# Patient Record
Sex: FEMALE | Race: BLACK OR AFRICAN AMERICAN | NOT HISPANIC OR LATINO | URBAN - METROPOLITAN AREA
[De-identification: names, ages, dates, MRNs, and addresses within clinical notes are randomized per-mention and may not be internally consistent; named-entity substitution may affect disease eponyms.]

---

## 2017-01-01 ENCOUNTER — EMERGENCY (EMERGENCY)
Facility: HOSPITAL | Age: 57
LOS: 1 days | Discharge: HOME | End: 2017-01-01
Attending: EMERGENCY MEDICINE | Admitting: EMERGENCY MEDICINE
Payer: SELF-PAY

## 2017-01-01 VITALS
HEART RATE: 92 BPM | TEMPERATURE: 98 F | DIASTOLIC BLOOD PRESSURE: 82 MMHG | OXYGEN SATURATION: 100 % | SYSTOLIC BLOOD PRESSURE: 144 MMHG | RESPIRATION RATE: 18 BRPM

## 2017-01-01 DIAGNOSIS — M25.561 PAIN IN RIGHT KNEE: ICD-10-CM

## 2017-01-01 PROCEDURE — 99283 EMERGENCY DEPT VISIT LOW MDM: CPT | Mod: 25

## 2017-01-01 PROCEDURE — 99283 EMERGENCY DEPT VISIT LOW MDM: CPT

## 2017-01-01 PROCEDURE — 99053 MED SERV 10PM-8AM 24 HR FAC: CPT

## 2017-01-01 RX ORDER — IBUPROFEN 200 MG
600 TABLET ORAL ONCE
Qty: 0 | Refills: 0 | Status: COMPLETED | OUTPATIENT
Start: 2017-01-01 | End: 2017-01-01

## 2017-01-01 RX ADMIN — Medication 600 MILLIGRAM(S): at 08:04

## 2017-01-01 NOTE — ED PROVIDER NOTE - PHYSICAL EXAMINATION
Gen: Well appearing, NAD, AOx3  Head: NCAT  HEENT: MMM, normal conjunctiva  Abd: soft, NTND, no rebound or guarding  MSK: No CVA tenderness. No edema, no visible deformities. Right knee: minimal swelling. Normal ROM. Nontender. Neg ant/post draw. Unable to reproduce symptoms with exam. No calf tenderness.   Neuro: No focal neurologic deficits. C  Skin: Warm and dry, no evidence of rash  Psych: normal mood and affect

## 2017-01-01 NOTE — ED PROCEDURE NOTE - CPROC ED POST PROC CARE GUIDE1
Verbal/written post procedure instructions were given to patient/caregiver./Keep the cast/splint/dressing clean and dry./Instructed patient/caregiver to follow-up with primary care physician.

## 2017-01-01 NOTE — ED PROVIDER NOTE - ATTENDING CONTRIBUTION TO CARE
Attending MD Cook: I personally have seen and examined this patient.  Resident note reviewed and agree on plan of care and except where noted.  See below for details.     56F with no reported PMH presents to the ED with >1 year of R knee pain.  Reports no marked change today but since over a year decided to come in.  Reports that has "clicking" and pain with walking up stairs.  Denies trauma, fall, sudden change in appearance of knee, swelling.  Reports no OTC for pain.  Denies chest pain, shortness of breath, calf pain, abdominal pain, n/v/d, recent travel, rash, urinary complaints.  On exam, NCAT, well appearing, no acute distress, AAOx3, no tenderness to palpation to knee at any aspect, FROM at bilateral knees, ambulating with steady gait, able to go from standing to sitting easily with no assistance needed to stand, no warmth, erythema, swelling to R knee, +2 DPs bilaterally, 5/5 strength bilateral lower extremities; Plan: Motrin, discharge with instructions to follow up with Orthopedist and to set up primary care as reports no PMD. Follow up appointment instructions given, importance of follow up emphasized, return to ED parameters reviewed and patient verbalized understanding.  All questions answered, all concerns addressed.

## 2017-01-01 NOTE — ED PROVIDER NOTE - MEDICAL DECISION MAKING DETAILS
56 y.o. female pw right knee discomfort since 2015. Well appearing. Normal exam. Will wrap knee, analgesia, ortho follow up.

## 2017-01-01 NOTE — ED ADULT NURSE NOTE - CHPI ED SYMPTOMS NEG
no abrasion/no numbness/no bruising/no weakness/no tingling/no tenderness/no deformity/no difficulty bearing weight/no back pain

## 2017-01-01 NOTE — ED PROVIDER NOTE - OBJECTIVE STATEMENT
56 y.o. female previously healthy pw right knee "clicking" and pain when walking up stairs since 2015. Came to ED today to get it checked out. Has not seen an orthopedist for this. Did not take anything for pain today. Denies trauma or swelling.     ROS: denies HA, weakness, dizziness, fevers/chills, nausea/vomiting, chest pain, SOB, diaphoresis, abdominal pain, back/neck pain, dysuria/hematuria, or rash

## 2017-01-01 NOTE — ED PROVIDER NOTE - MUSCULOSKELETAL MINIMAL EXAM
normal range of motion/no tenderness to palpation to knee at any aspect, FROM at R knee, ambulating with steady gait, no warmth, erythema, swelling to R knee, +2 DPs bilaterally, 5/5 strength bilateral lower extremities

## 2017-01-01 NOTE — ED ADULT NURSE NOTE - OBJECTIVE STATEMENT
57 yo female presents to ED c/o right knee pain when walking up and down stairs, pain has been consistent since 2015. 55 yo female presents to ED c/o right knee pain when walking up and down stairs, pain has been consistent since 2015, no acute change in pain. Patient denies trauma/injury to knee. Mild edema noted to right knee. No difficulty bearing weight, weakness, tenderness, decreased ROM, or paresthesias. Patient was concerned because she works at [employer] and often uses step stools.

## 2017-09-05 ENCOUNTER — EMERGENCY (EMERGENCY)
Facility: HOSPITAL | Age: 57
LOS: 1 days | Discharge: HOME | End: 2017-09-05
Attending: EMERGENCY MEDICINE | Admitting: EMERGENCY MEDICINE
Payer: SELF-PAY

## 2017-09-05 VITALS
HEART RATE: 78 BPM | OXYGEN SATURATION: 98 % | DIASTOLIC BLOOD PRESSURE: 83 MMHG | RESPIRATION RATE: 18 BRPM | SYSTOLIC BLOOD PRESSURE: 143 MMHG | TEMPERATURE: 98 F

## 2017-09-05 PROCEDURE — 99283 EMERGENCY DEPT VISIT LOW MDM: CPT

## 2017-09-05 RX ORDER — DIPHENHYDRAMINE HCL 50 MG
25 CAPSULE ORAL EVERY 4 HOURS
Qty: 0 | Refills: 0 | Status: DISCONTINUED | OUTPATIENT
Start: 2017-09-05 | End: 2017-09-09

## 2017-09-05 RX ORDER — FAMOTIDINE 10 MG/ML
20 INJECTION INTRAVENOUS ONCE
Qty: 0 | Refills: 0 | Status: COMPLETED | OUTPATIENT
Start: 2017-09-05 | End: 2017-09-05

## 2017-09-05 RX ADMIN — Medication 25 MILLIGRAM(S): at 16:54

## 2017-09-05 RX ADMIN — Medication 50 MILLIGRAM(S): at 16:54

## 2017-09-05 RX ADMIN — FAMOTIDINE 20 MILLIGRAM(S): 10 INJECTION INTRAVENOUS at 16:54

## 2017-09-05 NOTE — ED PROVIDER NOTE - PROGRESS NOTE DETAILS
patient feeling improved - Alaina Randle PA-C pt signed out to me for observation due to allergic reaction with improvement of sts and dc home.   shayan rodgers

## 2017-09-05 NOTE — ED PROVIDER NOTE - EYES, MLM
Clear bilaterally, pupils equal, round and reactive to light. mild bilateral conjunctival injection without tearing. + bilateral eyelid and infraorbital swelling

## 2017-09-05 NOTE — ED PROVIDER NOTE - ATTENDING CONTRIBUTION TO CARE
Attending MD Cook:   I personally have seen and examined this patient.  Physician assistant note reviewed and agree on plan of care and except where noted.  See below for details.     57F with no reported PMH/PSH/Meds/Allergies presents to the ED with bilateral swelling periorbitally for two days.  Reports something similar happened in 1993 and was told it was allergies.  Reports was told to follow up with allergist at that time.  Denies new environmental exposure including soap, makeup, detergents, food, new places, meds. Denies chest pain, shortness of breath, palpitations. Denies fevers, chills, dizziness, changes in vision.  Denies any other physical complaints including chest pain, shortness of breath, palpitations, abdominal pain, nausea, vomiting, diarrhea, urinary complaints.  On exam, NAD, head NCAT, PERRL, +mild periorbital edema, no erythema, EOMI, no reported pain with EOM, +mild (1+) conjunctival injection, FROM at neck, no tenderness to palpation or stepoffs along length of spine, lungs CTAB with good inspiratory effort, no stridor, no tongue swelling, no oropharyngeal erythema, no tonsillar edema,+S1S2, no m/r/g, abdomen soft with +BS, NT, ND, no CVAT, moving all extremities with 5/5 strength bilateral upper and lower extremities, good and equal  strength bilaterally, sensory grossly intact; A/P: 57F with periorbital swelling, suspect allergic reaction, will give Benadryl, Prednisone and Famotidine, reassess

## 2017-09-05 NOTE — ED PROVIDER NOTE - OBJECTIVE STATEMENT
patient with eyelid swelling bilaterally x 2 days with conjunctival injection. no new meds, foods, soaps detergents. no chest pain or sob. no rash. no fever/chills. wanted to "make sure it wasn't an infection"

## 2017-09-05 NOTE — ED PROVIDER NOTE - PLAN OF CARE
1. Stay hydrated.  Avoid allergen/potential triggers.  2. Take Benadryl 25mg every 6hrs for allergic reaction as needed- can make drowsy, don't drive after. Take Pepcid 20mg 2x/day for allergic reaction as needed. Take Prednisone 40mg daily for 4 days.  3. Follow up with your PCP or Medicine clinic [phone] in 1-2 days.  4. Return if symptoms worsen, fever, weakness, dizziness, difficulty breathing, tongue or throat swelling/discomfort and all other concerns.

## 2017-09-05 NOTE — ED PROVIDER NOTE - CARE PLAN
Principal Discharge DX:	Allergic reaction  Instructions for follow-up, activity and diet:	1. Stay hydrated.  Avoid allergen/potential triggers.  2. Take Benadryl 25mg every 6hrs for allergic reaction as needed- can make drowsy, don't drive after. Take Pepcid 20mg 2x/day for allergic reaction as needed. Take Prednisone 40mg daily for 4 days.  3. Follow up with your PCP or Medicine clinic [phone] in 1-2 days.  4. Return if symptoms worsen, fever, weakness, dizziness, difficulty breathing, tongue or throat swelling/discomfort and all other concerns. Principal Discharge DX:	Allergic reaction  Instructions for follow-up, activity and diet:	1. Stay hydrated.  Avoid allergen/potential triggers.  2. Take Benadryl 25mg every 6hrs for allergic reaction as needed- can make drowsy, don't drive after. Take Pepcid 20mg 2x/day for allergic reaction as needed. Take Prednisone 40mg daily for 4 days.  3. Follow up with your PCP or Medicine clinic [phone] in 1-2 days.  4. Return if symptoms worsen, fever, weakness, dizziness, difficulty breathing, tongue or throat swelling/discomfort and all other concerns. Principal Discharge DX:	Allergic reaction  Instructions for follow-up, activity and diet:	1. Stay hydrated.  Avoid allergen/potential triggers.  2. Take Benadryl 25mg every 6hrs for allergic reaction as needed- can make drowsy, don't drive after. Take Pepcid 20mg 2x/day for allergic reaction as needed. Take Prednisone 40mg daily for 4 days.  3. Follow up with your PCP or Medicine clinic [phone] in 1-2 days.  4. Return if symptoms worsen, fever, weakness, dizziness, difficulty breathing, tongue or throat swelling/discomfort and all other concerns.

## 2018-01-22 ENCOUNTER — EMERGENCY (EMERGENCY)
Facility: HOSPITAL | Age: 58
LOS: 1 days | End: 2018-01-22
Attending: EMERGENCY MEDICINE | Admitting: EMERGENCY MEDICINE
Payer: SELF-PAY

## 2018-01-22 VITALS
RESPIRATION RATE: 16 BRPM | WEIGHT: 199.96 LBS | HEIGHT: 66 IN | OXYGEN SATURATION: 99 % | DIASTOLIC BLOOD PRESSURE: 88 MMHG | SYSTOLIC BLOOD PRESSURE: 144 MMHG | HEART RATE: 72 BPM

## 2018-01-22 LAB
ALBUMIN SERPL ELPH-MCNC: 4 G/DL — SIGNIFICANT CHANGE UP (ref 3.3–5)
ALP SERPL-CCNC: 89 U/L — SIGNIFICANT CHANGE UP (ref 40–120)
ALT FLD-CCNC: 11 U/L RC — SIGNIFICANT CHANGE UP (ref 10–45)
ANION GAP SERPL CALC-SCNC: 9 MMOL/L — SIGNIFICANT CHANGE UP (ref 5–17)
AST SERPL-CCNC: 16 U/L — SIGNIFICANT CHANGE UP (ref 10–40)
BILIRUB SERPL-MCNC: 0.2 MG/DL — SIGNIFICANT CHANGE UP (ref 0.2–1.2)
BUN SERPL-MCNC: 13 MG/DL — SIGNIFICANT CHANGE UP (ref 7–23)
CALCIUM SERPL-MCNC: 9.5 MG/DL — SIGNIFICANT CHANGE UP (ref 8.4–10.5)
CHLORIDE SERPL-SCNC: 104 MMOL/L — SIGNIFICANT CHANGE UP (ref 96–108)
CO2 SERPL-SCNC: 30 MMOL/L — SIGNIFICANT CHANGE UP (ref 22–31)
CREAT SERPL-MCNC: 0.69 MG/DL — SIGNIFICANT CHANGE UP (ref 0.5–1.3)
GLUCOSE SERPL-MCNC: 93 MG/DL — SIGNIFICANT CHANGE UP (ref 70–99)
HCT VFR BLD CALC: 35.2 % — SIGNIFICANT CHANGE UP (ref 34.5–45)
HGB BLD-MCNC: 12 G/DL — SIGNIFICANT CHANGE UP (ref 11.5–15.5)
MCHC RBC-ENTMCNC: 28.8 PG — SIGNIFICANT CHANGE UP (ref 27–34)
MCHC RBC-ENTMCNC: 34.2 GM/DL — SIGNIFICANT CHANGE UP (ref 32–36)
MCV RBC AUTO: 84.1 FL — SIGNIFICANT CHANGE UP (ref 80–100)
PLATELET # BLD AUTO: 229 K/UL — SIGNIFICANT CHANGE UP (ref 150–400)
POTASSIUM SERPL-MCNC: 4.6 MMOL/L — SIGNIFICANT CHANGE UP (ref 3.5–5.3)
POTASSIUM SERPL-SCNC: 4.6 MMOL/L — SIGNIFICANT CHANGE UP (ref 3.5–5.3)
PROT SERPL-MCNC: 7.9 G/DL — SIGNIFICANT CHANGE UP (ref 6–8.3)
RBC # BLD: 4.18 M/UL — SIGNIFICANT CHANGE UP (ref 3.8–5.2)
RBC # FLD: 12.2 % — SIGNIFICANT CHANGE UP (ref 10.3–14.5)
SODIUM SERPL-SCNC: 143 MMOL/L — SIGNIFICANT CHANGE UP (ref 135–145)
WBC # BLD: 5.4 K/UL — SIGNIFICANT CHANGE UP (ref 3.8–10.5)
WBC # FLD AUTO: 5.4 K/UL — SIGNIFICANT CHANGE UP (ref 3.8–10.5)

## 2018-01-22 PROCEDURE — 85027 COMPLETE CBC AUTOMATED: CPT

## 2018-01-22 PROCEDURE — 93971 EXTREMITY STUDY: CPT

## 2018-01-22 PROCEDURE — 73562 X-RAY EXAM OF KNEE 3: CPT | Mod: 26,LT

## 2018-01-22 PROCEDURE — 93971 EXTREMITY STUDY: CPT | Mod: 26

## 2018-01-22 PROCEDURE — 96374 THER/PROPH/DIAG INJ IV PUSH: CPT

## 2018-01-22 PROCEDURE — 73562 X-RAY EXAM OF KNEE 3: CPT

## 2018-01-22 PROCEDURE — 99284 EMERGENCY DEPT VISIT MOD MDM: CPT

## 2018-01-22 PROCEDURE — 80053 COMPREHEN METABOLIC PANEL: CPT

## 2018-01-22 PROCEDURE — 99284 EMERGENCY DEPT VISIT MOD MDM: CPT | Mod: 25

## 2018-01-22 RX ORDER — ACETAMINOPHEN 500 MG
1000 TABLET ORAL ONCE
Qty: 0 | Refills: 0 | Status: COMPLETED | OUTPATIENT
Start: 2018-01-22 | End: 2018-01-22

## 2018-01-22 RX ORDER — SODIUM CHLORIDE 9 MG/ML
1000 INJECTION INTRAMUSCULAR; INTRAVENOUS; SUBCUTANEOUS
Qty: 0 | Refills: 0 | Status: DISCONTINUED | OUTPATIENT
Start: 2018-01-22 | End: 2018-01-26

## 2018-01-22 RX ADMIN — Medication 1000 MILLIGRAM(S): at 10:13

## 2018-01-22 RX ADMIN — Medication 400 MILLIGRAM(S): at 09:43

## 2018-01-22 RX ADMIN — SODIUM CHLORIDE 100 MILLILITER(S): 9 INJECTION INTRAMUSCULAR; INTRAVENOUS; SUBCUTANEOUS at 09:44

## 2018-01-22 NOTE — ED PROCEDURE NOTE - PROCEDURE ADDITIONAL DETAILS
LLE duplex performed to evaluate for DVT. This was a technically limited study. Patient will be sent to radiology for duplex. All anatomic landmarks were visualized but compression was limited by body habitus and musculature

## 2018-01-22 NOTE — ED ADULT NURSE NOTE - OBJECTIVE STATEMENT
Came in with c/o left leg and left leg pain. No distress. Breathing easy and non labored. ambulatory. Skin warm and dry to touch. No chills. No diaphoresis. Calm at this time but emotional support offered.

## 2018-01-22 NOTE — ED PROVIDER NOTE - SKIN, MLM
- Ondansetron and Hydroxyzine PRN Skin normal color for race, warm, dry and intact. No evidence of rash.

## 2018-01-22 NOTE — ED PROVIDER NOTE - PROGRESS NOTE DETAILS
Bellis - reassessed, ambulatory, pain improved. no emergent findings on today's workup and I reviewed all of her labs and imaging and advised on return precautions for new or worsening symptoms, outpt f/u with PMD and ortho and she is agreeable. jenni san.

## 2018-01-22 NOTE — ED PROVIDER NOTE - OBJECTIVE STATEMENT
Private Physician [facility]  57Female [facility] neg, No dm, Hypertension,hld,habits, travel,cancer, meds, allergy. Pt comes to ed complains of left leg and knee pain since 4 days. No trauma, no fever chills, Pain similar to rt knee pain of approx 1y ago. Self limited. No hx of gout, urethral dc, rash, tick bite, Treated with otc cream, and heating pad, mild improvement. Pain mild worse with climbing stairs. Pt employed Sales at [employer],

## 2018-02-05 ENCOUNTER — LABORATORY RESULT (OUTPATIENT)
Age: 58
End: 2018-02-05

## 2018-02-05 ENCOUNTER — APPOINTMENT (OUTPATIENT)
Dept: INTERNAL MEDICINE | Facility: CLINIC | Age: 58
End: 2018-02-05

## 2018-02-05 ENCOUNTER — OUTPATIENT (OUTPATIENT)
Dept: OUTPATIENT SERVICES | Facility: HOSPITAL | Age: 58
LOS: 1 days | End: 2018-02-05
Payer: SELF-PAY

## 2018-02-05 VITALS
DIASTOLIC BLOOD PRESSURE: 81 MMHG | BODY MASS INDEX: 33.49 KG/M2 | WEIGHT: 201 LBS | HEIGHT: 65 IN | SYSTOLIC BLOOD PRESSURE: 150 MMHG

## 2018-02-05 DIAGNOSIS — I10 ESSENTIAL (PRIMARY) HYPERTENSION: ICD-10-CM

## 2018-02-05 LAB
ANION GAP SERPL CALC-SCNC: 12 MMOL/L — SIGNIFICANT CHANGE UP (ref 5–17)
BUN SERPL-MCNC: 10 MG/DL — SIGNIFICANT CHANGE UP (ref 7–23)
CALCIUM SERPL-MCNC: 10 MG/DL — SIGNIFICANT CHANGE UP (ref 8.4–10.5)
CHLORIDE SERPL-SCNC: 105 MMOL/L — SIGNIFICANT CHANGE UP (ref 96–108)
CK SERPL-CCNC: 145 U/L — SIGNIFICANT CHANGE UP (ref 25–170)
CO2 SERPL-SCNC: 28 MMOL/L — SIGNIFICANT CHANGE UP (ref 22–31)
CREAT SERPL-MCNC: 0.66 MG/DL — SIGNIFICANT CHANGE UP (ref 0.5–1.3)
GLUCOSE SERPL-MCNC: 88 MG/DL — SIGNIFICANT CHANGE UP (ref 70–99)
POTASSIUM SERPL-MCNC: 4.4 MMOL/L — SIGNIFICANT CHANGE UP (ref 3.5–5.3)
POTASSIUM SERPL-SCNC: 4.4 MMOL/L — SIGNIFICANT CHANGE UP (ref 3.5–5.3)
RHEUMATOID FACT SERPL-ACNC: 9 IU/ML — SIGNIFICANT CHANGE UP (ref 0–13.9)
SODIUM SERPL-SCNC: 145 MMOL/L — SIGNIFICANT CHANGE UP (ref 135–145)

## 2018-02-05 PROCEDURE — 86200 CCP ANTIBODY: CPT

## 2018-02-05 PROCEDURE — 86225 DNA ANTIBODY NATIVE: CPT

## 2018-02-05 PROCEDURE — 86431 RHEUMATOID FACTOR QUANT: CPT

## 2018-02-05 PROCEDURE — G0463: CPT

## 2018-02-05 PROCEDURE — 80048 BASIC METABOLIC PNL TOTAL CA: CPT

## 2018-02-05 PROCEDURE — 82550 ASSAY OF CK (CPK): CPT

## 2018-02-05 PROCEDURE — 86038 ANTINUCLEAR ANTIBODIES: CPT

## 2018-02-06 LAB
ANA PAT FLD IF-IMP: ABNORMAL
ANA TITR SER: ABNORMAL
CCP IGG SERPL-ACNC: <8 UNITS — SIGNIFICANT CHANGE UP (ref 0–19)
DSDNA AB SER-ACNC: <12 IU/ML — SIGNIFICANT CHANGE UP
RF+CCP IGG SER-IMP: NEGATIVE — SIGNIFICANT CHANGE UP

## 2018-02-13 ENCOUNTER — FORM ENCOUNTER (OUTPATIENT)
Age: 58
End: 2018-02-13

## 2018-02-14 ENCOUNTER — APPOINTMENT (OUTPATIENT)
Dept: MRI IMAGING | Facility: CLINIC | Age: 58
End: 2018-02-14
Payer: SELF-PAY

## 2018-02-14 ENCOUNTER — OUTPATIENT (OUTPATIENT)
Dept: OUTPATIENT SERVICES | Facility: HOSPITAL | Age: 58
LOS: 1 days | End: 2018-02-14
Payer: SELF-PAY

## 2018-02-14 DIAGNOSIS — Z00.8 ENCOUNTER FOR OTHER GENERAL EXAMINATION: ICD-10-CM

## 2018-02-14 PROCEDURE — 73721 MRI JNT OF LWR EXTRE W/O DYE: CPT | Mod: 26,LT

## 2018-02-14 PROCEDURE — 73721 MRI JNT OF LWR EXTRE W/O DYE: CPT

## 2018-02-16 DIAGNOSIS — M25.562 PAIN IN LEFT KNEE: ICD-10-CM

## 2018-02-16 DIAGNOSIS — R03.0 ELEVATED BLOOD-PRESSURE READING, WITHOUT DIAGNOSIS OF HYPERTENSION: ICD-10-CM

## 2018-02-23 ENCOUNTER — APPOINTMENT (OUTPATIENT)
Dept: ORTHOPEDIC SURGERY | Facility: CLINIC | Age: 58
End: 2018-02-23
Payer: SELF-PAY

## 2018-02-23 VITALS — WEIGHT: 240 LBS | BODY MASS INDEX: 39.99 KG/M2 | HEIGHT: 65 IN | RESPIRATION RATE: 14 BRPM

## 2018-02-23 PROCEDURE — 73552 X-RAY EXAM OF FEMUR 2/>: CPT | Mod: LT

## 2018-02-23 PROCEDURE — 99203 OFFICE O/P NEW LOW 30 MIN: CPT

## 2018-02-23 PROCEDURE — 73564 X-RAY EXAM KNEE 4 OR MORE: CPT | Mod: LT

## 2018-02-27 ENCOUNTER — RX RENEWAL (OUTPATIENT)
Age: 58
End: 2018-02-27

## 2018-03-02 ENCOUNTER — OUTPATIENT (OUTPATIENT)
Dept: OUTPATIENT SERVICES | Facility: HOSPITAL | Age: 58
LOS: 1 days | End: 2018-03-02
Payer: SELF-PAY

## 2018-03-02 ENCOUNTER — APPOINTMENT (OUTPATIENT)
Dept: RHEUMATOLOGY | Facility: HOSPITAL | Age: 58
End: 2018-03-02

## 2018-03-02 VITALS
WEIGHT: 207 LBS | SYSTOLIC BLOOD PRESSURE: 149 MMHG | DIASTOLIC BLOOD PRESSURE: 90 MMHG | BODY MASS INDEX: 34.49 KG/M2 | HEART RATE: 70 BPM | HEIGHT: 65 IN

## 2018-03-02 DIAGNOSIS — M84.452A PATHOLOGICAL FRACTURE, LEFT FEMUR, INITIAL ENCOUNTER FOR FRACTURE: ICD-10-CM

## 2018-03-02 DIAGNOSIS — M06.9 RHEUMATOID ARTHRITIS, UNSPECIFIED: ICD-10-CM

## 2018-03-02 PROCEDURE — G0463: CPT

## 2018-03-29 ENCOUNTER — APPOINTMENT (OUTPATIENT)
Dept: ORTHOPEDIC SURGERY | Facility: CLINIC | Age: 58
End: 2018-03-29

## 2018-03-30 ENCOUNTER — APPOINTMENT (OUTPATIENT)
Dept: RADIOLOGY | Facility: IMAGING CENTER | Age: 58
End: 2018-03-30

## 2018-04-06 ENCOUNTER — APPOINTMENT (OUTPATIENT)
Dept: ORTHOPEDIC SURGERY | Facility: CLINIC | Age: 58
End: 2018-04-06
Payer: SELF-PAY

## 2018-04-06 PROCEDURE — 99214 OFFICE O/P EST MOD 30 MIN: CPT

## 2018-04-06 PROCEDURE — 73564 X-RAY EXAM KNEE 4 OR MORE: CPT | Mod: LT

## 2018-06-04 NOTE — ED PROVIDER NOTE - NS HIV RISK FACTOR YES
Writer notes response as per Dr. Cantrell. Nothing further needed at this time. Writer will close this encounter at this time.    Cruzito Robbins RN on 6/4/2018 at 8:55 AM   Declined

## 2020-07-25 ENCOUNTER — TRANSCRIPTION ENCOUNTER (OUTPATIENT)
Age: 60
End: 2020-07-25

## 2021-06-14 NOTE — ED PROVIDER NOTE - CALF TENDERNESS
See [facility] notes in chart.   [facility]  [phone]   none

## 2022-04-11 ENCOUNTER — EMERGENCY (EMERGENCY)
Facility: HOSPITAL | Age: 62
LOS: 1 days | Discharge: HOME | End: 2022-04-11
Attending: EMERGENCY MEDICINE
Payer: SELF-PAY

## 2022-04-11 VITALS
RESPIRATION RATE: 18 BRPM | SYSTOLIC BLOOD PRESSURE: 170 MMHG | OXYGEN SATURATION: 99 % | HEIGHT: 65 IN | DIASTOLIC BLOOD PRESSURE: 97 MMHG | TEMPERATURE: 98 F | HEART RATE: 94 BPM | WEIGHT: 199.96 LBS

## 2022-04-11 VITALS
RESPIRATION RATE: 19 BRPM | TEMPERATURE: 98 F | HEART RATE: 92 BPM | DIASTOLIC BLOOD PRESSURE: 97 MMHG | SYSTOLIC BLOOD PRESSURE: 176 MMHG | OXYGEN SATURATION: 100 %

## 2022-04-11 LAB
ALBUMIN SERPL ELPH-MCNC: 4 G/DL — SIGNIFICANT CHANGE UP (ref 3.3–5)
ALP SERPL-CCNC: 78 U/L — SIGNIFICANT CHANGE UP (ref 40–120)
ALT FLD-CCNC: 25 U/L — SIGNIFICANT CHANGE UP (ref 10–45)
ANION GAP SERPL CALC-SCNC: 12 MMOL/L — SIGNIFICANT CHANGE UP (ref 5–17)
AST SERPL-CCNC: 22 U/L — SIGNIFICANT CHANGE UP (ref 10–40)
BASOPHILS # BLD AUTO: 0.02 K/UL — SIGNIFICANT CHANGE UP (ref 0–0.2)
BASOPHILS NFR BLD AUTO: 0.3 % — SIGNIFICANT CHANGE UP (ref 0–2)
BILIRUB SERPL-MCNC: 0.4 MG/DL — SIGNIFICANT CHANGE UP (ref 0.2–1.2)
BUN SERPL-MCNC: 12 MG/DL — SIGNIFICANT CHANGE UP (ref 7–23)
CALCIUM SERPL-MCNC: 10 MG/DL — SIGNIFICANT CHANGE UP (ref 8.4–10.5)
CHLORIDE SERPL-SCNC: 105 MMOL/L — SIGNIFICANT CHANGE UP (ref 96–108)
CO2 SERPL-SCNC: 25 MMOL/L — SIGNIFICANT CHANGE UP (ref 22–31)
CREAT SERPL-MCNC: 0.65 MG/DL — SIGNIFICANT CHANGE UP (ref 0.5–1.3)
EGFR: 100 ML/MIN/1.73M2 — SIGNIFICANT CHANGE UP
EOSINOPHIL # BLD AUTO: 0.1 K/UL — SIGNIFICANT CHANGE UP (ref 0–0.5)
EOSINOPHIL NFR BLD AUTO: 1.7 % — SIGNIFICANT CHANGE UP (ref 0–6)
GLUCOSE SERPL-MCNC: 92 MG/DL — SIGNIFICANT CHANGE UP (ref 70–99)
HCT VFR BLD CALC: 37.4 % — SIGNIFICANT CHANGE UP (ref 34.5–45)
HGB BLD-MCNC: 12.2 G/DL — SIGNIFICANT CHANGE UP (ref 11.5–15.5)
IMM GRANULOCYTES NFR BLD AUTO: 0.3 % — SIGNIFICANT CHANGE UP (ref 0–1.5)
LYMPHOCYTES # BLD AUTO: 1.92 K/UL — SIGNIFICANT CHANGE UP (ref 1–3.3)
LYMPHOCYTES # BLD AUTO: 32.1 % — SIGNIFICANT CHANGE UP (ref 13–44)
MCHC RBC-ENTMCNC: 27.1 PG — SIGNIFICANT CHANGE UP (ref 27–34)
MCHC RBC-ENTMCNC: 32.6 GM/DL — SIGNIFICANT CHANGE UP (ref 32–36)
MCV RBC AUTO: 83.1 FL — SIGNIFICANT CHANGE UP (ref 80–100)
MONOCYTES # BLD AUTO: 0.36 K/UL — SIGNIFICANT CHANGE UP (ref 0–0.9)
MONOCYTES NFR BLD AUTO: 6 % — SIGNIFICANT CHANGE UP (ref 2–14)
NEUTROPHILS # BLD AUTO: 3.56 K/UL — SIGNIFICANT CHANGE UP (ref 1.8–7.4)
NEUTROPHILS NFR BLD AUTO: 59.6 % — SIGNIFICANT CHANGE UP (ref 43–77)
NRBC # BLD: 0 /100 WBCS — SIGNIFICANT CHANGE UP (ref 0–0)
PLATELET # BLD AUTO: 226 K/UL — SIGNIFICANT CHANGE UP (ref 150–400)
POTASSIUM SERPL-MCNC: 4.4 MMOL/L — SIGNIFICANT CHANGE UP (ref 3.5–5.3)
POTASSIUM SERPL-SCNC: 4.4 MMOL/L — SIGNIFICANT CHANGE UP (ref 3.5–5.3)
PROT SERPL-MCNC: 7 G/DL — SIGNIFICANT CHANGE UP (ref 6–8.3)
RAPID RVP RESULT: SIGNIFICANT CHANGE UP
RBC # BLD: 4.5 M/UL — SIGNIFICANT CHANGE UP (ref 3.8–5.2)
RBC # FLD: 14.1 % — SIGNIFICANT CHANGE UP (ref 10.3–14.5)
SARS-COV-2 RNA SPEC QL NAA+PROBE: SIGNIFICANT CHANGE UP
SODIUM SERPL-SCNC: 142 MMOL/L — SIGNIFICANT CHANGE UP (ref 135–145)
TROPONIN T, HIGH SENSITIVITY RESULT: 10 NG/L — SIGNIFICANT CHANGE UP (ref 0–51)
WBC # BLD: 5.98 K/UL — SIGNIFICANT CHANGE UP (ref 3.8–10.5)
WBC # FLD AUTO: 5.98 K/UL — SIGNIFICANT CHANGE UP (ref 3.8–10.5)

## 2022-04-11 PROCEDURE — 93005 ELECTROCARDIOGRAM TRACING: CPT

## 2022-04-11 PROCEDURE — 93010 ELECTROCARDIOGRAM REPORT: CPT

## 2022-04-11 PROCEDURE — 71046 X-RAY EXAM CHEST 2 VIEWS: CPT

## 2022-04-11 PROCEDURE — 0225U NFCT DS DNA&RNA 21 SARSCOV2: CPT

## 2022-04-11 PROCEDURE — 71046 X-RAY EXAM CHEST 2 VIEWS: CPT | Mod: 26

## 2022-04-11 PROCEDURE — 99285 EMERGENCY DEPT VISIT HI MDM: CPT | Mod: 25

## 2022-04-11 PROCEDURE — 80053 COMPREHEN METABOLIC PANEL: CPT

## 2022-04-11 PROCEDURE — 85025 COMPLETE CBC W/AUTO DIFF WBC: CPT

## 2022-04-11 PROCEDURE — 84484 ASSAY OF TROPONIN QUANT: CPT

## 2022-04-11 PROCEDURE — 36415 COLL VENOUS BLD VENIPUNCTURE: CPT

## 2022-04-11 NOTE — ED PROVIDER NOTE - PROGRESS NOTE DETAILS
Catie Rasmussen PGY-2 patient well appearing, stable for discharge, vitals reviewed. given strict return precautions for worsening cough, sob to come back to ed. pt understands plan and will follow up with PMD within 1 week.

## 2022-04-11 NOTE — ED PROVIDER NOTE - RAPID ASSESSMENT
61y F with no reported PMHx presents to the ED with congestion, cough and SOB since yesterday. Patient is breathing comfortably and answering questions appropriately, awake and alert.    Scribe Statement: ISurinder Grace, attest that this documentation has been prepared under the direction and in the presence of Gladis Glass) 61y F with no reported PMHx presents to the ED with congestion, cough and SOB since yesterday. RA sat of 100 percent Patient is breathing comfortably and answering questions appropriately, awake and alert.    Scribe Statement: I, Malika Cadena, attest that this documentation has been prepared under the direction and in the presence of Gladis Glass)    ap- This scribe's documentation has been prepared under my direction and personally reviewed by me. Pt to be sent to main ED for further evaluation - all orders placed to be followed by attending physician in main ED.

## 2022-04-11 NOTE — ED PROVIDER NOTE - CLINICAL SUMMARY MEDICAL DECISION MAKING FREE TEXT BOX
61y F with no reported PMHx presents to the ED with congestion, cough and SOB since yesterday. vss, on exam abd ntnd, lungs cta. likely viral URI, low suspicion for pneumonia, new chf, pleural effusions. pending labs and imaging. likely dispo home

## 2022-04-11 NOTE — ED PROVIDER NOTE - NSFOLLOWUPINSTRUCTIONS_ED_ALL_ED_FT
Upper Respiratory Infection, Adult      An upper respiratory infection (URI) is a common viral infection of the nose, throat, and upper air passages that lead to the lungs. The most common type of URI is the common cold. URIs usually get better on their own, without medical treatment.      What are the causes?    A URI is caused by a virus. You may catch a virus by:  •Breathing in droplets from an infected person's cough or sneeze.      •Touching something that has been exposed to the virus (contaminated) and then touching your mouth, nose, or eyes.        What increases the risk?    You are more likely to get a URI if:  •You are very young or very old.      •It is jeff or winter.      •You have close contact with others, such as at a , school, or health care facility.      •You smoke.      •You have long-term (chronic) heart or lung disease.      •You have a weakened disease-fighting (immune) system.      •You have nasal allergies or asthma.      •You are experiencing a lot of stress.      •You work in an area that has poor air circulation.      •You have poor nutrition.        What are the signs or symptoms?    A URI usually involves some of the following symptoms:  •Runny or stuffy (congested) nose.      •Sneezing.      •Cough.      •Sore throat.      •Headache.      •Fatigue.      •Fever.      •Loss of appetite.      •Pain in your forehead, behind your eyes, and over your cheekbones (sinus pain).      •Muscle aches.      •Redness or irritation of the eyes.      •Pressure in the ears or face.        How is this diagnosed?    This condition may be diagnosed based on your medical history and symptoms, and a physical exam. Your health care provider may use a cotton swab to take a mucus sample from your nose (nasal swab). This sample can be tested to determine what virus is causing the illness.      How is this treated?    URIs usually get better on their own within 7–10 days. You can take steps at home to relieve your symptoms. Medicines cannot cure URIs, but your health care provider may recommend certain medicines to help relieve symptoms, such as:  •Over-the-counter cold medicines.      •Cough suppressants. Coughing is a type of defense against infection that helps to clear the respiratory system, so take these medicines only as recommended by your health care provider.      •Fever-reducing medicines.        Follow these instructions at home:    Activity     •Rest as needed.      •If you have a fever, stay home from work or school until your fever is gone or until your health care provider says you are no longer contagious. Your health care provider may have you wear a face mask to prevent your infection from spreading.      Relieving symptoms     •Gargle with a salt-water mixture 3–4 times a day or as needed. To make a salt-water mixture, completely dissolve ½–1 tsp of salt in 1 cup of warm water.      •Use a cool-mist humidifier to add moisture to the air. This can help you breathe more easily.        Eating and drinking      •Drink enough fluid to keep your urine pale yellow.      •Eat soups and other clear broths.        General instructions      •Take over-the-counter and prescription medicines only as told by your health care provider. These include cold medicines, fever reducers, and cough suppressants.      • Do not use any products that contain nicotine or tobacco, such as cigarettes and e-cigarettes. If you need help quitting, ask your health care provider.       •Stay away from secondhand smoke.      •Stay up to date on all immunizations, including the yearly (annual) flu vaccine.      •Keep all follow-up visits as told by your health care provider. This is important.        How to prevent the spread of infection to others    •URIs can be passed from person to person (are contagious). To prevent the infection from spreading:  •Wash your hands often with soap and water. If soap and water are not available, use hand .      •Avoid touching your mouth, face, eyes, or nose.      •Cough or sneeze into a tissue or your sleeve or elbow instead of into your hand or into the air.          Contact a health care provider if:    •You are getting worse instead of better.      •You have a fever or chills.      •Your mucus is brown or red.      •You have yellow or brown discharge coming from your nose.      •You have pain in your face, especially when you bend forward.      •You have swollen neck glands.      •You have pain while swallowing.      •You have white areas in the back of your throat.        Get help right away if:    •You have shortness of breath that gets worse.    •You have severe or persistent:  •Headache.      •Ear pain.      •Sinus pain.      •Chest pain.      •You have chronic lung disease along with any of the following:  •Wheezing.      •Prolonged cough.      •Coughing up blood.      •A change in your usual mucus.        •You have a stiff neck.    •You have changes in your:  •Vision.      •Hearing.      •Thinking.      •Mood.          Summary    •An upper respiratory infection (URI) is a common infection of the nose, throat, and upper air passages that lead to the lungs.      •A URI is caused by a virus.      •URIs usually get better on their own within 7–10 days.      •Medicines cannot cure URIs, but your health care provider may recommend certain medicines to help relieve symptoms.      This information is not intended to replace advice given to you by your health care provider. Make sure you discuss any questions you have with your health care provider.

## 2022-04-11 NOTE — ED PROVIDER NOTE - OBJECTIVE STATEMENT
61y F with no reported PMHx presents to the ED with congestion, cough and SOB since yesterday. pt states she felt symptoms last night. called her daughter who recommended to go to the ED given congestion. Patient did not take any meds at home for her sxs. no ill contacts  vaccinated x4 for covid  pt denies any fever, chills, cp, palpitations, abdominal pain, v/d, dysuria, numbness

## 2022-04-11 NOTE — ED PROVIDER NOTE - NSFOLLOWUPCLINICS_GEN_ALL_ED_FT
[facility]  Family Medicine  [address] [place] [address]  Phone: [phone]  Fax:     [facility]  Family Medicine  [address] [place] [address]  Phone: [phone]  Fax:

## 2022-04-11 NOTE — ED PROVIDER NOTE - NSICDXPASTMEDICALHX_GEN_ALL_CORE_FT
PAST MEDICAL HISTORY:  COVID-19 vaccine series completed W #2 boosters    No pertinent past medical history

## 2022-04-11 NOTE — ED PROVIDER NOTE - PATIENT PORTAL LINK FT
You can access the FollowMyHealth Patient Portal offered by [facility] by registering at the following website: [email or web address]. By joining [facility]’s FollowMyHealth portal, you will also be able to view your health information using other applications (apps) compatible with our system.

## 2022-04-11 NOTE — ED PROVIDER NOTE - NS ED ROS FT
Constitutional: No fever, chills.  Eyes:  No visual changes  ENMT:  No neck pain  Cardiac:  No chest pain  Respiratory:  +cough, SOB  GI:  No nausea, vomiting, diarrhea, abdominal pain.  :  No dysuria, hematuria  MS:  No back pain.  Neuro:  No headache or lightheadedness  Skin:  No skin rash

## 2022-04-11 NOTE — ED PROVIDER NOTE - ATTENDING CONTRIBUTION TO CARE
Private Physician None  61y female pmh neg. No dm,htn,hld,cancer,cva,cad,habits,travel,covid. Pt c/o feeling shortness of breath congested at home 1pm today now feels fine. NO cp, fever and chills, abd pain nvdc, Pt spoke to family and referred to ed for eval. PE WDWN female awake alert normocephalic atraumatic neck supple chest clear anterior & posterior cv no rubs, gallops or murmurs abd soft +bs neuro no focal defects  msk no pedal edema  Hood Toledo MD, Facep

## 2022-04-12 ENCOUNTER — RESULT CHARGE (OUTPATIENT)
Age: 62
End: 2022-04-12

## 2022-04-13 ENCOUNTER — MED ADMIN CHARGE (OUTPATIENT)
Age: 62
End: 2022-04-13

## 2022-04-13 ENCOUNTER — OUTPATIENT (OUTPATIENT)
Dept: OUTPATIENT SERVICES | Facility: HOSPITAL | Age: 62
LOS: 1 days | End: 2022-04-13
Payer: SELF-PAY

## 2022-04-13 ENCOUNTER — INPATIENT (INPATIENT)
Facility: HOSPITAL | Age: 62
LOS: 2 days | Discharge: HOME | DRG: 309 | End: 2022-04-16
Attending: FAMILY MEDICINE | Admitting: STUDENT IN AN ORGANIZED HEALTH CARE EDUCATION/TRAINING PROGRAM
Payer: SELF-PAY

## 2022-04-13 ENCOUNTER — TRANSCRIPTION ENCOUNTER (OUTPATIENT)
Age: 62
End: 2022-04-13

## 2022-04-13 ENCOUNTER — APPOINTMENT (OUTPATIENT)
Dept: FAMILY MEDICINE | Facility: HOSPITAL | Age: 62
End: 2022-04-13
Payer: COMMERCIAL

## 2022-04-13 VITALS
HEIGHT: 65 IN | DIASTOLIC BLOOD PRESSURE: 113 MMHG | HEART RATE: 83 BPM | WEIGHT: 162.92 LBS | RESPIRATION RATE: 18 BRPM | SYSTOLIC BLOOD PRESSURE: 173 MMHG | TEMPERATURE: 98 F | OXYGEN SATURATION: 97 %

## 2022-04-13 VITALS
WEIGHT: 163 LBS | RESPIRATION RATE: 16 BRPM | HEART RATE: 96 BPM | SYSTOLIC BLOOD PRESSURE: 156 MMHG | OXYGEN SATURATION: 99 % | DIASTOLIC BLOOD PRESSURE: 106 MMHG | BODY MASS INDEX: 27.12 KG/M2 | TEMPERATURE: 96.9 F

## 2022-04-13 DIAGNOSIS — I48.91 UNSPECIFIED ATRIAL FIBRILLATION: ICD-10-CM

## 2022-04-13 DIAGNOSIS — Z00.00 ENCOUNTER FOR GENERAL ADULT MEDICAL EXAMINATION WITHOUT ABNORMAL FINDINGS: ICD-10-CM

## 2022-04-13 DIAGNOSIS — R09.89 OTHER SPECIFIED SYMPTOMS AND SIGNS INVOLVING THE CIRCULATORY AND RESPIRATORY SYSTEMS: ICD-10-CM

## 2022-04-13 PROBLEM — Z92.29 PERSONAL HISTORY OF OTHER DRUG THERAPY: Chronic | Status: ACTIVE | Noted: 2022-04-11

## 2022-04-13 LAB
ALBUMIN SERPL ELPH-MCNC: 3.5 G/DL — SIGNIFICANT CHANGE UP (ref 3.3–5)
ALP SERPL-CCNC: 87 U/L — SIGNIFICANT CHANGE UP (ref 40–120)
ALT FLD-CCNC: 44 U/L — SIGNIFICANT CHANGE UP (ref 10–45)
ANION GAP SERPL CALC-SCNC: 10 MMOL/L — SIGNIFICANT CHANGE UP (ref 5–17)
AST SERPL-CCNC: 60 U/L — HIGH (ref 10–40)
BASOPHILS # BLD AUTO: 0.02 K/UL — SIGNIFICANT CHANGE UP (ref 0–0.2)
BASOPHILS NFR BLD AUTO: 0.3 % — SIGNIFICANT CHANGE UP (ref 0–2)
BILIRUB SERPL-MCNC: 0.6 MG/DL — SIGNIFICANT CHANGE UP (ref 0.2–1.2)
BUN SERPL-MCNC: 14 MG/DL — SIGNIFICANT CHANGE UP (ref 7–23)
CALCIUM SERPL-MCNC: 9 MG/DL — SIGNIFICANT CHANGE UP (ref 8.4–10.5)
CHLORIDE SERPL-SCNC: 108 MMOL/L — SIGNIFICANT CHANGE UP (ref 96–108)
CO2 SERPL-SCNC: 26 MMOL/L — SIGNIFICANT CHANGE UP (ref 22–31)
CREAT SERPL-MCNC: 0.61 MG/DL — SIGNIFICANT CHANGE UP (ref 0.5–1.3)
D DIMER BLD IA.RAPID-MCNC: 422 NG/ML DDU — HIGH
EGFR: 102 ML/MIN/1.73M2 — SIGNIFICANT CHANGE UP
EOSINOPHIL # BLD AUTO: 0.2 K/UL — SIGNIFICANT CHANGE UP (ref 0–0.5)
EOSINOPHIL NFR BLD AUTO: 3.1 % — SIGNIFICANT CHANGE UP (ref 0–6)
GLUCOSE SERPL-MCNC: 86 MG/DL — SIGNIFICANT CHANGE UP (ref 70–99)
HCT VFR BLD CALC: 37 % — SIGNIFICANT CHANGE UP (ref 34.5–45)
HGB BLD-MCNC: 12.3 G/DL — SIGNIFICANT CHANGE UP (ref 11.5–15.5)
IMM GRANULOCYTES NFR BLD AUTO: 0.3 % — SIGNIFICANT CHANGE UP (ref 0–1.5)
LYMPHOCYTES # BLD AUTO: 1.73 K/UL — SIGNIFICANT CHANGE UP (ref 1–3.3)
LYMPHOCYTES # BLD AUTO: 26.7 % — SIGNIFICANT CHANGE UP (ref 13–44)
MAGNESIUM SERPL-MCNC: 1.8 MG/DL — SIGNIFICANT CHANGE UP (ref 1.6–2.6)
MCHC RBC-ENTMCNC: 27.5 PG — SIGNIFICANT CHANGE UP (ref 27–34)
MCHC RBC-ENTMCNC: 33.2 GM/DL — SIGNIFICANT CHANGE UP (ref 32–36)
MCV RBC AUTO: 82.8 FL — SIGNIFICANT CHANGE UP (ref 80–100)
MONOCYTES # BLD AUTO: 0.49 K/UL — SIGNIFICANT CHANGE UP (ref 0–0.9)
MONOCYTES NFR BLD AUTO: 7.6 % — SIGNIFICANT CHANGE UP (ref 2–14)
NEUTROPHILS # BLD AUTO: 4.02 K/UL — SIGNIFICANT CHANGE UP (ref 1.8–7.4)
NEUTROPHILS NFR BLD AUTO: 62 % — SIGNIFICANT CHANGE UP (ref 43–77)
NRBC # BLD: 0 /100 WBCS — SIGNIFICANT CHANGE UP (ref 0–0)
PHOSPHATE SERPL-MCNC: 4.1 MG/DL — SIGNIFICANT CHANGE UP (ref 2.5–4.5)
PLATELET # BLD AUTO: 199 K/UL — SIGNIFICANT CHANGE UP (ref 150–400)
POTASSIUM SERPL-MCNC: 4.2 MMOL/L — SIGNIFICANT CHANGE UP (ref 3.5–5.3)
POTASSIUM SERPL-SCNC: 4.2 MMOL/L — SIGNIFICANT CHANGE UP (ref 3.5–5.3)
PROT SERPL-MCNC: 7.1 G/DL — SIGNIFICANT CHANGE UP (ref 6–8.3)
RBC # BLD: 4.47 M/UL — SIGNIFICANT CHANGE UP (ref 3.8–5.2)
RBC # FLD: 14.2 % — SIGNIFICANT CHANGE UP (ref 10.3–14.5)
SARS-COV-2 RNA SPEC QL NAA+PROBE: SIGNIFICANT CHANGE UP
SODIUM SERPL-SCNC: 144 MMOL/L — SIGNIFICANT CHANGE UP (ref 135–145)
TROPONIN I, HIGH SENSITIVITY RESULT: 77.2 NG/L — HIGH
TSH SERPL-MCNC: 0.48 UIU/ML — SIGNIFICANT CHANGE UP (ref 0.36–3.74)
WBC # BLD: 6.48 K/UL — SIGNIFICANT CHANGE UP (ref 3.8–10.5)
WBC # FLD AUTO: 6.48 K/UL — SIGNIFICANT CHANGE UP (ref 3.8–10.5)

## 2022-04-13 PROCEDURE — 93970 EXTREMITY STUDY: CPT | Mod: 26

## 2022-04-13 PROCEDURE — 93005 ELECTROCARDIOGRAM TRACING: CPT

## 2022-04-13 PROCEDURE — G0463: CPT

## 2022-04-13 PROCEDURE — 93010 ELECTROCARDIOGRAM REPORT: CPT

## 2022-04-13 PROCEDURE — 99223 1ST HOSP IP/OBS HIGH 75: CPT | Mod: GC

## 2022-04-13 PROCEDURE — 90471 IMMUNIZATION ADMIN: CPT

## 2022-04-13 PROCEDURE — 99285 EMERGENCY DEPT VISIT HI MDM: CPT

## 2022-04-13 PROCEDURE — 93010 ELECTROCARDIOGRAM REPORT: CPT | Mod: 76

## 2022-04-13 RX ORDER — IBUPROFEN 600 MG/1
600 TABLET, FILM COATED ORAL 3 TIMES DAILY
Qty: 90 | Refills: 1 | Status: DISCONTINUED | COMMUNITY
Start: 2018-02-27 | End: 2022-04-13

## 2022-04-13 RX ORDER — METOPROLOL TARTRATE 50 MG
25 TABLET ORAL
Refills: 0 | Status: DISCONTINUED | OUTPATIENT
Start: 2022-04-13 | End: 2022-04-14

## 2022-04-13 RX ORDER — ENOXAPARIN SODIUM 100 MG/ML
75 INJECTION SUBCUTANEOUS EVERY 12 HOURS
Refills: 0 | Status: DISCONTINUED | OUTPATIENT
Start: 2022-04-13 | End: 2022-04-14

## 2022-04-13 RX ORDER — ASPIRIN/CALCIUM CARB/MAGNESIUM 324 MG
324 TABLET ORAL ONCE
Refills: 0 | Status: COMPLETED | OUTPATIENT
Start: 2022-04-13 | End: 2022-04-13

## 2022-04-13 RX ORDER — MELOXICAM 7.5 MG/1
7.5 TABLET ORAL TWICE DAILY
Qty: 60 | Refills: 0 | Status: DISCONTINUED | COMMUNITY
Start: 2018-03-02 | End: 2022-04-13

## 2022-04-13 RX ORDER — LANOLIN ALCOHOL/MO/W.PET/CERES
3 CREAM (GRAM) TOPICAL AT BEDTIME
Refills: 0 | Status: DISCONTINUED | OUTPATIENT
Start: 2022-04-13 | End: 2022-04-16

## 2022-04-13 RX ORDER — ACETAMINOPHEN 500 MG
650 TABLET ORAL EVERY 6 HOURS
Refills: 0 | Status: DISCONTINUED | OUTPATIENT
Start: 2022-04-13 | End: 2022-04-16

## 2022-04-13 RX ORDER — ONDANSETRON 8 MG/1
4 TABLET, FILM COATED ORAL EVERY 8 HOURS
Refills: 0 | Status: DISCONTINUED | OUTPATIENT
Start: 2022-04-13 | End: 2022-04-16

## 2022-04-13 RX ORDER — ACETAMINOPHEN 325 MG/1
325 TABLET, FILM COATED ORAL
Qty: 30 | Refills: 3 | Status: DISCONTINUED | COMMUNITY
Start: 2018-03-02 | End: 2022-04-13

## 2022-04-13 RX ADMIN — Medication 324 MILLIGRAM(S): at 23:39

## 2022-04-13 RX ADMIN — Medication 25 MILLIGRAM(S): at 23:28

## 2022-04-13 RX ADMIN — ENOXAPARIN SODIUM 75 MILLIGRAM(S): 100 INJECTION SUBCUTANEOUS at 23:27

## 2022-04-13 NOTE — PHYSICAL EXAM
[Tachycardia] : tachycardic [Irregularly Irregular] : irregularly irregular [Normal] : soft, non-tender, non-distended, no masses palpated, no HSM and normal bowel sounds [Normal Affect] : the affect was normal [Normal Insight/Judgement] : insight and judgment were intact

## 2022-04-13 NOTE — ED ADULT NURSE NOTE - OBJECTIVE STATEMENT
61 yr old female for c/o hypertension. Pt A&Ox4 from home states "I went to [facility] today because my blood pressure was high". Patient has recently been complaining of rhinorrhea, was seen at [place] ED yesterday for same. Pt denies shortness of breath, chest pain, nausea, vomiting, tingling, dizziness, numbness.

## 2022-04-13 NOTE — ED ADULT TRIAGE NOTE - CHIEF COMPLAINT QUOTE
Patient brought to ED by Family Practice RN for hypertension. Patient has recently been complaining of rhinorrhea, was seen at [place] ED yesterday when they noted the high BP.

## 2022-04-13 NOTE — H&P ADULT - NSICDXPASTMEDICALHX_GEN_ALL_CORE_FT
PAST MEDICAL HISTORY:  Asthma     COVID-19 vaccine series completed W #2 boosters    HTN (hypertension)

## 2022-04-13 NOTE — H&P ADULT - ATTENDING COMMENTS
CISNEROS   New onset A fib  Unilateral right LE edema  - start therapeutic lovenox for AC. CHADSvacs 2  - cardiology consult  - check TSH and echo  - metoprolol 25mg BID  - check D-dimer. if venous doppler negative, could obtain CTA chest to r/o PE      HTN  - metoprolol   - titrate as needed

## 2022-04-13 NOTE — H&P ADULT - NSHPREVIEWOFSYSTEMS_GEN_ALL_CORE
REVIEW OF SYSTEMS:  CONSTITUTIONAL: (-) weakness, (-) fevers, (-) chills  EYES/ENT: (-) visual changes,  (-) vertigo,  (-) throat pain   NECK:  (-) pain, (-) stiffness  RESPIRATORY:  (+) shortness of breath, (-) cough,  (-) wheezing,  (-) hemoptysis   CARDIOVASCULAR:  (-) chest pain, (-) palpitations  GASTROINTESTINAL:  (-) abdominal or epigastric pain, (-) nausea, (-) vomiting, (-) diarrhea, (-) constipation, (-) melena,  (-) hematemesis,  (-) hematochezia  GENITOURINARY: (-) dysuria, (-) frequency, (-) hematuria  NEUROLOGICAL: (-) numbness, (-) weakness  SKIN: (-) itching, (-) rashes, (-) lesions

## 2022-04-13 NOTE — ED ADULT NURSE NOTE - CHIEF COMPLAINT QUOTE
Patient brought to ED by Family Practice RN for hypertension. Patient has recently been complaining of rhinorrhea, was seen at [place] ED yesterday when they noted the high BP.

## 2022-04-13 NOTE — H&P ADULT - NSICDXFAMILYHX_GEN_ALL_CORE_FT
FAMILY HISTORY:  Father  Still living? Unknown  FH: gastric cancer, Age at diagnosis: Age Unknown    Mother  Still living? Unknown  FH: aneurysm, Age at diagnosis: Age Unknown  FH: HTN (hypertension), Age at diagnosis: Age Unknown    Sibling  Still living? Unknown  FH: aneurysm, Age at diagnosis: Age Unknown

## 2022-04-13 NOTE — ED ADULT NURSE NOTE - NS ED NURSE DISCH DISPOSITION
11/5 Transition of Care: Patient currently sleeping. He is a post prostatectomy of 10/26/20. His family continued his Eliquis post procedure and patient with increased bleeding and clots. Champion three way placed and patient being irrigated. Eliquis on hold. Plan is to monitor patient and irrigate till clear. /hr. Iv rocephin qd. Will follow patient for further plan of care when he wakes up.  Bibi Betts RN    [phone] Admitted

## 2022-04-13 NOTE — HISTORY OF PRESENT ILLNESS
[FreeTextEntry1] : ED discharge [de-identified] : 62 y/o female w/ PMH of asthma, medial femoral condyle fracture (now healed) presents to clinic after being discharged from ED yesterday due to congestion, SOB. Diagnosed with URI. Patient found to be in AFIB at that time but no intervention was done. Today patient states that she feels okay, denies any issues. On PE patient noted to have an irregular rate and tachycardia. EKG was done which showed afib/ rate controlled. Patient denies current CP, palpitations, headache, CISNEROS, visual changes.

## 2022-04-13 NOTE — ASSESSMENT
[FreeTextEntry1] : # New onset Afib\par - pt not on any anticoagulant\par - picked up on EKG two times in the past 48 hours\par - Will sent to ED for possible admission for further work up/anticoagulation\par \par #HTN\par - initial / >100, repeat 156/106\par - Will start HCTZ 12.5mg \par - can be adjusted accordingly in ED/Admission\par \par #Asthma\par - seems to be stable\par - will prescribe albuterol as needed\par \par #Encounter for Immunization\par - flu shot given\par \par #preventive measure\par - lipid profile, A1C and TSh ordered\par - Weight: goal BMI <25. \par - Physical Activity: Advised pt 150 min/wk aerobic activity recommended\par - Medical Nutritional Therapy: Mediterranean diet recommended. \par \par D/w Dr. Michael\par

## 2022-04-13 NOTE — H&P ADULT - HISTORY OF PRESENT ILLNESS
62 y/o F with PMHx of HTN, Asthma presents to ED from Family Practice for abnormal EKG findings. Patient was evaluated in ED 2 days ago for sob. Patient was diagnosed with Viral URI and was sent home and advised  60 y/o F with PMHx of HTN, Asthma presents to ED from [facility] for abnormal EKG findings. Patient was evaluated in ED 2 days ago for sob. In ED, EKG showed atrial fibrillation, patient was diagnosed with Viral URI and was sent home and advised to f/u at  today. Today pt was evaluated at  and repeat EKG showed atrial fibrillation. Patient was sent to ED for further management. Patient currently reports of Aguilar and some "mucus", states started few days ago. Patient denies fever, chills, headache, dizziness, change in vision, blurry vision, chest pain, palpitations, , abdominal pain, nausea, vomiting, diarrhea, constipation, hematochezia, melena, change in bowel movement, weight loss, dysuria, urinary frequency, urgency, hematuria, numbness, weakness or tingling. No recent travel, no sick contacts. No Hx of CAD, FHx of CAD. Denies Hx of DVT/PE.  62 y/o F with PMHx of HTN, Asthma presents to ED from [facility] for abnormal EKG findings. Patient was evaluated in ED 2 days ago for sob. In ED, EKG showed atrial fibrillation, patient was diagnosed with Viral URI and was sent home and advised to f/u at  today. Today pt was evaluated at  and repeat EKG showed atrial fibrillation. Patient was sent to ED for further management. Patient currently reports of Aguilar and some "mucus", states started few days ago. Patient denies fever, chills, headache, dizziness, change in vision, blurry vision, chest pain, palpitations, , abdominal pain, nausea, vomiting, diarrhea, constipation, hematochezia, melena, change in bowel movement, weight loss, dysuria, urinary frequency, urgency, hematuria, numbness, weakness or tingling. No recent travel, no sick contacts. No Hx of CAD, FHx of CAD. Denies Hx of DVT/PE.    62 y/o F with PMHx of HTN, Asthma, Hx of positive CAROLE (per chart review) presents to ED from [facility] for abnormal EKG findings. Patient was evaluated in ED 2 days ago for sob. In ED, EKG showed atrial fibrillation, patient was diagnosed with Viral URI and was sent home and advised to f/u at  today. Today pt was evaluated at  and repeat EKG showed atrial fibrillation. Patient was sent to ED for further management. Patient currently reports of Aguilar and some "mucus", states started few days ago. Patient denies fever, chills, headache, dizziness, change in vision, blurry vision, chest pain, palpitations, , abdominal pain, nausea, vomiting, diarrhea, constipation, hematochezia, melena, change in bowel movement, weight loss, dysuria, urinary frequency, urgency, hematuria, numbness, weakness or tingling. No recent travel, no sick contacts. No Hx of CAD, FHx of CAD. Denies Hx of DVT/PE.

## 2022-04-13 NOTE — H&P ADULT - ASSESSMENT
62 y/o F with PMHx of HTN, Asthma presents to ED from Family Practice after found to be in new onset Afib     ADMIT: TELE       #New Onset Afib   -EKG   -Troponin x1: neg  -Dimer   -f/u on TSH   -[id number] Score: 2 (Female, HTN)   -Start Metoprolol Tartrate 25mg PO BID, titrate PRN   -Start Lovenox   -ECHO in AM   -Cardiology consult     #RLE Pedal Edema   -f/u on duplex US     #HTN  -pt was diagnosed with HTN today and was started on HCTZ      #Transaminitis   -f/u on acute hepatitis panel  -if persistently elevated can get US abdomen as outpt     #DVT Prophylaxis   -on Lovenox     #DIET  -DASH/TLC     Case and plan discussed with Dr. Tolu Rouse  60 y/o F with PMHx of HTN, Asthma presents to ED from Family Practice after found to be in new onset Afib     ADMIT: TELE       #New Onset Afib   -EKG: (+)irregularly irregular, no ST segment changes, no TWI   -Troponin x1: mildly elevated, repeat troponins x2 q3  -f/u on D-dimer   -f/u on TSH   -[id number] Score: 2 (Female, HTN)   -Start Metoprolol Tartrate 25mg PO BID, titrate PRN   -Start Lovenox 1mg/kg BID   -ECHO in AM   -Cardiology consult     #RLE Pedal Edema   -f/u on duplex US     #HTN  -pt was diagnosed with HTN today and was started on HCTZ  -HOLD HCTZ  -Start Metoprolol Tartrate 25mg BID, titrate PRN     #Transaminitis   -f/u on acute hepatitis panel  -if persistently elevated can get US abdomen as outpt     #DVT Prophylaxis   -on Lovenox     #DIET  -DASH/TLC     Case and plan discussed with Dr. Tolu Rouse  60 y/o F with PMHx of HTN, Asthma presents to ED from Family Practice after found to be in new onset Afib     ADMIT: TELE       #New Onset Afib   -EKG: (+)irregularly irregular, no ST segment changes, no TWI   -Troponin x1: mildly elevated, repeat troponins x2 q3  -f/u on D-dimer   -f/u on TSH   -[id number] Score: 2 (Female, HTN)   -Start Metoprolol Tartrate 25mg PO BID, titrate PRN   -Start Lovenox 1mg/kg BID   -ECHO in AM   -Cardiology consult     #RLE Pedal Edema   -f/u on duplex US     #HTN  -pt was diagnosed with HTN today and was started on HCTZ  -HOLD HCTZ  -Start Metoprolol Tartrate 25mg BID, titrate PRN     #Transaminitis   -f/u on acute hepatitis panel  -if persistently elevated can get US abdomen as outpt     #Hx of (+)CAROLE  -outpt f/u     #DVT Prophylaxis   -on Lovenox     #DIET  -DASH/TLC     Case and plan discussed with Dr. Tolu Rouse  62 y/o F with PMHx of HTN, Asthma presents to ED from Family Practice after found to be in new onset Afib     ADMIT: TELE       #New Onset Afib   -EKG: (+)irregularly irregular, no ST segment changes, no TWI   -Troponin x1: mildly elevated, repeat troponins x2 q3  -f/u on D-dimer   -f/u on TSH   -[id number] Score: 2 (Female, HTN)   -Start Metoprolol Tartrate 25mg PO BID, titrate PRN   -Start Lovenox 1mg/kg BID   -ECHO in AM   -Cardiology consult     #RLE Pedal Edema   -f/u on duplex US     #HTN  -pt was diagnosed with HTN today and was started on HCTZ  -HOLD HCTZ  -Start Metoprolol Tartrate 25mg BID, titrate PRN     #Transaminitis   -f/u on acute hepatitis panel  -if persistently elevated can get US abdomen as outpt     #Hx of (+)CAROLE  -?SLE  -outpt f/u     #DVT Prophylaxis   -on Lovenox     #DIET  -DASH/TLC     Case and plan discussed with Dr. Janak Rouse      62 y/o F with PMHx of HTN, Asthma presents to ED from Family Practice after found to be in new onset Afib     ADMIT: TELE       #New Onset Afib   -EKG: (+)irregularly irregular, no ST segment changes, no TWI   -Troponin x1: mildly elevated, repeat troponins x2 q3  -f/u on D-dimer   -f/u on TSH, Mg2+ and Phos   -[id number]-DIMv Score: 2 (Female, HTN)   -Start Metoprolol Tartrate 25mg PO BID, titrate PRN   -Start Lovenox 1mg/kg BID   -ECHO in AM   -Cardiology consult     #Elevated Troponin   -likely secondary to demand ischemia in setting of new onset afib   -trend troponin     #RLE Pedal Edema   -f/u on duplex US     #HTN  -pt was diagnosed with HTN today and was started on HCTZ  -HOLD HCTZ  -Start Metoprolol Tartrate 25mg BID, titrate PRN     #Transaminitis   -f/u on acute hepatitis panel  -if persistently elevated can get US abdomen as outpt     #Hx of (+)CAROLE  -?SLE  -outpt f/u     #DVT Prophylaxis   -on Lovenox     #DIET  -DASH/TLC     Case and plan discussed with Dr. Janak Rouse      60 y/o F with PMHx of HTN, Asthma presents to ED from Family Practice after found to be in new onset Afib     ADMIT: TELE       #New Onset Afib   -EKG: (+)irregularly irregular, no ST segment changes, no TWI   -Troponin x1: mildly elevated, repeat troponins x2 q3  -f/u on D-dimer   -f/u on TSH, Mg2+ and Phos   -[id number] Score: 2 (Female, HTN)   -Start Metoprolol Tartrate 25mg PO BID, titrate PRN   -Start Lovenox 1mg/kg BID   -ECHO in AM   -Cardiology consult     #Elevated Troponin   -likely secondary to demand ischemia in setting of new onset afib   -trend troponin     #RLE Pedal Edema   -f/u on duplex US     #HTN  -pt was diagnosed with HTN today and was started on HCTZ  -HOLD HCTZ  -Start Metoprolol Tartrate 25mg BID, titrate PRN     #Asthma  -Albuterol PRN     #Transaminitis   -f/u on acute hepatitis panel  -if persistently elevated can get US abdomen as outpt     #Hx of (+)CAROLE  -?SLE  -outpt f/u     #DVT Prophylaxis   -on Lovenox     #DIET  -DASH/TLC     Case and plan discussed with Dr. Janak Rouse

## 2022-04-13 NOTE — ED PROVIDER NOTE - CLINICAL SUMMARY MEDICAL DECISION MAKING FREE TEXT BOX
61 y f no reported past hx was seen in [facility] referred by resident dr valencia for new onset a fib and new onset uncontrolled htn, pt reported no chest pain, no sob , no headache no nausea vomiting  labs ekg cxr , labetolol for htn  case d/w [facility] resident Elvie will come to admit 61 y f no reported past hx was seen in [facility] referred by resident dr valencia for new onset a fib and new onset uncontrolled htn, pt reported no chest pain, no sob , no headache no nausea vomiting  labs ekg cxr , labetolol for htn  cbc cmp wnl first time troponin high   case d/w [facility] resident Elvie will come to admit

## 2022-04-13 NOTE — ED PROVIDER NOTE - OBJECTIVE STATEMENT
61 y f no reported past hx was seen in [facility] referred by resident dr valencia for new onset a fib and new onset uncontrolled htn, pt reported no chest pain, no sob , no headache no nausea vomiting

## 2022-04-13 NOTE — ED PROVIDER NOTE - CARE PLAN
Principal Discharge DX:	New onset atrial fibrillation  Secondary Diagnosis:	Uncontrolled hypertension   1

## 2022-04-13 NOTE — H&P ADULT - NSHPPHYSICALEXAM_GEN_ALL_CORE
Vitals  T(F): 97.6 (04-13-22 @ 16:18), Max: 97.6 (04-13-22 @ 16:18)  HR: 83 (04-13-22 @ 16:18) (83 - 83)  BP: 173/113 (04-13-22 @ 16:18) (173/113 - 173/113)  RR: 18 (04-13-22 @ 16:18) (18 - 18)  SpO2: 97% (04-13-22 @ 16:18) (97% - 97%)    PHYSICAL EXAM   Gen: NAD, comfortable, AA&Ox4  HEENT: head atraumatic and normocephalic, PEERLA, EOMI,  no gross abnormalities of ears, mucous membranes moist, no oral lesions, neck supple without masses/goiter/lymphadenopathy, no JVD  CVS: +s1, s2, (+)irregularly irregular, no murmurs, rubs or gallops, no thrill, normal PMI  Pulmonary: normal respiratory effort, clear to auscultation b/l, no wheezes/crackles/rhonchi  Abdomen: soft, non-tender, non-distended, +bowel sounds in all 4 quadrants, no masses noted, no guarding or rigidity   Back: no scoliosis, lordosis, or kyphosis, no point tenderness, no CVA tenderness   Extremities: 2+ pitting edema RLE, pedal pulses palpable, <2 sec. cap refill, negative Sydnee's and Garza   Skin: nl warm and dry, no wounds   Neuro: answering questions appropriately, face symmetric, sensation equal bilaterally in face, tongue midline, no dysarthria, 5/5 strength in upper and lower extremities bilaterally, sensation intact in upper and lower extremities bilaterally, nl finger to nose, and nl heel to shin

## 2022-04-14 LAB
A1C WITH ESTIMATED AVERAGE GLUCOSE RESULT: 5.7 % — HIGH (ref 4–5.6)
ALBUMIN SERPL ELPH-MCNC: 3.1 G/DL — LOW (ref 3.3–5)
ALP SERPL-CCNC: 74 U/L — SIGNIFICANT CHANGE UP (ref 40–120)
ALT FLD-CCNC: 34 U/L — SIGNIFICANT CHANGE UP (ref 10–45)
ANION GAP SERPL CALC-SCNC: 11 MMOL/L — SIGNIFICANT CHANGE UP (ref 5–17)
AST SERPL-CCNC: 30 U/L — SIGNIFICANT CHANGE UP (ref 10–40)
BILIRUB SERPL-MCNC: 0.6 MG/DL — SIGNIFICANT CHANGE UP (ref 0.2–1.2)
BUN SERPL-MCNC: 7 MG/DL — SIGNIFICANT CHANGE UP (ref 7–23)
CALCIUM SERPL-MCNC: 8.9 MG/DL — SIGNIFICANT CHANGE UP (ref 8.4–10.5)
CHLORIDE SERPL-SCNC: 109 MMOL/L — HIGH (ref 96–108)
CO2 SERPL-SCNC: 24 MMOL/L — SIGNIFICANT CHANGE UP (ref 22–31)
CREAT SERPL-MCNC: 0.67 MG/DL — SIGNIFICANT CHANGE UP (ref 0.5–1.3)
EGFR: 99 ML/MIN/1.73M2 — SIGNIFICANT CHANGE UP
ESTIMATED AVERAGE GLUCOSE: 117 MG/DL — HIGH (ref 68–114)
GLUCOSE SERPL-MCNC: 79 MG/DL — SIGNIFICANT CHANGE UP (ref 70–99)
HAV IGM SER-ACNC: SIGNIFICANT CHANGE UP
HBV CORE IGM SER-ACNC: SIGNIFICANT CHANGE UP
HBV SURFACE AG SER-ACNC: SIGNIFICANT CHANGE UP
HCV AB S/CO SERPL IA: 0.1 S/CO — SIGNIFICANT CHANGE UP (ref 0–0.99)
HCV AB SERPL-IMP: SIGNIFICANT CHANGE UP
MAGNESIUM SERPL-MCNC: 1.7 MG/DL — SIGNIFICANT CHANGE UP (ref 1.6–2.6)
NT-PROBNP SERPL-SCNC: 576 PG/ML — HIGH (ref 0–300)
PHOSPHATE SERPL-MCNC: 4 MG/DL — SIGNIFICANT CHANGE UP (ref 2.5–4.5)
POTASSIUM SERPL-MCNC: 3.4 MMOL/L — LOW (ref 3.5–5.3)
POTASSIUM SERPL-SCNC: 3.4 MMOL/L — LOW (ref 3.5–5.3)
PROT SERPL-MCNC: 6.4 G/DL — SIGNIFICANT CHANGE UP (ref 6–8.3)
SODIUM SERPL-SCNC: 144 MMOL/L — SIGNIFICANT CHANGE UP (ref 135–145)
TROPONIN I, HIGH SENSITIVITY RESULT: 39.8 NG/L — SIGNIFICANT CHANGE UP

## 2022-04-14 PROCEDURE — 93306 TTE W/DOPPLER COMPLETE: CPT | Mod: 26

## 2022-04-14 PROCEDURE — 99222 1ST HOSP IP/OBS MODERATE 55: CPT

## 2022-04-14 PROCEDURE — 71275 CT ANGIOGRAPHY CHEST: CPT | Mod: 26

## 2022-04-14 PROCEDURE — 99232 SBSQ HOSP IP/OBS MODERATE 35: CPT | Mod: GC

## 2022-04-14 PROCEDURE — 93010 ELECTROCARDIOGRAM REPORT: CPT

## 2022-04-14 RX ORDER — METOPROLOL TARTRATE 50 MG
12.5 TABLET ORAL
Refills: 0 | Status: DISCONTINUED | OUTPATIENT
Start: 2022-04-14 | End: 2022-04-16

## 2022-04-14 RX ORDER — APIXABAN 2.5 MG/1
5 TABLET, FILM COATED ORAL EVERY 12 HOURS
Refills: 0 | Status: DISCONTINUED | OUTPATIENT
Start: 2022-04-14 | End: 2022-04-15

## 2022-04-14 RX ADMIN — APIXABAN 5 MILLIGRAM(S): 2.5 TABLET, FILM COATED ORAL at 19:43

## 2022-04-14 RX ADMIN — Medication 650 MILLIGRAM(S): at 02:59

## 2022-04-14 RX ADMIN — Medication 650 MILLIGRAM(S): at 03:54

## 2022-04-14 RX ADMIN — Medication 12.5 MILLIGRAM(S): at 19:43

## 2022-04-14 NOTE — PATIENT PROFILE ADULT - NSPROEXTENSIONSOFSELF_GEN_A_NUR
HISTORY:  Past Medical History:   Diagnosis Date    Anxiety     Arthritis     CAD (coronary artery disease)     Depression     Hyperlipidemia     Kidney stone     Myocardial infarct ([facility] [address] 2016    Neck pain     Spinal stenosis 2017       Past Surgical History:   Procedure Laterality Date    BACK SURGERY      L5-S1, C5-7    CARDIAC CATHETERIZATION       SECTION      HIP SURGERY Left     LUNG BIOPSY Right     MASTOID SURGERY      TONSILLECTOMY         CURRENT MEDICATIONS:    Current Outpatient Medications:     Probiotic Acidophilus (FLORANEX) TABS, Take 1 tablet by mouth 2 times daily, Disp: 60 tablet, Rfl: 0    Insulin NPH Isophane & Regular (HUMULIN 70/30 KWIKPEN) (70-30) 100 UNIT per ML injection pen, Inject 25 Units into the skin 2 times daily, Disp: 5 pen, Rfl: 3    calcium carbonate (OS-BRANNON) 1250 (500 Ca) MG chewable tablet, Take 500-1,000 mg by mouth 3 times daily, Disp: , Rfl:     blood glucose test strips (FREESTYLE LITE) strip, 1 each by Does not apply route 2 times daily As needed. , Disp: 100 each, Rfl: 3    busPIRone (BUSPAR) 15 MG tablet, Take 15 mg by mouth 2 times daily, Disp: 180 tablet, Rfl: 1    levothyroxine (SYNTHROID) 50 MCG tablet, Take 1 tablet by mouth Daily, Disp: 90 tablet, Rfl: 1    metoprolol succinate (TOPROL XL) 100 MG extended release tablet, take 1 tablet by mouth twice a day, Disp: 180 tablet, Rfl: 1    sertraline (ZOLOFT) 100 MG tablet, Take 1.5 tablets by mouth daily, Disp: 135 tablet, Rfl: 1    tiZANidine (ZANAFLEX) 2 MG tablet, Take 1 tablet by mouth 3 times daily as needed (tension), Disp: 90 tablet, Rfl: 0    ranolazine (RANEXA) 500 MG extended release tablet, Take 1 tablet by mouth 2 times daily, Disp: 180 tablet, Rfl: 1    amLODIPine (NORVASC) 5 MG tablet, Take 1 tablet by mouth daily, Disp: 90 tablet, Rfl: 1    atorvastatin (LIPITOR) 80 MG tablet, Take 1 tablet by mouth daily, Disp: 90 tablet, Rfl: 1    clopidogrel (PLAVIX) 75 MG tablet, Take 1 tablet by mouth daily, Disp: 90 tablet, Rfl: 1    mirabegron (MYRBETRIQ) 50 MG TB24, Take 50 mg by mouth daily, Disp: 90 tablet, Rfl: 1    topiramate (TOPAMAX) 50 MG tablet, Take 1 tablet by mouth 2 times daily, Disp: 180 tablet, Rfl: 1    nitroGLYCERIN (NITROSTAT) 0.4 MG SL tablet, Place 1 tablet under the tongue every 5 minutes as needed for Chest pain, Disp: 25 tablet, Rfl: 0    albuterol sulfate  (90 Base) MCG/ACT inhaler, Inhale 2 puffs into the lungs every 4 hours as needed for Wheezing or Shortness of Breath, Disp: 18 g, Rfl: 1    albuterol (PROVENTIL) (2.5 MG/3ML) 0.083% nebulizer solution, Take 2.5 mg by nebulization every 4 hours as needed for Wheezing or Shortness of Breath, Disp: , Rfl:     fluticasone (FLONASE) 50 MCG/ACT nasal spray, 1 spray by Each Nostril route daily as needed for Rhinitis, Disp: , Rfl:     Multiple Vitamins-Minerals (THERAPEUTIC MULTIVITAMIN-MINERALS) tablet, Take 1 tablet by mouth daily, Disp: , Rfl:     ascorbic acid (VITAMIN C) 500 MG tablet, Take 500 mg by mouth daily, Disp: , Rfl:     polyethylene glycol (GLYCOLAX) 17 g packet, Take 17 g by mouth daily as needed , Disp: , Rfl:     acetaminophen (TYLENOL) 500 MG tablet, Take 2 tablets by mouth every 6 hours as needed for Pain, Disp: 30 tablet, Rfl: 0    Cholecalciferol (VITAMIN D) 50 MCG (2000 UT) TABS tablet, Take 2,000 Units by mouth daily , Disp: , Rfl:     FREESTYLE LANCETS MISC, 1 each by Does not apply route daily, Disp: 100 each, Rfl: 3    Insulin Pen Needle ([employer] PEN NEEDLES 31G) 31G X 8 MM MISC, 1 each by Does not apply route daily, Disp: 100 each, Rfl: 3    aspirin 81 MG chewable tablet, Take 81 mg by mouth daily, Disp: , Rfl:     metoprolol (LOPRESSOR) 100 MG tablet, Take 100 mg by mouth 2 times daily (Patient not taking: Reported on 10/13/2021), Disp: , Rfl:     buPROPion (ZYBAN) 150 MG extended release tablet, Take 1 tablet by mouth 2 times daily (Patient not taking: Reported on 10/13/2021), Disp: 180 tablet, Rfl: 1    ibuprofen (ADVIL;MOTRIN) 800 MG tablet, Take 1 tablet by mouth every 8 hours as needed for Pain (Patient not taking: Reported on 10/13/2021), Disp: 30 tablet, Rfl: 0    ALLERGIES:   Allergies   Allergen Reactions    Other Other (See Comments)     Sneezing, watery eyes, wheezing    Seasonal Other (See Comments)     Sneezing, watery eyes, wheezing       FAMILY HISTORY:       Problem Relation Age of Onset    Dementia Mother     Depression Mother     High Blood Pressure Mother     Coronary Art Dis Mother     COPD Mother     Diabetes Mother     Heart Attack Father     Diabetes Maternal Grandmother     Alzheimer's Disease Paternal Grandfather          SOCIAL HISTORY:   Social History     Socioeconomic History    Marital status:      Spouse name: Not on file    Number of children: Not on file    Years of education: Not on file    Highest education level: Not on file   Occupational History    Not on file   Tobacco Use    Smoking status: Current Some Day Smoker     Packs/day: 0.50     Years: 25.00     Pack years: 12.50     Types: Cigarettes    Smokeless tobacco: Never Used    Tobacco comment: 3 packs per week   Vaping Use    Vaping Use: Former   Substance and Sexual Activity    Alcohol use: Yes     Comment: social    Drug use: Yes     Frequency: 7.0 times per week     Types: Marijuana    Sexual activity: Yes   Other Topics Concern    Not on file   Social History Narrative    Not on file     Social Determinants of Health     Financial Resource Strain: Low Risk     Difficulty of Paying Living Expenses: Not hard at all   Food Insecurity: No Food Insecurity    Worried About Running Out of Food in the Last Year: Never true    Jim of Food in the Last Year: Never true   Transportation Needs: No Transportation Needs    Lack of Transportation (Medical): No    Lack of Transportation (Non-Medical):  No   Physical Activity:     Days of Exercise per Week:  Minutes of Exercise per Session:    Stress:     Feeling of Stress :    Social Connections:     Frequency of Communication with Friends and Family:     Frequency of Social Gatherings with Friends and Family:     Attends [employer] Services:     Active Member of Clubs or Organizations:     Attends Club or Organization Meetings:     Marital Status:    Intimate Partner Violence:     Fear of Current or Ex-Partner:     Emotionally Abused:     Physically Abused:     Sexually Abused:          REVIEW OF SYSTEMS:         Review of Systems   Constitutional: Negative for fever and unexpected weight change. HENT: Negative for sore throat, trouble swallowing and voice change. Eyes: Positive for visual disturbance. Respiratory: Positive for cough and shortness of breath. Negative for choking and wheezing. Cardiovascular: Negative for chest pain and leg swelling. Gastrointestinal: Positive for abdominal pain, constipation and nausea. Negative for abdominal distention, anal bleeding, blood in stool, diarrhea, rectal pain and vomiting. Genitourinary: Negative for difficulty urinating. Musculoskeletal: Negative for joint swelling. Skin: Negative for color change and rash. Neurological: Positive for weakness. Negative for dizziness, light-headedness and headaches. Hematological: Does not bruise/bleed easily. Psychiatric/Behavioral: Positive for sleep disturbance. Negative for confusion and hallucinations. The patient is nervous/anxious. PHYSICAL EXAMINATION: Vital signs reviewed per the nursing documentation. /74 (Site: Left Upper Arm, Position: Sitting, Cuff Size: Large Adult)   Pulse 79   Temp 97.2 °F (36.2 °C) (Temporal)   Ht 5' 3\" (1.6 m)   Wt 210 lb (95.3 kg)   SpO2 99%   BMI 37.20 kg/m²   Body mass index is 37.2 kg/m². Physical Exam  Nursing note reviewed. Constitutional:       Appearance: She is well-developed.       Comments: Anxious    HENT:      Head: Normocephalic and atraumatic. Eyes:      Conjunctiva/sclera: Conjunctivae normal.      Pupils: Pupils are equal, round, and reactive to light. Cardiovascular:      Heart sounds: Normal heart sounds. Pulmonary:      Effort: Pulmonary effort is normal.      Breath sounds: Normal breath sounds. Abdominal:      General: Bowel sounds are normal.      Palpations: Abdomen is soft. Comments: NON TENDER, NON DISTENTED  LIVER SPLEEN AND HERNIAS ARE NOT  PALPABLE  BOWEL SOUNDS ARE POSITIVE      Musculoskeletal:         General: Normal range of motion. Cervical back: Normal range of motion and neck supple. Skin:     General: Skin is warm. Neurological:      Mental Status: She is alert and oriented to person, place, and time.    Psychiatric:         Behavior: Behavior normal.           LABORATORY DATA: Reviewed  Lab Results   Component Value Date    WBC 14.5 (H) 09/19/2021    HGB 14.1 09/19/2021    HCT 43.2 09/19/2021    MCV 87.4 09/19/2021     (H) 09/19/2021     (L) 09/19/2021    K 3.8 09/19/2021     09/19/2021    CO2 20 09/19/2021    BUN 13 09/19/2021    CREATININE 0.83 09/19/2021    LABALBU 4.2 09/19/2021    BILITOT 0.28 (L) 09/19/2021    ALKPHOS 79 09/19/2021    AST 15 09/19/2021    ALT 20 09/19/2021         Lab Results   Component Value Date    RBC 4.94 09/19/2021    HGB 14.1 09/19/2021    MCV 87.4 09/19/2021    MCH 28.5 09/19/2021    MCHC 32.6 09/19/2021    RDW 14.2 09/19/2021    MPV 8.9 09/19/2021    BASOPCT 2 09/19/2021    LYMPHSABS 5.95 (H) 09/19/2021    MONOSABS 0.29 09/19/2021    NEUTROABS 7.53 09/19/2021    EOSABS 0.44 (H) 09/19/2021    BASOSABS 0.29 (H) 09/19/2021         DIAGNOSTIC TESTING:     XR CHEST PORTABLE    Result Date: 9/19/2021  EXAMINATION: ONE XRAY VIEW OF THE CHEST 9/19/2021 5:50 pm COMPARISON: 01/22/2021 HISTORY: ORDERING SYSTEM PROVIDED HISTORY: Chest Pain TECHNOLOGIST PROVIDED HISTORY: Chest Pain Reason for Exam: Chest Pain Acuity: Unknown Type of Exam: Unknown Relevant Medical/Surgical History: Chest Pain FINDINGS: Cardiomediastinal silhouette stable. Unchanged opacity peripherally in the right lung. No pneumothorax. No pleural effusion. No new airspace disease. Persistent opacity peripherally in the right lung, a cavitary lesion was seen in this region on CT. A CT of the chest may be helpful for further evaluation     CT CHEST PULMONARY EMBOLISM W CONTRAST    Result Date: 9/19/2021  EXAMINATION: CTA OF THE CHEST 9/19/2021 1:08 pm TECHNIQUE: CTA of the chest was performed after the administration of intravenous contrast.  Multiplanar reformatted images are provided for review. MIP images are provided for review. Dose modulation, iterative reconstruction, and/or weight based adjustment of the mA/kV was utilized to reduce the radiation dose to as low as reasonably achievable. COMPARISON: CT PA dated January 22, 2021 HISTORY: ORDERING SYSTEM PROVIDED HISTORY: Pain in the right chest, Abnormal xray TECHNOLOGIST PROVIDED HISTORY: Pain in the right chest, Abnormal xray Decision Support Exception - unselect if not a suspected or confirmed emergency medical condition->Emergency Medical Condition (MA) Reason for Exam: Pt c/o lower rib/chest pain since Friday with SOB. States gets frequent pneumonia without having symptoms. H/O pulmonary nodules, rt lung biopsy. Acuity: Unknown Type of Exam: Unknown FINDINGS: Pulmonary Arteries: Pulmonary arteries are adequately opacified for evaluation. No evidence of intraluminal filling defect to suggest pulmonary embolism. Main pulmonary artery is normal in caliber. Mediastinum: No evidence of mediastinal lymphadenopathy. Cardiomegaly is present. Coronary arterial calcifications are noted. The heart and pericardium demonstrate no acute abnormality. There is no acute abnormality of the thoracic aorta. Lungs/pleura: Focal fibrotic changes are present within the periphery of the right upper lobe.   Previously noted cavity is not demonstrated. Branching pattern of bronchiectasis is present within the periphery of the right upper lobe. A fine reticulonodular pattern is present throughout the lungs with an upper lobe predominance, with scattered subcentimeter nodules again noted. Changes are likely due to the patient's known history of Langerhans cell histiocytosis. Surgical staple line is present within the right middle lobe and anterior right lower lobe. No focal area of consolidation or pneumothorax is present. Vearl Pippins Upper Abdomen: Limited images of the upper abdomen are unremarkable. Soft Tissues/Bones: No acute bone or soft tissue abnormality. 1. No evidence of pulmonary embolism 2. Reticulonodular changes again noted throughout the lungs, with an upper lobe predominance, related to the history of Langerhans cell histiocytosis. No focal area of consolidation or pneumothorax is present . 3. Interval resolution of the previously noted cavity, within the right upper lobe. Bronchiectatic changes are now present within this area          Assessment  1. Positive colorectal cancer screening using Cologuard test    2. Anxiety    3. Smoker    4. Irritable bowel syndrome with constipation    5. Irritable bowel syndrome with both constipation and diarrhea        Plan    Plan EGD and Colonoscopy       The Endoscopic procedure was explained to the patient in detail  The prep and NPO were explained  All the Risks, Benefits, and Alternatives were explained  Risk of Bleeding, Perforation and Cardio Respiratory risks were explained  her questions were answered  The procedure has been scheduled with the  in the office  Patient was asked to give us a call for any questions  The patient has verbalized understanding and agreement to this plan. Pt was advised in detail about some life style and dietary modifications. She was advised about avoidance of caffeine, nicotine and chocolate.  Pt was also told to stay away from any kind of fast modifications  Patient's  questions were answered in this regard as well  The patient has verbalized understanding and agreement     Thank you for allowing me to participate in the care of Ms. Hector Xie. For any further questions please do not hesitate to contact me. I have reviewed and agree with the ROS entered by the MA/Nurse.          Marlene Riggs MD, [facility]  Board Certified in Gastroenterology and [address] [place] [address] Gastroenterology  Office #: [phone] Sunglasses Sunglasses/none

## 2022-04-14 NOTE — CONSULT NOTE ADULT - SUBJECTIVE AND OBJECTIVE BOX
CHIEF COMPLAINT:  Patient is a 61y old  Female who presents with a chief complaint of new onset afib (13 Apr 2022 18:00)    HPI:  62 y/o F with PMHx of HTN, Asthma, Hx of positive CAROLE (per chart review) presents to ED from Family Practice for abnormal EKG findings. Patient was evaluated in ED 2 days ago for sob. In ED, EKG showed atrial fibrillation, patient was diagnosed with Viral URI and was sent home and advised to f/u at  today. Today pt was evaluated at  and repeat EKG showed atrial fibrillation. Patient was sent to ED for further management. Patient currently reports of Aguilar and some "mucus", states started few days ago. Patient denies fever, chills, headache, dizziness, change in vision, blurry vision, chest pain, palpitations, , abdominal pain, nausea, vomiting, diarrhea, constipation, hematochezia, melena, change in bowel movement, weight loss, dysuria, urinary frequency, urgency, hematuria, numbness, weakness or tingling. No recent travel, no sick contacts. No Hx of CAD, FHx of CAD. Denies Hx of DVT/PE.    (13 Apr 2022 18:00)    Seen and examined. History above. No current sob, c/p, palpitations. No prior BP medications      PMH:   No pertinent past medical history    COVID-19 vaccine series completed    HTN (hypertension)    Asthma    H/O aneurysm        PSH:   No significant past surgical history      FAMILY HISTORY:  No pertinent family history in first degree relatives    FH: gastric cancer (Father)    FH: HTN (hypertension) (Mother)    FH: aneurysm (Mother, Sibling)        SOCIAL HISTORY:  Smoking:no  Alcohol:  no   Drugs:    ALLERGIES:  No Known Allergies      Home Medications:      MEDICATIONS:  acetaminophen     Tablet .. 650 milliGRAM(s) Oral every 6 hours PRN  enoxaparin Injectable 75 milliGRAM(s) SubCutaneous every 12 hours  melatonin 3 milliGRAM(s) Oral at bedtime PRN  ondansetron Injectable 4 milliGRAM(s) IV Push every 8 hours PRN      REVIEW OF SYSTEMS:  as noted in H and P reviewed    PHYSICAL EXAM:  T(C): 36.2 (04-14-22 @ 05:27), Max: 36.4 (04-13-22 @ 16:18)  HR: 50 (04-14-22 @ 05:27) (50 - 85)  BP: 122/75 (04-14-22 @ 05:27) (122/75 - 173/113)  RR: 16 (04-14-22 @ 05:27) (14 - 19)  SpO2: 100% (04-14-22 @ 05:27) (96% - 100%)  Wt(kg): --    GENERAL: NAD, well-groomed, well-developed  HEAD:  Atraumatic, Normocephalic  EYES: EOMI, conjunctiva and sclera clear  ENT: Moist mucous membranes,  NECK: Supple, No JVD, no bruits  CHEST/LUNG: Clear to ausculation and percussion bilaterally; No rales, rhonchi, wheezing, or rubs  HEART: irregular no significant murmur  ABDOMEN: Soft, Nontender, Nondistended; Bowel sounds present  EXTREMITIES:  , No clubbing, cyanosis, or edema  SKIN: No rashes or lesions  NERVOUS SYSTEM:  Alert , No focal deficits    Cardiovascular Diagnostic Testing:  ECG:  < from: 12 Lead ECG (04.14.22 @ 03:08) >    Ventricular Rate 63 BPM    Atrial Rate 81 BPM    QRS Duration 92 ms    Q-T Interval 470 ms    QTC Calculation(Bazett) 480 ms    R Axis -20 degrees    T Axis 39 degrees    Diagnosis Line Atrial fibrillation  Cannot rule out Anterior infarct , age undetermined  Abnormal ECG  When compared with ECG of 13-APR-2022 16:49,  No significant change was found  Confirmed by ABIDA READ, HUMZA GOMEZ ([id number]) on 4/14/2022 8:38:05 AM    < end of copied text >      LABS:                        12.3   6.48  )-----------( 199      ( 13 Apr 2022 17:10 )             37.0     04-14    144  |  109<H>  |  7   ----------------------------<  79  3.4<L>   |  24  |  0.67    Ca    8.9      14 Apr 2022 07:30  Phos  4.0     04-14  Mg     1.7     04-14    TPro  6.4  /  Alb  3.1<L>  /  TBili  0.6  /  DBili  x   /  AST  30  /  ALT  34  /  AlkPhos  74  04-14      Serum Pro-Brain Natriuretic Peptide: 576 pg/mL (04-14 @ 07:30)      Thyroid Stimulating Hormone, Serum: 0.484 uIU/mL (04-13 @ 17:10)    Troponin I, High Sensitivity Result: 77.2 ng/L (04-13-22 @ 17:10)  Troponin I, High Sensitivity Result: 39.8 ng/L (04-14-22 @ 01:20)      Telemetry:   af controlled vr    IMAGING:  < from: US Duplex Venous Lower Ext Complete, Bilateral (04.13.22 @ 19:41) >    ACC: [id number] EXAM:  US DPLX LWR EXT VEINS COMPL BI                          PROCEDURE DATE:  04/13/2022          INTERPRETATION:  CLINICAL INFORMATION: New onset of atrial fibrillation    COMPARISON: 1/22/2018.    TECHNIQUE: Duplex sonography of the BILATERAL LOWER extremity veins with   color and spectral Doppler, with and without compression.    FINDINGS:    RIGHT:  Normal compressibility of the RIGHT common femoral, femoral and popliteal   veins.  Doppler examination shows normal spontaneous and phasic flow.  No RIGHT calf vein thrombosis is detected.    LEFT:  Normal compressibility of the LEFT common femoral, femoral and popliteal   veins.  Doppler examination shows normal spontaneous and phasic flow.  No LEFT calf vein thrombosis is detected.    IMPRESSION:  No evidence of deep venous thrombosis in either lower extremity.      --- End of Report ---      < end of copied text >    < from: Xray Chest 2 Views PA/Lat (04.11.22 @ 17:15) >    ACC: [id number] EXAM:  XR CHEST PA LAT 2V                          PROCEDURE DATE:  04/11/2022          INTERPRETATION:  EXAMINATION: XR CHEST PA AND LATERAL    CLINICAL INDICATION: Cough. Nasal congestions    TECHNIQUE: 2 views; Frontal and lateral views of the chest were obtained.    COMPARISON: Chest x-ray 4/17/2012.    FINDINGS:  The heart is not accurately assessed in this AP projection. The   mediastinum and nila are unremarkable.  Tortuous thoracic aorta.  The lungs are clear.  There is nopneumothorax or pleural effusion.  No acute bony abnormality.    IMPRESSION:  Clear lungs.    --- End of Report ---           FABIO ULLOA MD; Resident Interventional Radiology  This document has been electronically signed.  BRY BALLARD MD; Attending Radiologist  This document has been electronically signed. Apr 11 2022  5:24PM    < end of copied text >

## 2022-04-14 NOTE — PATIENT PROFILE ADULT - OVER THE PAST TWO WEEKS, HAVE YOU FELT LITTLE INTEREST OR PLEASURE IN DOING THINGS?
"Nephrology  Progress Note    Admit Date: 7/27/2020   LOS: 0 days     SUBJECTIVE:     Follow-up For:  Primary osteoarthritis of left knee    Interval History:     Uneventful night.  Doing well with therapy.  No CP/SOB/Calf pain.  Discussed with ortho team.     Review of Systems:  Constitutional: No fever or chills  Respiratory: No cough or shortness of breath  Cardiovascular: No chest pain or palpitations  Gastrointestinal: No nausea or vomiting  Neurological: No confusion or weakness    OBJECTIVE:     Vital Signs Range (Last 24H):  BP (!) 106/50 (BP Location: Right arm, Patient Position: Sitting)   Pulse (!) 58   Temp 98.3 °F (36.8 °C) (Oral)   Resp 17   Ht 5' 2" (1.575 m)   Wt 76.2 kg (167 lb 15.9 oz)   SpO2 (!) 93%   Breastfeeding No   BMI 30.73 kg/m²     Temp:  [96.1 °F (35.6 °C)-98.3 °F (36.8 °C)]   Pulse:  [56-80]   Resp:  [16-18]   BP: ()/(44-94)   SpO2:  [93 %-100 %]     I & O (Last 24H):    Intake/Output Summary (Last 24 hours) at 7/28/2020 0824  Last data filed at 7/27/2020 1801  Gross per 24 hour   Intake 2580 ml   Output 500 ml   Net 2080 ml       Physical Exam:  General appearance: Well developed, well nourished  Eyes:  Conjunctivae/corneas clear. PERRL.  Lungs: Normal respiratory effort,   clear to auscultation bilaterally   Heart: Regular rate and rhythm, S1, S2 normal, no murmur, rub or pauline.  Abdomen: Soft, non-tender non-distended; bowel sounds normal; no masses,  no organomegaly  Extremities: No cyanosis or clubbing. No edema.    Skin: Skin color, texture, turgor normal. No rashes or lesions  Neurologic: Normal strength and tone. No focal numbness or weakness   Left knee CDI    Laboratory Data:  Recent Labs   Lab 07/28/20  0357   WBC 6.85   RBC 3.11*   HGB 10.5*   HCT 31.9*   PLT 99*   *   MCH 33.8*   MCHC 32.9       BMP:   Recent Labs   Lab 07/28/20  0357   GLU 73      K 4.5      CO2 24   BUN 26*   CREATININE 1.1   CALCIUM 8.1*     Lab Results   Component Value " Date    CALCIUM 8.1 (L) 07/28/2020       Lab Results   Component Value Date    CALCIUM 8.1 (L) 07/28/2020     POCT Glucose   Date Value Ref Range Status   07/27/2020 145 (H) 70 - 110 mg/dL Final   07/27/2020 111 (H) 70 - 110 mg/dL Final     No results found for: URICACID    BNP  No results for input(s): BNP, BNPTRIAGEBLO in the last 168 hours.    Medications:  Medication list was reviewed and changes noted under Assessment/Plan.    Diagnostic Results:        ASSESSMENT/PLAN:     1. S/P Left Knee (M17.12):  Per ortho/therapy teams.  2. CAD/TAVR (I25.10):  Followed by /[facility] cards.  Place on tele and consulted Dr. Olvera to follow along.  Much appreciated.   3. CKD III (N18.3):  Baseline creat 1-1.1.  Cont ACE.  No NSAIDS.  Renally dose meds, avoid nephrotoxins, and monitor I/O's closely.  RFP stable.   4. HTN (I12.9):  meds with hold parameters.  5. Hypothyroid (E03.9):  Synthroid home dose.  6. DM on insulin pump (E10.22):  Follows Dr. Barreto at .  Continue medtronic 670G with current setting.  Change IVF's to NS.  ADA diet.  CBG 4-6/day and allow pt to bolus with meals.  See orders.  CBG stable.   7. Essential Tremor (G25.0):  neurontin as home.  8. DVT prophy:  ASA BID.       Ok to DC from renal.               no

## 2022-04-14 NOTE — PATIENT PROFILE ADULT - TRANSPORTATION - OTHER DETAILS
Patient is able to work with public transportation, is concerned about where doctors would be located

## 2022-04-14 NOTE — CONSULT NOTE ADULT - ASSESSMENT
recent dx of AF and elevated bp      suggest low dose b blocker  oral a/c ( eliquis if possible)  echocardiogram  outpatient follow up

## 2022-04-14 NOTE — PROVIDER CONTACT NOTE (OTHER) - ASSESSMENT
Pt is AOx4, denies chest pain, headache, nvd, sob. RN was called to pt's room d/t cardiac monitor alarms. RN observed HR drop to 49 but not sustain. Pt received bedtime dose Metoprolol 25mg PO at 2330.
Pt is AOx4, denies cp ha nvd sob. No acute s/s of discomfort or distress.
Pt is AOx4, verbalized feeling chest tightness. HR observed dropped to 46. Non-sustaining. 152/72, HR now 55, RR 14, 100% RA.

## 2022-04-14 NOTE — PROGRESS NOTE ADULT - SUBJECTIVE AND OBJECTIVE BOX
S:  60 y/o F with PMH of HTN, asthma, and history of positive CAROLE (per chart review) presented to  ED on 4/13/22 from Family Practice for abnormal EKG findings. Patient was evaluated in [facility] on 4/11/22 for shortness of breath. In [facility] ED, EKG showed atrial fibrillation and patient was diagnosed with viral URI and was sent home and advised to follow-up with family practice on 4/13/22. In the clinic, repeat EKG showed atrial fibrillation, so she was sent to the ED for further management.     4/14: Today is hospital day 1. Overnight patient became bradycardic so her metoprolol was held. This morning patient's HR is well controlled. She is in shock of her new diagnosis.     O:  Vital Signs Last 24 Hrs  T(C): 36.5 (15 Apr 2022 00:00), Max: 36.6 (14 Apr 2022 20:28)  T(F): 97.7 (15 Apr 2022 00:00), Max: 97.8 (14 Apr 2022 20:28)  HR: 81 (15 Apr 2022 00:00) (50 - 81)  BP: 147/96 (15 Apr 2022 00:00) (122/75 - 165/90)  BP(mean): 107 (14 Apr 2022 12:53) (107 - 107)  RR: 16 (15 Apr 2022 00:00) (14 - 17)  SpO2: 96% (15 Apr 2022 00:00) (96% - 100%)    Physical Exam:  Gen: NAD, comfortable, AA&Ox4  CVS: +s1, s2, (+)irregularly irregular, no murmurs, rubs or gallops  Pulmonary: normal respiratory effort, clear to auscultation b/l, no wheezes/crackles/rhonchi  Abdomen: soft, non-tender, non-distended, +bowel sounds in all 4 quadrants, no masses noted, no guarding or rigidity   Skin: nl warm and dry, no wounds   Neuro: answering questions appropriately    LABS:                    12.3   6.48  )-----------( 199      ( 13 Apr 2022 17:10 )             37.0     04-14    144  |  109<H>  |  7   ----------------------------<  79  3.4<L>   |  24  |  0.67    Ca    8.9      14 Apr 2022 07:30  Phos  4.0     04-14  Mg     1.7     04-14    TPro  6.4  /  Alb  3.1<L>  /  TBili  0.6  /  DBili  x   /  AST  30  /  ALT  34  /  AlkPhos  74  04-14    IMAGING:  < from: US Duplex Venous Lower Ext Complete, Bilateral (04.13.22 @ 19:41) >  IMPRESSION:  No evidence of deep venous thrombosis in either lower extremity.    < end of copied text >  < from: CT Angio Chest PE Protocol w/ IV Cont (04.14.22 @ 14:47) >  IMPRESSION: No evidence of pulmonary embolism.    < end of copied text >  < from: TTE Echo Complete w/o Contrast w/ Doppler (04.14.22 @ 08:55) >  Summary:   1. Left ventricular ejection fraction, by visual estimation, is 40 to   45%.   2. Mildly to moderately decreased global left ventricular systolic   function.   3. Severely enlarged left atrium.   4. Spectral Doppler shows restrictive pattern of left ventricular   myocardial filling (Grade III diastolic dysfunction).   5. Right atrial enlargement.   6. Moderate to severe mitral valve regurgitation.   7. Thickening of the anterior and posterior mitral valve leaflets.   8. Moderate tricuspid regurgitation.   9. Sclerotic aortic valve with normal opening.  10. Estimated pulmonary artery systolic pressure is 43.9 mmHg assuming a   right atrial pressure of 15 mmHg, which is consistent withmild pulmonary   hypertension.    [id number] Darnell Dubois MD,[facility] , Electronically signed on   4/14/2022 at 1:50:33 PM    < end of copied text >    MEDICATIONS  (STANDING):  apixaban 5 milliGRAM(s) Oral every 12 hours  metoprolol tartrate 12.5 milliGRAM(s) Oral two times a day    MEDICATIONS  (PRN):  acetaminophen     Tablet .. 650 milliGRAM(s) Oral every 6 hours PRN Temp greater or equal to 38C (100.4F), Mild Pain (1 - 3)  melatonin 3 milliGRAM(s) Oral at bedtime PRN Insomnia  ondansetron Injectable 4 milliGRAM(s) IV Push every 8 hours PRN Nausea and/or Vomiting                 S:  62 y/o F with PMH of HTN, asthma, and history of positive CAROLE (per chart review) presented to  ED on 4/13/22 from Family Practice for abnormal EKG findings. Patient was evaluated in [facility] on 4/11/22 for shortness of breath. In [facility] ED, EKG showed atrial fibrillation and patient was diagnosed with viral URI and was sent home and advised to follow-up with family practice on 4/13/22. In the clinic, repeat EKG showed atrial fibrillation, so she was sent to the ED for further management.     4/14: Today is hospital day 1. Overnight patient became bradycardic so her metoprolol was held. This morning patient's HR is well controlled.   O:  Vital Signs Last 24 Hrs  T(C): 36.5 (15 Apr 2022 00:00), Max: 36.6 (14 Apr 2022 20:28)  T(F): 97.7 (15 Apr 2022 00:00), Max: 97.8 (14 Apr 2022 20:28)  HR: 81 (15 Apr 2022 00:00) (50 - 81)  BP: 147/96 (15 Apr 2022 00:00) (122/75 - 165/90)  BP(mean): 107 (14 Apr 2022 12:53) (107 - 107)  RR: 16 (15 Apr 2022 00:00) (14 - 17)  SpO2: 96% (15 Apr 2022 00:00) (96% - 100%)    Physical Exam:  Gen: NAD, comfortable, AA&Ox4  CVS: +s1, s2, (+)irregularly irregular, no murmurs, rubs or gallops  Pulmonary: normal respiratory effort, clear to auscultation b/l, no wheezes/crackles/rhonchi  Abdomen: soft, non-tender, non-distended, +bowel sounds in all 4 quadrants, no masses noted, no guarding or rigidity   Skin: nl warm and dry, no wounds   Neuro: answering questions appropriately    LABS:                    12.3   6.48  )-----------( 199      ( 13 Apr 2022 17:10 )             37.0     04-14    144  |  109<H>  |  7   ----------------------------<  79  3.4<L>   |  24  |  0.67    Ca    8.9      14 Apr 2022 07:30  Phos  4.0     04-14  Mg     1.7     04-14    TPro  6.4  /  Alb  3.1<L>  /  TBili  0.6  /  DBili  x   /  AST  30  /  ALT  34  /  AlkPhos  74  04-14    IMAGING:  < from: US Duplex Venous Lower Ext Complete, Bilateral (04.13.22 @ 19:41) >  IMPRESSION:  No evidence of deep venous thrombosis in either lower extremity.    < end of copied text >  < from: CT Angio Chest PE Protocol w/ IV Cont (04.14.22 @ 14:47) >  IMPRESSION: No evidence of pulmonary embolism.    < end of copied text >  < from: TTE Echo Complete w/o Contrast w/ Doppler (04.14.22 @ 08:55) >  Summary:   1. Left ventricular ejection fraction, by visual estimation, is 40 to   45%.   2. Mildly to moderately decreased global left ventricular systolic   function.   3. Severely enlarged left atrium.   4. Spectral Doppler shows restrictive pattern of left ventricular   myocardial filling (Grade III diastolic dysfunction).   5. Right atrial enlargement.   6. Moderate to severe mitral valve regurgitation.   7. Thickening of the anterior and posterior mitral valve leaflets.   8. Moderate tricuspid regurgitation.   9. Sclerotic aortic valve with normal opening.  10. Estimated pulmonary artery systolic pressure is 43.9 mmHg assuming a   right atrial pressure of 15 mmHg, which is consistent withmild pulmonary   hypertension.    [id number] Darnell Dubois MD,[facility] , Electronically signed on   4/14/2022 at 1:50:33 PM    < end of copied text >    MEDICATIONS  (STANDING):  apixaban 5 milliGRAM(s) Oral every 12 hours  metoprolol tartrate 12.5 milliGRAM(s) Oral two times a day    MEDICATIONS  (PRN):  acetaminophen     Tablet .. 650 milliGRAM(s) Oral every 6 hours PRN Temp greater or equal to 38C (100.4F), Mild Pain (1 - 3)  melatonin 3 milliGRAM(s) Oral at bedtime PRN Insomnia  ondansetron Injectable 4 milliGRAM(s) IV Push every 8 hours PRN Nausea and/or Vomiting

## 2022-04-14 NOTE — CHART NOTE - NSCHARTNOTEFT_GEN_A_CORE
Overnight, s/p 1 dose of metoprolol. Pt with intermittent jaymie to 40s. Pt seen and examined. Stated she had some mild chest pressure and had just taken tylenol and no sxs. Denied palps, dizziness, diaphoresis. BP mostly hypertensive.   EKG slow afib.   Hold metoprolol. initial trop elevated but repeat downtrended. repeat trop in am. on asa and lovenox ac.

## 2022-04-14 NOTE — PROVIDER CONTACT NOTE (OTHER) - ACTION/TREATMENT ORDERED:
Provider d/c'd orders for Metoprolol 25mg PO. No other orders. Patient safety maintained. RN will continue to monitor pt.
Provider made aware. No interventions at this time. RN will continue to monitor pt.
Provider made aware. Ordered EKG, completed and in chart. Tylenol provided. No further interventions. RN will continue to monitor pt.

## 2022-04-14 NOTE — PATIENT PROFILE ADULT - FALL HARM RISK - UNIVERSAL INTERVENTIONS
Bed in lowest position, wheels locked, appropriate side rails in place/Call bell, personal items and telephone in reach/Instruct patient to call for assistance before getting out of bed or chair/Non-slip footwear when patient is out of bed/[place] to call system/Physically safe environment - no spills, clutter or unnecessary equipment/Purposeful Proactive Rounding/Room/bathroom lighting operational, light cord in reach

## 2022-04-15 ENCOUNTER — APPOINTMENT (OUTPATIENT)
Dept: FAMILY MEDICINE | Facility: HOSPITAL | Age: 62
End: 2022-04-15

## 2022-04-15 ENCOUNTER — TRANSCRIPTION ENCOUNTER (OUTPATIENT)
Age: 62
End: 2022-04-15

## 2022-04-15 DIAGNOSIS — Z23 ENCOUNTER FOR IMMUNIZATION: ICD-10-CM

## 2022-04-15 DIAGNOSIS — J45.909 UNSPECIFIED ASTHMA, UNCOMPLICATED: ICD-10-CM

## 2022-04-15 DIAGNOSIS — Z29.9 ENCOUNTER FOR PROPHYLACTIC MEASURES, UNSPECIFIED: ICD-10-CM

## 2022-04-15 DIAGNOSIS — I49.9 CARDIAC ARRHYTHMIA, UNSPECIFIED: ICD-10-CM

## 2022-04-15 DIAGNOSIS — I10 ESSENTIAL (PRIMARY) HYPERTENSION: ICD-10-CM

## 2022-04-15 LAB
ANION GAP SERPL CALC-SCNC: 9 MMOL/L — SIGNIFICANT CHANGE UP (ref 5–17)
APTT BLD: 34.4 SEC — SIGNIFICANT CHANGE UP (ref 27.5–35.5)
BUN SERPL-MCNC: 8 MG/DL — SIGNIFICANT CHANGE UP (ref 7–23)
CALCIUM SERPL-MCNC: 8.8 MG/DL — SIGNIFICANT CHANGE UP (ref 8.4–10.5)
CHLORIDE SERPL-SCNC: 105 MMOL/L — SIGNIFICANT CHANGE UP (ref 96–108)
CO2 SERPL-SCNC: 28 MMOL/L — SIGNIFICANT CHANGE UP (ref 22–31)
CREAT SERPL-MCNC: 0.72 MG/DL — SIGNIFICANT CHANGE UP (ref 0.5–1.3)
EGFR: 94 ML/MIN/1.73M2 — SIGNIFICANT CHANGE UP
GLUCOSE SERPL-MCNC: 82 MG/DL — SIGNIFICANT CHANGE UP (ref 70–99)
INR BLD: 1.29 RATIO — HIGH (ref 0.88–1.16)
MAGNESIUM SERPL-MCNC: 1.8 MG/DL — SIGNIFICANT CHANGE UP (ref 1.6–2.6)
PHOSPHATE SERPL-MCNC: 4.4 MG/DL — SIGNIFICANT CHANGE UP (ref 2.5–4.5)
POTASSIUM SERPL-MCNC: 3 MMOL/L — LOW (ref 3.5–5.3)
POTASSIUM SERPL-SCNC: 3 MMOL/L — LOW (ref 3.5–5.3)
PROTHROM AB SERPL-ACNC: 15 SEC — HIGH (ref 10.5–13.4)
SODIUM SERPL-SCNC: 142 MMOL/L — SIGNIFICANT CHANGE UP (ref 135–145)

## 2022-04-15 PROCEDURE — 99233 SBSQ HOSP IP/OBS HIGH 50: CPT

## 2022-04-15 PROCEDURE — 99239 HOSP IP/OBS DSCHRG MGMT >30: CPT | Mod: GC

## 2022-04-15 PROCEDURE — 99232 SBSQ HOSP IP/OBS MODERATE 35: CPT | Mod: GC

## 2022-04-15 RX ORDER — WARFARIN SODIUM 2.5 MG/1
1 TABLET ORAL
Qty: 30 | Refills: 0
Start: 2022-04-15 | End: 2022-05-14

## 2022-04-15 RX ORDER — APIXABAN 2.5 MG/1
5 TABLET, FILM COATED ORAL EVERY 12 HOURS
Refills: 0 | Status: DISCONTINUED | OUTPATIENT
Start: 2022-04-15 | End: 2022-04-16

## 2022-04-15 RX ORDER — POTASSIUM CHLORIDE 20 MEQ
20 PACKET (EA) ORAL
Refills: 0 | Status: COMPLETED | OUTPATIENT
Start: 2022-04-15 | End: 2022-04-15

## 2022-04-15 RX ORDER — LISINOPRIL 2.5 MG/1
10 TABLET ORAL DAILY
Refills: 0 | Status: DISCONTINUED | OUTPATIENT
Start: 2022-04-15 | End: 2022-04-15

## 2022-04-15 RX ORDER — POTASSIUM CHLORIDE 20 MEQ
10 PACKET (EA) ORAL
Refills: 0 | Status: DISCONTINUED | OUTPATIENT
Start: 2022-04-15 | End: 2022-04-15

## 2022-04-15 RX ORDER — LOSARTAN POTASSIUM 100 MG/1
25 TABLET, FILM COATED ORAL DAILY
Refills: 0 | Status: DISCONTINUED | OUTPATIENT
Start: 2022-04-15 | End: 2022-04-16

## 2022-04-15 RX ORDER — WARFARIN SODIUM 2.5 MG/1
5 TABLET ORAL ONCE
Refills: 0 | Status: COMPLETED | OUTPATIENT
Start: 2022-04-15 | End: 2022-04-15

## 2022-04-15 RX ORDER — METOPROLOL TARTRATE 50 MG
12.5 TABLET ORAL
Qty: 0 | Refills: 0 | DISCHARGE
Start: 2022-04-15

## 2022-04-15 RX ADMIN — Medication 100 MILLIEQUIVALENT(S): at 13:05

## 2022-04-15 RX ADMIN — Medication 650 MILLIGRAM(S): at 21:45

## 2022-04-15 RX ADMIN — APIXABAN 5 MILLIGRAM(S): 2.5 TABLET, FILM COATED ORAL at 20:06

## 2022-04-15 RX ADMIN — Medication 650 MILLIGRAM(S): at 21:14

## 2022-04-15 RX ADMIN — Medication 3 MILLIGRAM(S): at 21:14

## 2022-04-15 RX ADMIN — LOSARTAN POTASSIUM 25 MILLIGRAM(S): 100 TABLET, FILM COATED ORAL at 18:23

## 2022-04-15 RX ADMIN — APIXABAN 5 MILLIGRAM(S): 2.5 TABLET, FILM COATED ORAL at 05:42

## 2022-04-15 RX ADMIN — WARFARIN SODIUM 5 MILLIGRAM(S): 2.5 TABLET ORAL at 21:14

## 2022-04-15 RX ADMIN — Medication 20 MILLIEQUIVALENT(S): at 16:04

## 2022-04-15 RX ADMIN — Medication 100 MILLIEQUIVALENT(S): at 11:57

## 2022-04-15 RX ADMIN — Medication 12.5 MILLIGRAM(S): at 17:12

## 2022-04-15 RX ADMIN — Medication 20 MILLIEQUIVALENT(S): at 17:11

## 2022-04-15 NOTE — DISCHARGE NOTE NURSING/CASE MANAGEMENT/SOCIAL WORK - PATIENT PORTAL LINK FT
You can access the FollowMyHealth Patient Portal offered by [facility] by registering at the following website: [email or web address]. By joining [facility]’s FollowMyHealth portal, you will also be able to view your health information using other applications (apps) compatible with our system.

## 2022-04-15 NOTE — DISCHARGE NOTE PROVIDER - NSDCFUSCHEDAPPT_GEN_ALL_CORE_FT
ROSANGELA CHAMPION ; 04/18/2022 ; [place]  [facility]  ROSANGELA CHAMPION ; 05/06/2022 ; [place] [address] [address]

## 2022-04-15 NOTE — PHARMACOTHERAPY INTERVENTION NOTE - COMMENTS
Counseled on new medication warfarin. Reviewed reason for use, directions for use and possible side effects. Discussed signs and symptoms of bleeding to watch for and when to seek medical attention. Educated on importance of medical followup and for INR testing. Counseled on importance of keeping vitamin K intake consistent and provided handout on foods with vitamin K content. Pt verbalized understanding, all questions answered.

## 2022-04-15 NOTE — DISCHARGE NOTE PROVIDER - PROVIDER TOKENS
PROVIDER:[TOKEN:[[id number]],SCHEDULEDAPPT:[04/18/2022],SCHEDULEDAPPTTIME:[08:30 AM],ESTABLISHEDPATIENT:[T]]

## 2022-04-15 NOTE — CHART NOTE - NSCHARTNOTEFT_GEN_A_CORE
Cancel discharge   Patient's BP prior to leaving 172/11   - Continue metoprolol 12.5 mg BID   - Start Losartan 25 mg daily   - F/u Lipid panel in AM    Since patient is now staying would give her an additional dose of Eliquis 5 mg now. She last received Eliquis at 5 am and warfarin at 5 pm.

## 2022-04-15 NOTE — PROGRESS NOTE ADULT - SUBJECTIVE AND OBJECTIVE BOX
S:  62 y/o F with PMH of HTN, asthma, and history of positive CAROLE (per chart review) presented to  ED on 4/13/22 from Family Practice for abnormal EKG findings. Patient was evaluated in [facility] on 4/11/22 for shortness of breath. In [facility] ED, EKG showed atrial fibrillation and patient was diagnosed with viral URI and was sent home and advised to follow-up with family practice on 4/13/22. In the clinic, repeat EKG showed atrial fibrillation, so she was sent to the ED for further management.     4/14: Today is hospital day 1. Overnight patient became bradycardic so her metoprolol was held. This morning patient's HR is well controlled.     4/15: Today is hospital day 2. Plan for discharge today cancelled as patient's blood pressure was too high. Will start on an ARB.     O:  Vital Signs Last 24 Hrs  T(C): 36.8 (15 Apr 2022 17:05), Max: 37 (15 Apr 2022 12:17)  T(F): 98.2 (15 Apr 2022 17:05), Max: 98.6 (15 Apr 2022 12:17)  HR: 88 (15 Apr 2022 17:05) (52 - 88)  BP: 176/117 (15 Apr 2022 18:06) (138/94 - 176/117)  RR: 17 (15 Apr 2022 17:05) (14 - 17)  SpO2: 99% (15 Apr 2022 17:05) (96% - 100%)    Physical Exam:  Gen: NAD, comfortable, AA&Ox4  CVS: +s1, s2, (+)irregularly irregular, no murmurs, rubs or gallops  Pulmonary: normal respiratory effort, clear to auscultation b/l, no wheezes/crackles/rhonchi  Abdomen: soft, non-tender, non-distended, +bowel sounds in all 4 quadrants, no masses noted, no guarding or rigidity   Skin: nl warm and dry, no wounds   Neuro: answering questions appropriately      LABS:    04-15    142  |  105  |  8   ----------------------------<  82  3.0<L>   |  28  |  0.72    Ca    8.8      15 Apr 2022 06:02  Phos  4.4     04-15  Mg     1.8     04-15    TPro  6.4  /  Alb  3.1<L>  /  TBili  0.6  /  DBili  x   /  AST  30  /  ALT  34  /  AlkPhos  74  04-14    PT/INR - ( 15 Apr 2022 11:18 )   PT: 15.0 sec;   INR: 1.29 ratio         PTT - ( 15 Apr 2022 11:18 )  PTT:34.4 sec      IMAGING:  < from: US Duplex Venous Lower Ext Complete, Bilateral (04.13.22 @ 19:41) >  IMPRESSION:  No evidence of deep venous thrombosis in either lower extremity.    < end of copied text >  < from: CT Angio Chest PE Protocol w/ IV Cont (04.14.22 @ 14:47) >  IMPRESSION: No evidence of pulmonary embolism.    < end of copied text >  < from: TTE Echo Complete w/o Contrast w/ Doppler (04.14.22 @ 08:55) >  Summary:   1. Left ventricular ejection fraction, by visual estimation, is 40 to   45%.   2. Mildly to moderately decreased global left ventricular systolic   function.   3. Severely enlarged left atrium.   4. Spectral Doppler shows restrictive pattern of left ventricular   myocardial filling (Grade III diastolic dysfunction).   5. Right atrial enlargement.   6. Moderate to severe mitral valve regurgitation.   7. Thickening of the anterior and posterior mitral valve leaflets.   8. Moderate tricuspid regurgitation.   9. Sclerotic aortic valve with normal opening.  10. Estimated pulmonary artery systolic pressure is 43.9 mmHg assuming a   right atrial pressure of 15 mmHg, which is consistent withmild pulmonary   hypertension.    [id number] Darnell Dubois MD,[facility] , Electronically signed on   4/14/2022 at 1:50:33 PM    < end of copied text >    MEDICATIONS  (STANDING):  apixaban 5 milliGRAM(s) Oral every 12 hours  losartan 25 milliGRAM(s) Oral daily  metoprolol tartrate 12.5 milliGRAM(s) Oral two times a day  warfarin 5 milliGRAM(s) Oral once    MEDICATIONS  (PRN):  acetaminophen     Tablet .. 650 milliGRAM(s) Oral every 6 hours PRN Temp greater or equal to 38C (100.4F), Mild Pain (1 - 3)  melatonin 3 milliGRAM(s) Oral at bedtime PRN Insomnia  ondansetron Injectable 4 milliGRAM(s) IV Push every 8 hours PRN Nausea and/or Vomiting

## 2022-04-15 NOTE — CHART NOTE - NSCHARTNOTEFT_GEN_A_CORE
To whom it may concern,     This letter is to certify that patient ROSANGELA CHAMPION.O.B. 60 was admitted to [facility] from 22 to 4/15/22. She may return to work when school is back in session. If you have any questions or concerns please call [phone].     Sincerely,     Bucky Whitten MD

## 2022-04-15 NOTE — PROGRESS NOTE ADULT - SUBJECTIVE AND OBJECTIVE BOX
Follow up for :   Af, lv dysfunction  SUBJ:  feels ok today, no sob, palpitation    PMH  No pertinent past medical history    COVID-19 vaccine series completed    HTN (hypertension)    Asthma    H/O aneurysm        MEDICATIONS  (STANDING):  metoprolol tartrate 12.5 milliGRAM(s) Oral two times a day  potassium chloride    Tablet ER 20 milliEquivalent(s) Oral every 2 hours  warfarin 5 milliGRAM(s) Oral once    MEDICATIONS  (PRN):  acetaminophen     Tablet .. 650 milliGRAM(s) Oral every 6 hours PRN Temp greater or equal to 38C (100.4F), Mild Pain (1 - 3)  melatonin 3 milliGRAM(s) Oral at bedtime PRN Insomnia  ondansetron Injectable 4 milliGRAM(s) IV Push every 8 hours PRN Nausea and/or Vomiting        PHYSICAL EXAM:  Vital Signs Last 24 Hrs  T(C): 37 (15 Apr 2022 12:17), Max: 37 (15 Apr 2022 12:17)  T(F): 98.6 (15 Apr 2022 12:17), Max: 98.6 (15 Apr 2022 12:17)  HR: 67 (15 Apr 2022 12:17) (52 - 81)  BP: 149/99 (15 Apr 2022 12:17) (138/94 - 159/65)  BP(mean): --  RR: 17 (15 Apr 2022 12:17) (14 - 17)  SpO2: 99% (15 Apr 2022 12:17) (96% - 100%)    GENERAL: NAD, well-groomed, well-developed  HEAD:  Atraumatic, Normocephalic  EYES:  conjunctiva and sclera clear  ENT: Moist mucous membranes,  NECK: Supple, No JVD, no bruits  CHEST/LUNG: Clear to auscultation bilaterally; No rales, rhonchi, wheezing, or rubs  HEART: irregular No murmurs, rubs, or gallops PMI non displaced.  ABDOMEN: Soft, Nontender, Nondistended; Bowel sounds present  EXTREMITIES:  2+ Peripheral Pulses, No clubbing, cyanosis, or edema  SKIN: No rashes or lesions  NERVOUS SYSTEM:  Alert       TELEMETRY:  af    ECG:  < from: 12 Lead ECG (22 @ 03:08) >    Ventricular Rate 63 BPM    Atrial Rate 81 BPM    QRS Duration 92 ms    Q-T Interval 470 ms    QTC Calculation(Bazett) 480 ms    R Axis -20 degrees    T Axis 39 degrees    Diagnosis Line Atrial fibrillation  Cannot rule out Anterior infarct , age undetermined  Abnormal ECG  When compared with ECG of 2022 16:49,  No significant change was found  Confirmed by ABIDA READ, DARNELL GOMEZ () on 2022 8:38:05 AM    < end of copied text >      LABS:                        12.3   6.48  )-----------( 199      ( 2022 17:10 )             37.0     04-15    142  |  105  |  8   ----------------------------<  82  3.0<L>   |  28  |  0.72    Ca    8.8      15 Apr 2022 06:02  Phos  4.4     04-15  Mg     1.8     04-15    TPro  6.4  /  Alb  3.1<L>  /  TBili  0.6  /  DBili  x   /  AST  30  /  ALT  34  /  AlkPhos  74      Troponin I, High Sensitivity Result: 77.2 ng/L (22 @ 17:10)  Troponin I, High Sensitivity Result: 39.8 ng/L (22 @ 01:20)    PT/INR - ( 15 Apr 2022 11:18 )   PT: 15.0 sec;   INR: 1.29 ratio         PTT - ( 15 Apr 2022 11:18 )  PTT:34.4 sec  Serum Pro-Brain Natriuretic Peptide: 576 pg/mL (22 @ 07:30)    I&O's Summary        RADIOLOGY & ADDITIONAL STUDIES:    ECHO:  < from: TTE Echo Complete w/o Contrast w/ Doppler (22 @ 08:55) >    ACC: [id number] EXAM:  ECHO TTE WO CON COMP W DOPP                          PROCEDURE DATE:  2022          INTERPRETATION:  TRANSTHORACIC ECHOCARDIOGRAM REPORT        Patient Name:   ROSANGELA CHAMPION Patient Location: [address] Rec #:  [id number]         Accession #:      [id number]  Account #:      [id number]           Height:           65.0 in 165.0 cm  YOB: 1960        Weight:           162.9 lb 73.90 kg  Patient Age:    61 years         BSA:              1.81 m²  Patient Gender: F                BP:               122/75 mmHg      Date of Exam:        2022 8:55:41 AM  Sonographer:         Clovis Hart  Referring Physician: SALLY JACOBS    Procedure:     2D Echo/Doppler/Color Doppler Complete.  Indications:   Palpitations - R00.2  Diagnosis:     Nonrheumatic mitral (valve) insufficiency - I34.0  Study Details: Technically fair study. Study quality was adversely   affected due                 to arrhythmia.        2D AND M-MODE MEASUREMENTS (normal ranges within parentheses):  Left                 Normal   Aorta/Left            Normal  Ventricle:                    Atrium:  IVSd (2D):    0.87  (0.7-1.1) Aortic Root   3.08  (2.4-3.7)                 cm             (2D):          cm  LVPWd (2D):   0.95  (0.7-1.1) Aortic Root   2.81  (2.4-3.7)                 cm             (Mmode):       cm  LVIDd (2D):   5.73  (3.4-5.7) AoV Cusp      1.74  (1.5-2.6)                 cm             Separation:    cm  LVIDs (2D):   4.52            Left Atrium   4.20  (1.9-4.0)                 cm             (2D):          cm  LV FS (2D):   21.0   (>25%)   Left Atrium   4.17  (1.9-4.0)                  %             (Mmode):       cm  LV EF (2D):   42 %   (>55%)   LA Volume     61.5  Relative Wall 0.33   (<0.42)  Index  ml/m²  Thickness                     Right Ventricle:                                TAPSE:           2.37 cm    LV SYSTOLIC FUNCTION BY 2D PLANIMETRY (MOD):  EF-A4C View: 44.6 % EF-A2C View: 41.1 % EF-Biplane: 44 %    SPECTRAL DOPPLER ANALYSIS (where applicable):  Aortic Valve: AoV Max David: 1.29 m/s AoV Peak P.6 mmHg AoV Mean P.4 mmHg    LVOT Vmax: 0.81 m/s LVOT VTI: 0.168 m LVOT Diameter: 2.01 cm    AoV Area, Vmax: 2.01 cm² AoV Area, VTI: 1.88 cm² AoV Area, Vmn: 1.60 cm²  Ao VTI: 0.283  Aortic Insufficiency:  AI Half-time:  738 msec  AI Decel Rate: 1.26 m/s²    Tricuspid Valve and PA/RV Systolic Pressure: TR Max Velocity: 2.69 m/s RA   Pressure: 15 mmHg RVSP/PASP: 43.9 mmHg      PHYSICIAN INTERPRETATION:  Left Ventricle: The left ventricular internal cavity size is normal. Left   ventricular wall thickness is normal.  Global LV systolic function was mildly to moderately decreased. Left   ventricular ejection fraction, by visual estimation, is 40 to 45%.   Spectral Doppler shows restrictive pattern of left ventricular myocardial   filling (Grade III diastolic dysfunction).  Right Ventricle: Normal right ventricular size and function.  Left Atrium: Severely enlarged left atrium.  Right Atrium: Right atrial enlargement.  Pericardium: There is no evidence of pericardial effusion.  Mitral Valve: Thickening of the anterior and posterior mitral valve   leaflets. Mitral leaflet mobility is normal. Moderate to severe mitral   valve regurgitation is seen. The MR jet is eccentric laterally directed.  Tricuspid Valve: Structurally normal tricuspid valve, with normal leaflet   excursion. Moderate tricuspid regurgitation is visualized. Estimated   pulmonary artery systolic pressure is 43.9 mmHg assuming a right atrial   pressure of 15 mmHg, which is consistent with mild pulmonary hypertension.  Aortic Valve: The aortic valve is trileaflet. Sclerotic aortic valve with   normal opening. Trivial aortic valve regurgitation is seen.  Pulmonic Valve: Structurally normal pulmonicvalve, with normal leaflet   excursion. Trace pulmonic valve regurgitation.  Aorta: The aortic root, ascending aorta, aortic arch and descending aorta   are all structurally normal, with no evidence of dilitation or   obstruction.  Pulmonary Artery: The main pulmonary artery is normal in size.      Summary:   1. Left ventricular ejection fraction, by visual estimation, is 40 to   45%.   2. Mildly to moderately decreased global left ventricular systolic   function.   3. Severely enlarged left atrium.   4. Spectral Doppler shows restrictive pattern of left ventricular   myocardial filling (Grade III diastolic dysfunction).   5. Right atrial enlargement.   6. Moderate to severe mitral valve regurgitation.   7. Thickening of the anterior and posterior mitral valve leaflets.   8. Moderate tricuspid regurgitation.   9. Sclerotic aortic valve with normal opening.  10. Estimated pulmonary artery systolic pressure is 43.9 mmHg assuming a   right atrial pressure of 15 mmHg, which is consistent withmild pulmonary   hypertension.    [id number] Darnell Dubois MD,[facility] , Electronically signed on   2022 at 1:50:33 PM          < end of copied text >    < from: CT Angio Chest PE Protocol w/ IV Cont (22 @ 14:47) >    ACC: [id number] EXAM:  CT ANGIO CHEST PULM LIMA SINGH                          PROCEDURE DATE:  2022          INTERPRETATION:  CLINICAL INFORMATION: New onset atrial fibrillation.    COMPARISON: None.    CONTRAST/COMPLICATIONS:  IV Contrast: Omnipaque 350  85 cc administered   15 cc discarded  Oral Contrast: NONE  Complications: None reported at time of study completion    PROCEDURE:  CT Angiography of the Chest.  Sagittal and coronal reformats were performed as well as 3D (MIP)   reconstructions.    FINDINGS:    LUNGS AND AIRWAYS: Patent central airways.  Lungs are clear.  PLEURA: No pleural effusion. No pneumothorax or pneumomediastinum.  MEDIASTINUM AND KAILEE: No lymphadenopathy.  VESSELS: There is no evidence of filling defect in the first, second, or   third order branches of the pulmonary arteries. The pulmonary trunk and   main pulmonary arteries are unremarkable. The thoracic aorta and great   vessels are unremarkable.  HEART: The heart is enlarged. No pericardial effusion.  CHEST WALL AND LOWER NECK: Within normal limits.  VISUALIZED UPPER ABDOMEN: Small cyst in the caudate lobe of the liver.  BONES: Within normal limits.    IMPRESSION: No evidence of pulmonary embolism.    --- End of Report ---            ALE HANNON MD;Attending Radiologist  This document has been electronically signed. 2022  2:57PM    < end of copied text >

## 2022-04-15 NOTE — DISCHARGE NOTE PROVIDER - HOSPITAL COURSE
60 y/o F with PMH of HTN, asthma, and history of positive CAROLE (per chart review) presented to  ED on 4/13/22 from Family Practice for abnormal EKG findings. Patient was evaluated in [facility] on 4/11/22 for shortness of breath. In [facility] ED, EKG showed atrial fibrillation and patient was diagnosed with viral URI and was sent home and advised to follow-up with family practice on 4/13/22. In the clinic, repeat EKG showed atrial fibrillation, so she was sent to the ED for further management. During this admission she was evaluated by cardiology. She had an ECHO done which showed LV dysfunction and an EF of 40 to 45% and mild pulmonary HTN. She was started on metoprolol 12.5 mg BID and Eliquis 5 mg BID. Upon discharge, she was given a dose of warfarin 5 mg. Her INR was 1.29 prior to discharge. She will continue to take 5 mg of warfarin daily beginning on 4/16/22 until her appointment on Monday 4/18/22 at 8:30 AM with [facility]. She will follow-up with cardiology for possible plans of cardioversion on May 6 at 1:30 PM at the cardiology clinic in [facility]. Prior to discharge, the primary team had an at length discussion with the patient and her 2 daughters regarding the close follow-up she will need especially given the frequent INR checks that will be required.     Vital Signs Last 24 Hrs  T(C): 37 (15 Apr 2022 12:17), Max: 37 (15 Apr 2022 12:17)  T(F): 98.6 (15 Apr 2022 12:17), Max: 98.6 (15 Apr 2022 12:17)  HR: 67 (15 Apr 2022 12:17) (52 - 81)  BP: 149/99 (15 Apr 2022 12:17) (138/94 - 159/65)  RR: 17 (15 Apr 2022 12:17) (14 - 17)  SpO2: 99% (15 Apr 2022 12:17) (96% - 100%)         60 y/o F with PMH of HTN, asthma, and history of positive CAROLE (per chart review) presented to  ED on 4/13/22 from Family Practice for abnormal EKG findings. Patient was evaluated in [facility] on 4/11/22 for shortness of breath. In [facility] ED, EKG showed atrial fibrillation and patient was diagnosed with viral URI and was sent home and advised to follow-up with family practice on 4/13/22. In the clinic, repeat EKG showed atrial fibrillation, so she was sent to the ED for further management. During this admission she was evaluated by cardiology. She had an ECHO done which showed LV dysfunction and an EF of 40 to 45% and mild pulmonary HTN. She was started on metoprolol 12.5 mg BID and Eliquis 5 mg BID. She was started on Losartan 25 mg as well for better blood pressure control. Her INR was 1.39 prior to discharge. She will continue to take 5 mg of warfarin daily beginning on 4/16/22 until her appointment on Monday 4/18/22 at 8:45 AM with [facility]. She will follow-up with cardiology regarding possible plans of cardioversion on May 6 at 1:30 PM at the cardiology clinic in [facility]. Prior to discharge, the primary team had an at length discussion with the patient and her 2 daughters regarding the close follow-up she will need especially given the frequent INR checks that will be required.     Vital Signs Last 24 Hrs  T(C): 36.6 (16 Apr 2022 08:43), Max: 36.8 (15 Apr 2022 17:05)  T(F): 97.8 (16 Apr 2022 08:43), Max: 98.2 (15 Apr 2022 17:05)  HR: 71 (16 Apr 2022 08:43) (69 - 88)  BP: 134/81 (16 Apr 2022 08:43) (127/83 - 176/117)  RR: 16 (16 Apr 2022 08:43) (16 - 17)  SpO2: 100% (16 Apr 2022 08:43) (99% - 100%)         60 y/o F with PMH of HTN, asthma, and history of positive CAROLE (per chart review) presented to  ED on 4/13/22 from Family Practice for abnormal EKG findings. Patient was evaluated in [facility] on 4/11/22 for shortness of breath. In [facility] ED, EKG showed atrial fibrillation and patient was diagnosed with viral URI and was sent home and advised to follow-up with family practice on 4/13/22. In the clinic, repeat EKG showed atrial fibrillation, so she was sent to the ED for further management. During this admission she was evaluated by cardiology. She had an ECHO done which showed LV dysfunction and an EF of 40 to 45% and mild pulmonary HTN. She was started on metoprolol 12.5 mg BID and Eliquis 5 mg BID. She was started on Losartan 25 mg as well for better blood pressure control. Her INR was 1.39 prior to discharge. She will continue to take 5 mg of warfarin daily beginning on 4/16/22 until her appointment on Monday 4/18/22 at 8:45 AM with [facility]. She will follow-up with cardiology regarding possible plans of cardioversion on May 6 at 1:30 PM at the cardiology clinic in [facility]. Prior to discharge, the primary team had an at length discussion with the patient and her 2 daughters regarding the close follow-up she will need especially given the frequent INR checks that will be required.     She will go home on the following medications:   metoprolol succinate 25 mg daily   losartan 25 mg daily   warfarin 5 mg daily     Vital Signs Last 24 Hrs  T(C): 36.6 (16 Apr 2022 08:43), Max: 36.8 (15 Apr 2022 17:05)  T(F): 97.8 (16 Apr 2022 08:43), Max: 98.2 (15 Apr 2022 17:05)  HR: 71 (16 Apr 2022 08:43) (69 - 88)  BP: 134/81 (16 Apr 2022 08:43) (127/83 - 176/117)  RR: 16 (16 Apr 2022 08:43) (16 - 17)  SpO2: 100% (16 Apr 2022 08:43) (99% - 100%)

## 2022-04-15 NOTE — DISCHARGE NOTE PROVIDER - NSDCMRMEDTOKEN_GEN_ALL_CORE_FT
metoprolol: 12.5 milligram(s) orally 2 times a day  warfarin 5 mg oral tablet: 1 tab(s) orally once a day (at bedtime)    losartan 25 mg oral tablet: 1 tab(s) orally once a day   metoprolol: 12.5 milligram(s) orally 2 times a day  warfarin 5 mg oral tablet: 1 tab(s) orally once a day (at bedtime)    losartan 25 mg oral tablet: 1 tab(s) orally once a day   metoprolol succinate 25 mg oral capsule, extended release: 1 cap(s) orally once a day   warfarin 5 mg oral tablet: 1 tab(s) orally once a day (at bedtime)

## 2022-04-15 NOTE — DISCHARGE NOTE PROVIDER - CARE PROVIDER_API CALL
MARC RETANA, [facility]  Phone: [phone]  Fax: [phone]  Established Patient  Scheduled Appointment: 04/18/2022 08:30 AM

## 2022-04-15 NOTE — DISCHARGE NOTE PROVIDER - NSDCCPCAREPLAN_GEN_ALL_CORE_FT
PRINCIPAL DISCHARGE DIAGNOSIS  Diagnosis: New onset atrial fibrillation  Assessment and Plan of Treatment: You were found to have an abnormal heart rhythm known as Atrial Fibrillation. This heart rhythm puts you at risk of having a stroke as your blood is more prone to clotting. In order to maintain this condition you will need to be on a medication to control your heart rate known as metoprolol and a blood thinner known as warfarin. The warfarin will require you to have frequent blood work done in the clinic as the dose will need to be adjusted based off a lab value known as the INR. Your target INR level is between 2 and 3.   Please follow-up on 4/18/22 at 8:30 AM to have your INR level checked at [facility]. Your medications were sent to [facility]. Start taking warfarin 5 mg at bedtime daily on 4/16/22 (Saturday).   You have a follow-up appointment with cardiology clinic on May 6, 2022 at 1:30 PM.      SECONDARY DISCHARGE DIAGNOSES  Diagnosis: Uncontrolled hypertension  Assessment and Plan of Treatment: Please continue to take metroprolol 12.5 mg twice daily. Please discuss with your PCP as you might need to start a second blood pressure medication.     PRINCIPAL DISCHARGE DIAGNOSIS  Diagnosis: New onset atrial fibrillation  Assessment and Plan of Treatment: You were found to have an abnormal heart rhythm known as Atrial Fibrillation. This heart rhythm puts you at risk of having a stroke as your blood is more prone to clotting. In order to maintain this condition you will need to be on a medication to control your heart rate known as metoprolol and a blood thinner known as warfarin. The warfarin will require you to have frequent blood work done in the clinic as the dose will need to be adjusted based off a lab value known as the INR. Your target INR level is between 2 and 3.   Please follow-up on 4/18/22 at 8:45 AM to have your INR level checked at [facility]. Your medications were sent to [facility]. Start taking warfarin 5 mg at bedtime daily on 4/16/22 (Saturday).   You have a follow-up appointment with cardiology clinic on May 6, 2022 at 1:30 PM.      SECONDARY DISCHARGE DIAGNOSES  Diagnosis: Uncontrolled hypertension  Assessment and Plan of Treatment: Please continue to take metroprolol 12.5 mg twice daily.   Please continue to take losartan 25 mg daily.

## 2022-04-16 VITALS — SYSTOLIC BLOOD PRESSURE: 150 MMHG | DIASTOLIC BLOOD PRESSURE: 102 MMHG | HEART RATE: 83 BPM

## 2022-04-16 LAB
ANION GAP SERPL CALC-SCNC: 9 MMOL/L — SIGNIFICANT CHANGE UP (ref 5–17)
BUN SERPL-MCNC: 8 MG/DL — SIGNIFICANT CHANGE UP (ref 7–23)
CALCIUM SERPL-MCNC: 8.5 MG/DL — SIGNIFICANT CHANGE UP (ref 8.4–10.5)
CHLORIDE SERPL-SCNC: 105 MMOL/L — SIGNIFICANT CHANGE UP (ref 96–108)
CHOLEST SERPL-MCNC: 176 MG/DL — SIGNIFICANT CHANGE UP
CO2 SERPL-SCNC: 27 MMOL/L — SIGNIFICANT CHANGE UP (ref 22–31)
CREAT SERPL-MCNC: 0.82 MG/DL — SIGNIFICANT CHANGE UP (ref 0.5–1.3)
EGFR: 82 ML/MIN/1.73M2 — SIGNIFICANT CHANGE UP
GLUCOSE SERPL-MCNC: 95 MG/DL — SIGNIFICANT CHANGE UP (ref 70–99)
HDLC SERPL-MCNC: 48 MG/DL — LOW
INR BLD: 1.39 RATIO — HIGH (ref 0.88–1.16)
LIPID PNL WITH DIRECT LDL SERPL: 118 MG/DL — HIGH
NON HDL CHOLESTEROL: 128 MG/DL — SIGNIFICANT CHANGE UP
POTASSIUM SERPL-MCNC: 3.6 MMOL/L — SIGNIFICANT CHANGE UP (ref 3.5–5.3)
POTASSIUM SERPL-SCNC: 3.6 MMOL/L — SIGNIFICANT CHANGE UP (ref 3.5–5.3)
PROTHROM AB SERPL-ACNC: 16.2 SEC — HIGH (ref 10.5–13.4)
SODIUM SERPL-SCNC: 141 MMOL/L — SIGNIFICANT CHANGE UP (ref 135–145)
TRIGL SERPL-MCNC: 48 MG/DL — SIGNIFICANT CHANGE UP

## 2022-04-16 PROCEDURE — 71275 CT ANGIOGRAPHY CHEST: CPT

## 2022-04-16 PROCEDURE — 80061 LIPID PANEL: CPT

## 2022-04-16 PROCEDURE — 85610 PROTHROMBIN TIME: CPT

## 2022-04-16 PROCEDURE — 80048 BASIC METABOLIC PNL TOTAL CA: CPT

## 2022-04-16 PROCEDURE — 87635 SARS-COV-2 COVID-19 AMP PRB: CPT

## 2022-04-16 PROCEDURE — 93005 ELECTROCARDIOGRAM TRACING: CPT

## 2022-04-16 PROCEDURE — 84484 ASSAY OF TROPONIN QUANT: CPT

## 2022-04-16 PROCEDURE — 99232 SBSQ HOSP IP/OBS MODERATE 35: CPT

## 2022-04-16 PROCEDURE — 36415 COLL VENOUS BLD VENIPUNCTURE: CPT

## 2022-04-16 PROCEDURE — 83036 HEMOGLOBIN GLYCOSYLATED A1C: CPT

## 2022-04-16 PROCEDURE — 83880 ASSAY OF NATRIURETIC PEPTIDE: CPT

## 2022-04-16 PROCEDURE — 85025 COMPLETE CBC W/AUTO DIFF WBC: CPT

## 2022-04-16 PROCEDURE — 83735 ASSAY OF MAGNESIUM: CPT

## 2022-04-16 PROCEDURE — 93970 EXTREMITY STUDY: CPT

## 2022-04-16 PROCEDURE — 80053 COMPREHEN METABOLIC PANEL: CPT

## 2022-04-16 PROCEDURE — 80074 ACUTE HEPATITIS PANEL: CPT

## 2022-04-16 PROCEDURE — 84100 ASSAY OF PHOSPHORUS: CPT

## 2022-04-16 PROCEDURE — 85379 FIBRIN DEGRADATION QUANT: CPT

## 2022-04-16 PROCEDURE — 85730 THROMBOPLASTIN TIME PARTIAL: CPT

## 2022-04-16 PROCEDURE — 99285 EMERGENCY DEPT VISIT HI MDM: CPT

## 2022-04-16 PROCEDURE — 93306 TTE W/DOPPLER COMPLETE: CPT

## 2022-04-16 PROCEDURE — 36000 PLACE NEEDLE IN VEIN: CPT

## 2022-04-16 PROCEDURE — 84443 ASSAY THYROID STIM HORMONE: CPT

## 2022-04-16 RX ORDER — LOSARTAN POTASSIUM 100 MG/1
1 TABLET, FILM COATED ORAL
Qty: 30 | Refills: 0
Start: 2022-04-16 | End: 2022-05-15

## 2022-04-16 RX ORDER — METOPROLOL TARTRATE 50 MG
1 TABLET ORAL
Qty: 30 | Refills: 0
Start: 2022-04-16 | End: 2022-05-15

## 2022-04-16 RX ORDER — LOSARTAN POTASSIUM 100 MG/1
1 TABLET, FILM COATED ORAL
Qty: 0 | Refills: 0 | DISCHARGE
Start: 2022-04-16

## 2022-04-16 RX ADMIN — Medication 12.5 MILLIGRAM(S): at 17:48

## 2022-04-16 RX ADMIN — Medication 12.5 MILLIGRAM(S): at 05:39

## 2022-04-16 RX ADMIN — APIXABAN 5 MILLIGRAM(S): 2.5 TABLET, FILM COATED ORAL at 05:39

## 2022-04-16 RX ADMIN — LOSARTAN POTASSIUM 25 MILLIGRAM(S): 100 TABLET, FILM COATED ORAL at 05:39

## 2022-04-16 NOTE — CHART NOTE - NSCHARTNOTEFT_GEN_A_CORE
To whom it may concern,     This letter is to certify that patient ROSANGELA CHAMPION D.O.B. 60 was admitted to [facility] from 22 to 22. Please excuse her for her absence. If you have any questions or concerns please call [phone].     Sincerely,     Bucky Whitten MD

## 2022-04-16 NOTE — PROGRESS NOTE ADULT - ASSESSMENT
61 year old woman admitted for new atrial fibrillation.  +HTN  Echo with mild-moderate LV dysfunction    Plan  - continue losartan  - change metoprolol tartrate to metoprolol succinate 25 mg daily  - oral anticoagulation   - eventual ischemia and EP evaluation     discussed with patient and with FP resident     
62 y/o F with PMH of HTN and asthma with newly diagnosed Afib.     #New Onset Afib   -[id number] Score: 3 (Female, HTN, heart failure)   -Continue Metoprolol Tartrate 12.5 mg PO BID  -Continue Eliquis 5 mg BID  -ECHO with EF of 40 to 45%, grade III diastolic dysfunction     #Elevated Troponin   -likely secondary to demand ischemia in setting of new onset afib   - Now resolved     #RLE Pedal Edema   - now resolved  - duplex US - no evidence of DVT in either lower extremity     #HTN  - Continue Metoprolol Tartrate 12.5 mg BID, titrate PRN   - Continue Losartan 25 mg daily     #Asthma  -Albuterol PRN     #Hx of (+)CAROLE  -?SLE  -outpt f/u     #DVT Prophylaxis   -on Eliquis   - on warfarin    #DIET  -DASH/TLC
62 y/o F with PMH of HTN and asthma with newly diagnosed Afib.     #New Onset Afib   -[id number] Score: 3 (Female, HTN, heart failure)   -Continue Metoprolol Tartrate 12.5 mg PO BID  -Continue Eliquis 5 mg BID  -ECHO with EF of 40 to 45%, grade III diastolic dysfunction     #Elevated Troponin   -likely secondary to demand ischemia in setting of new onset afib   - Now resolved     #RLE Pedal Edema   - now resolved  - duplex US - no evidence of DVT in either lower extremity     #HTN  - Continue Metoprolol Tartrate 12.5 mg BID, titrate PRN   - Continue Losartan 25 mg daily     #Asthma  -Albuterol PRN     #Hx of (+)CAROLE  -?SLE  -outpt f/u     #DVT Prophylaxis   -on Eliquis   - on warfarin    #DIET  -DASH/TLC
60 y/o F with PMH of HTN and asthma with newly diagnosed Afib.     #New Onset Afib   -[id number] Score: 2 (Female, HTN)   -Continue Metoprolol Tartrate 12.5 mg PO BID  -Continue Eliquis 5 mg BID  -ECHO with EF of 40 to 45%    #Elevated Troponin   -likely secondary to demand ischemia in setting of new onset afib   - Now resolved     #RLE Pedal Edema   - duplex US - no evidence of DVT in either lower extremity     #HTN  - Continue Metoprolol Tartrate 12.5 mg BID, titrate PRN     #Asthma  -Albuterol PRN     #Hx of (+)CAROLE  -?SLE  -outpt f/u     #DVT Prophylaxis   -on Eliquis     #DIET  -DASH/TLC
AF  Cardiomyopathy  HBP        Suggest  oral anticoagulation  Long activing b blocker for lv dysfunctin and rate control  add ARB for BP control lv dysfunction    eventual ischemia work up  discussed EP referral for Cardioversion and/or ablation  avoid etoh    d/w patient and daughter by phone, also dr benavides and chhaya

## 2022-04-16 NOTE — PROGRESS NOTE ADULT - SUBJECTIVE AND OBJECTIVE BOX
S:  60 y/o F with PMH of HTN, asthma, and history of positive CAROLE (per chart review) presented to  ED on 4/13/22 from Family Practice for abnormal EKG findings. Patient was evaluated in [facility] on 4/11/22 for shortness of breath. In [facility] ED, EKG showed atrial fibrillation and patient was diagnosed with viral URI and was sent home and advised to follow-up with family practice on 4/13/22. In the clinic, repeat EKG showed atrial fibrillation, so she was sent to the ED for further management.     4/14: Today is hospital day 1. Overnight patient became bradycardic so her metoprolol was held. This morning patient's HR is well controlled.     4/15: Today is hospital day 2. Plan for discharge today cancelled as patient's blood pressure was too high. Will start on an ARB.     4/16: Today is hospital day 3. Patient's blood pressure better controlled. Will discharge today.     O:  Vital Signs Last 24 Hrs  T(C): 36.6 (16 Apr 2022 08:43), Max: 36.8 (15 Apr 2022 17:05)  T(F): 97.8 (16 Apr 2022 08:43), Max: 98.2 (15 Apr 2022 17:05)  HR: 71 (16 Apr 2022 08:43) (69 - 88)  BP: 134/81 (16 Apr 2022 08:43) (127/83 - 176/117)  RR: 16 (16 Apr 2022 08:43) (16 - 17)  SpO2: 100% (16 Apr 2022 08:43) (99% - 100%)    Physical Exam:  Gen: NAD, comfortable, AA&Ox4  CVS: +s1, s2, (+)irregularly irregular, no murmurs, rubs or gallops  Pulmonary: normal respiratory effort, clear to auscultation b/l, no wheezes/crackles/rhonchi  Abdomen: soft, non-tender, non-distended, +bowel sounds in all 4 quadrants, no masses noted, no guarding or rigidity   Skin: nl warm and dry, no wounds   Neuro: answering questions appropriately      LABS:  cret    04-16    141  |  105  |  8   ----------------------------<  95  3.6   |  27  |  0.82    Ca    8.5      16 Apr 2022 06:20  Phos  4.4     04-15  Mg     1.8     04-15      PT/INR - ( 16 Apr 2022 06:20 )   PT: 16.2 sec;   INR: 1.39 ratio         PTT - ( 15 Apr 2022 11:18 )  PTT:34.4 sec      IMAGING:  < from: US Duplex Venous Lower Ext Complete, Bilateral (04.13.22 @ 19:41) >  IMPRESSION:  No evidence of deep venous thrombosis in either lower extremity.    < end of copied text >  < from: CT Angio Chest PE Protocol w/ IV Cont (04.14.22 @ 14:47) >  IMPRESSION: No evidence of pulmonary embolism.    < end of copied text >  < from: TTE Echo Complete w/o Contrast w/ Doppler (04.14.22 @ 08:55) >  Summary:   1. Left ventricular ejection fraction, by visual estimation, is 40 to   45%.   2. Mildly to moderately decreased global left ventricular systolic   function.   3. Severely enlarged left atrium.   4. Spectral Doppler shows restrictive pattern of left ventricular   myocardial filling (Grade III diastolic dysfunction).   5. Right atrial enlargement.   6. Moderate to severe mitral valve regurgitation.   7. Thickening of the anterior and posterior mitral valve leaflets.   8. Moderate tricuspid regurgitation.   9. Sclerotic aortic valve with normal opening.  10. Estimated pulmonary artery systolic pressure is 43.9 mmHg assuming a   right atrial pressure of 15 mmHg, which is consistent withmild pulmonary   hypertension.    [id number] Darnell Dubois MD,[facility] , Electronically signed on   4/14/2022 at 1:50:33 PM    < end of copied text >    MEDICATIONS  (STANDING):  apixaban 5 milliGRAM(s) Oral every 12 hours  losartan 25 milliGRAM(s) Oral daily  metoprolol tartrate 12.5 milliGRAM(s) Oral two times a day    MEDICATIONS  (PRN):  acetaminophen     Tablet .. 650 milliGRAM(s) Oral every 6 hours PRN Temp greater or equal to 38C (100.4F), Mild Pain (1 - 3)  melatonin 3 milliGRAM(s) Oral at bedtime PRN Insomnia  ondansetron Injectable 4 milliGRAM(s) IV Push every 8 hours PRN Nausea and/or Vomiting

## 2022-04-16 NOTE — CHART NOTE - NSCHARTNOTEFT_GEN_A_CORE
Brief nutrition Note: Vitamin K and Warfarin (Coumadin) Nutrition Therapy education and handout was provided to patient. Reviewed dietary modifications including; Identifying Vitamin K content in foods, Consistent daily intake of Vitamin K foods, Avoiding large changes in amounts of Vitamin K consumption from day to day, and awareness Vitamin K supplementation. Education was well received by patient as demonstrated by teach back.     Dietetic Technician  Maria Isabel Devi

## 2022-04-16 NOTE — PROGRESS NOTE ADULT - SUBJECTIVE AND OBJECTIVE BOX
`SUBJ:  Patient is a 61y old  Female who presents with a chief complaint of new onset afib (16 Apr 2022 12:36)  patient seen and examined  chart is reviewed  case discussed with Dr Dubois  patient in bed... no complaints       PAST MEDICAL & SURGICAL HISTORY:  COVID-19 vaccine series completed  W #2 boosters    HTN (hypertension)    Asthma    No significant past surgical history        MEDICATIONS  (STANDING):  apixaban 5 milliGRAM(s) Oral every 12 hours  losartan 25 milliGRAM(s) Oral daily  metoprolol tartrate 12.5 milliGRAM(s) Oral two times a day    MEDICATIONS  (PRN):  acetaminophen     Tablet .. 650 milliGRAM(s) Oral every 6 hours PRN Temp greater or equal to 38C (100.4F), Mild Pain (1 - 3)  melatonin 3 milliGRAM(s) Oral at bedtime PRN Insomnia  ondansetron Injectable 4 milliGRAM(s) IV Push every 8 hours PRN Nausea and/or Vomiting          Vital Signs Last 24 Hrs  T(C): 36.9 (16 Apr 2022 13:37), Max: 36.9 (16 Apr 2022 13:37)  T(F): 98.5 (16 Apr 2022 13:37), Max: 98.5 (16 Apr 2022 13:37)  HR: 79 (16 Apr 2022 13:37) (69 - 88)  BP: 116/81 (16 Apr 2022 13:37) (116/81 - 176/117)  BP(mean): --  RR: 16 (16 Apr 2022 13:37) (16 - 17)  SpO2: 100% (16 Apr 2022 13:37) (99% - 100%)    REVIEW OF SYSTEMS:  CONSTITUTIONAL: No fever, weight loss, or fatigue  RESPIRATORY: No cough, wheezing, chills or hemoptysis; No shortness of breath  CARDIOVASCULAR: No chest pain or chest pressure.  No shortness of breath or dyspnea on exertion.  No palpitations, dizziness, light headedness, syncope or near syncope.  No edema, no orthopnea.   NEUROLOGICAL: No headaches, memory loss, loss of strength, numbness, or tremors      PHYSICAL EXAM  Constitutional:  WDWN. No acute distress  HEENT: normocephalic, atraumatic.  PERRLA. EOMI  Neck : No JVD. no carotid bruits  Lungs:  clear to auscultation bilaterally. no rhonchi. no wheezing  Heart:  irregular S1 and S2. No S3, S4. II/VI systolic murmur.  Abdomen:  soft, non tender.  Extremities: No clubbing, cyanoisis or edema  Nuerologic:  A+O x 3. No focal deficits  Skin:  no rashes        LABS:    04-16    141  |  105  |  8   ----------------------------<  95  3.6   |  27  |  0.82    Ca    8.5      16 Apr 2022 06:20  Phos  4.4     04-15  Mg     1.8     04-15    < from: TTE Echo Complete w/o Contrast w/ Doppler (04.14.22 @ 08:55) >   1. Left ventricular ejection fraction, by visual estimation, is 40 to   45%.   2. Mildly to moderately decreased global left ventricular systolic   function.   3. Severely enlarged left atrium.   4. Spectral Doppler shows restrictive pattern of left ventricular   myocardial filling (Grade III diastolic dysfunction).   5. Right atrial enlargement.   6. Moderate to severe mitral valve regurgitation.   7. Thickening of the anterior and posterior mitral valve leaflets.   8. Moderate tricuspid regurgitation.   9. Sclerotic aortic valve with normal opening.  10. Estimated pulmonary artery systolic pressure is 43.9 mmHg assuming a   right atrial pressure of 15 mmHg, which is consistent withmild pulmonary   hypertension.    < end of copied text >  < from: 12 Lead ECG (04.14.22 @ 03:08) >  Diagnosis Line Atrial fibrillation  Cannot rule out Anterior infarct , age undetermined  Abnormal ECG  When compared with ECG of 13-APR-2022 16:49,  No significant change was found    < end of copied text >

## 2022-04-18 ENCOUNTER — APPOINTMENT (OUTPATIENT)
Dept: FAMILY MEDICINE | Facility: HOSPITAL | Age: 62
End: 2022-04-18

## 2022-04-18 ENCOUNTER — OUTPATIENT (OUTPATIENT)
Dept: OUTPATIENT SERVICES | Facility: HOSPITAL | Age: 62
LOS: 1 days | End: 2022-04-18
Payer: SELF-PAY

## 2022-04-18 ENCOUNTER — RESULT CHARGE (OUTPATIENT)
Age: 62
End: 2022-04-18

## 2022-04-18 VITALS
BODY MASS INDEX: 26.46 KG/M2 | DIASTOLIC BLOOD PRESSURE: 97 MMHG | TEMPERATURE: 97.3 F | WEIGHT: 159 LBS | RESPIRATION RATE: 17 BRPM | OXYGEN SATURATION: 100 % | HEART RATE: 76 BPM | SYSTOLIC BLOOD PRESSURE: 144 MMHG

## 2022-04-18 DIAGNOSIS — Z00.00 ENCOUNTER FOR GENERAL ADULT MEDICAL EXAMINATION WITHOUT ABNORMAL FINDINGS: ICD-10-CM

## 2022-04-18 DIAGNOSIS — Z86.79 PERSONAL HISTORY OF OTHER DISEASES OF THE CIRCULATORY SYSTEM: ICD-10-CM

## 2022-04-18 LAB
INR PPP: 1 RATIO
POCT-PROTHROMBIN TIME: 12.3 SECS
QUALITY CONTROL: YES

## 2022-04-18 PROCEDURE — G0463: CPT

## 2022-04-18 RX ORDER — METOPROLOL TARTRATE 25 MG/1
25 TABLET, FILM COATED ORAL
Qty: 60 | Refills: 1 | Status: DISCONTINUED | COMMUNITY
Start: 2022-04-15 | End: 2022-04-18

## 2022-04-19 NOTE — HISTORY OF PRESENT ILLNESS
[Post-hospitalization from ___ Hospital] : Post-hospitalization from [unfilled] Hospital [Admitted on: ___] : The patient was admitted on [unfilled] [Discharged on ___] : discharged on [unfilled] [Discharge Summary] : discharge summary [Pertinent Labs] : pertinent labs [Radiology Findings] : radiology findings [Discharge Med List] : discharge medication list [Med Reconciliation] : medication reconciliation has been completed [FreeTextEntry2] : Pt is a 60 yo F w PMH of HTN,  Asthma & newly diagnosed Afib presents for Post D/C F/U. Pt was sent from [facility] on 04/13 due to being found with an irregular HR. Upon admission, was found to be in Afib, started in Metoprolol tartrate 12.5 mg BID, which was transitioned to Metoprolol Succinate 25 mg upon D/C. Pt was started on Eliquis for AC (CHADS-VASC score 2). Echo obtained demonstrated LV dysfunction, EF 40-45%, mild pulm HTN. Pt was also startes on Losartan 25 mg for BP control, D/C on this dose. Upon D/C, pt was switched from Eliquis to Warfarin, 5 mg started on 04/16. INR upon D/C was 1.39. Pt was advised to F/U w Cardiology for discussion of possible cardioversion and further ischemic work-up. Has scheduled F/U in Cardiology Clinic on May 06, 2022. \par -Today, pt denies any complaints. Denies chest pain, SOB or LE edema. Pt w 2 family members present, states has been difficult dealing w new diagnosis. POCT INR 1 today in office.

## 2022-04-21 DIAGNOSIS — I48.91 UNSPECIFIED ATRIAL FIBRILLATION: ICD-10-CM

## 2022-04-21 DIAGNOSIS — Z09 ENCOUNTER FOR FOLLOW-UP EXAMINATION AFTER COMPLETED TREATMENT FOR CONDITIONS OTHER THAN MALIGNANT NEOPLASM: ICD-10-CM

## 2022-04-22 ENCOUNTER — RESULT CHARGE (OUTPATIENT)
Age: 62
End: 2022-04-22

## 2022-04-22 ENCOUNTER — APPOINTMENT (OUTPATIENT)
Dept: FAMILY MEDICINE | Facility: HOSPITAL | Age: 62
End: 2022-04-22

## 2022-04-22 ENCOUNTER — OUTPATIENT (OUTPATIENT)
Dept: OUTPATIENT SERVICES | Facility: HOSPITAL | Age: 62
LOS: 1 days | End: 2022-04-22
Payer: SELF-PAY

## 2022-04-22 VITALS
DIASTOLIC BLOOD PRESSURE: 74 MMHG | RESPIRATION RATE: 15 BRPM | TEMPERATURE: 97.6 F | OXYGEN SATURATION: 100 % | BODY MASS INDEX: 26.63 KG/M2 | HEART RATE: 63 BPM | WEIGHT: 160 LBS | SYSTOLIC BLOOD PRESSURE: 121 MMHG

## 2022-04-22 DIAGNOSIS — Z09 ENCOUNTER FOR FOLLOW-UP EXAMINATION AFTER COMPLETED TREATMENT FOR CONDITIONS OTHER THAN MALIGNANT NEOPLASM: ICD-10-CM

## 2022-04-22 DIAGNOSIS — Z00.00 ENCOUNTER FOR GENERAL ADULT MEDICAL EXAMINATION W/OUT ABNORMAL FINDINGS: ICD-10-CM

## 2022-04-22 DIAGNOSIS — R03.0 ELEVATED BLOOD-PRESSURE READING, W/OUT DIAGNOSIS OF HYPERTENSION: ICD-10-CM

## 2022-04-22 DIAGNOSIS — Z20.89 CONTACT WITH AND (SUSPECTED) EXPOSURE TO OTHER COMMUNICABLE DISEASES: ICD-10-CM

## 2022-04-22 DIAGNOSIS — Z00.00 ENCOUNTER FOR GENERAL ADULT MEDICAL EXAMINATION WITHOUT ABNORMAL FINDINGS: ICD-10-CM

## 2022-04-22 PROCEDURE — G0463: CPT

## 2022-04-23 LAB
INR PPP: 1.4 RATIO
POCT-PROTHROMBIN TIME: 16.3 SECS
QUALITY CONTROL: YES

## 2022-04-27 DIAGNOSIS — I48.91 UNSPECIFIED ATRIAL FIBRILLATION: ICD-10-CM

## 2022-04-27 DIAGNOSIS — R79.1 ABNORMAL COAGULATION PROFILE: ICD-10-CM

## 2022-04-27 DIAGNOSIS — J45.902 UNSPECIFIED ASTHMA WITH STATUS ASTHMATICUS: ICD-10-CM

## 2022-04-27 DIAGNOSIS — I10 ESSENTIAL (PRIMARY) HYPERTENSION: ICD-10-CM

## 2022-04-27 NOTE — ED ADULT TRIAGE NOTE - TEMPERATURE IN FAHRENHEIT (DEGREES F)
98.4 Itraconazole Counseling:  I discussed with the patient the risks of itraconazole including but not limited to liver damage, nausea/vomiting, neuropathy, and severe allergy.  The patient understands that this medication is best absorbed when taken with acidic beverages such as non-diet cola or ginger ale.  The patient understands that monitoring is required including baseline LFTs and repeat LFTs at intervals.  The patient understands that they are to contact us or the primary physician if concerning signs are noted.

## 2022-04-27 NOTE — HEALTH RISK ASSESSMENT
[Fair] :  ~his/her~ mood as fair [Former] : Former [No] : In the past 12 months have you used drugs other than those required for medical reasons? No [0] : 2) Feeling down, depressed, or hopeless: Not at all (0) [PHQ-2 Negative - No further assessment needed] : PHQ-2 Negative - No further assessment needed [None] : None [[id number]] : 0 [Change in mental status noted] : No change in mental status noted [FreeTextEntry2] : grocery store

## 2022-04-27 NOTE — PLAN
[FreeTextEntry1] : #HCM\par - CBC, BMP, lipids, A1C reviewed\par - Weight: goal BMI <25. \par - Physical Activity: Advised pt 150 min/wk aerobic activity recommended\par - Medical Nutritional Therapy: Mediterranean diet recommended. \par - UTD on Immunization\par - LAst pap> 10 years ago. will returnk for PAP/CSP\par \par # Newly diagnosed afib\par - today subtherapeutic at  1.4. ( target 2-3)\par - INstructed patient to take 5 mg warfarin along with newly prescribed 1 mg warfarin for a new total of 6 mg daily  - Continue Metoprolol succinate 25 mg qD, Losartan 25 mg qD\par - has F/U w Cardiology on May 06 to discuss possible ischemic work-up and cardioversion\par \par Subtherapeutic international normalized ratio (INR) (790.92) (R79.1)\par  · -INR 1.4  today in office\par    -  INstructed patient to take 5 mg warfarin along with newly prescribed 1 mg warfarin for a new total of 6 mg daily\par     -Advised to F/U next week for INR check\par \par # HTN \par  · -BP WNL in office 121/74\par     - continue losartan 25 mg\par     -denies chest pain, SOB or CISNEROS\par \par #Asthma - stable\par - continue albuterol as needed\par \par rtc in 1 week for INR check/Pap smear\par \par *Above discussed w Dr. Rausch\par \par

## 2022-04-27 NOTE — HISTORY OF PRESENT ILLNESS
[FreeTextEntry1] : CPE/ INR check [de-identified] : Pt is a 60 yo F w PMH of HTN, Asthma & newly diagnosed Afib presents for CPE and INR check. Per previous note, " Pt was sent from [facility] on 04/13 due to being found with an irregular HR. Upon admission, was found to be in Afib, started in Metoprolol tartrate 12.5 mg BID, which was transitioned to Metoprolol Succinate 25 mg upon D/C. Pt was started on Eliquis for AC (CHADS-VASC score 2). Echo obtained demonstrated LV dysfunction, EF 40-45%, mild pulm HTN. Pt was also started on Losartan 25 mg for BP control, D/C on this dose. Upon D/C, pt was switched from Eliquis to Warfarin, 5 mg started on 04/16. INR upon D/C was 1.39. Pt was advised to F/U w Cardiology for discussion of possible cardioversion and further ischemic work-up. Has scheduled F/U in Cardiology Clinic on May 06, 2022" .\par \par Last seen on \par -Today, pt denies any complaints. Denies chest pain, SOB or LE edema. Pt w 2 family members present, states has been difficult dealing w new diagnosis. POCT INR 1 today in office. \par  \par

## 2022-04-29 ENCOUNTER — OUTPATIENT (OUTPATIENT)
Dept: OUTPATIENT SERVICES | Facility: HOSPITAL | Age: 62
LOS: 1 days | End: 2022-04-29
Payer: SELF-PAY

## 2022-04-29 ENCOUNTER — APPOINTMENT (OUTPATIENT)
Dept: FAMILY MEDICINE | Facility: HOSPITAL | Age: 62
End: 2022-04-29

## 2022-04-29 ENCOUNTER — RESULT CHARGE (OUTPATIENT)
Age: 62
End: 2022-04-29

## 2022-04-29 VITALS
HEART RATE: 80 BPM | DIASTOLIC BLOOD PRESSURE: 84 MMHG | HEIGHT: 65 IN | TEMPERATURE: 97.6 F | OXYGEN SATURATION: 99 % | WEIGHT: 160 LBS | BODY MASS INDEX: 26.66 KG/M2 | RESPIRATION RATE: 15 BRPM | SYSTOLIC BLOOD PRESSURE: 153 MMHG

## 2022-04-29 DIAGNOSIS — Z00.00 ENCOUNTER FOR GENERAL ADULT MEDICAL EXAMINATION WITHOUT ABNORMAL FINDINGS: ICD-10-CM

## 2022-04-29 PROCEDURE — 87624 HPV HI-RISK TYP POOLED RSLT: CPT

## 2022-04-29 PROCEDURE — G0463: CPT

## 2022-04-29 NOTE — PLAN
[FreeTextEntry1] : 62 yo F w PMH of HTN, Asthma & newly diagnosed A-fib on Coumadin came to the clinic for an INR check and Pap smear.\par \par #Afib on Coumadin/subtherapeutic INR \par - Pt on warfarin 6 mg PO daily \par - INR 1.5 \par - INR target 2-3 \par - Pt was instructed to increase warfarin to 7 mg PO daily and to repeat INR in one week \par - Continue Metoprolol succinate 25 mg qD\par - Has f/u with Cardiology on May 6, 2022 to discuss possible ischemic work-up and cardioversion\par - Repeat INR in one week \par \par #Cervical cancer screening \par - Pap smear with high risk HPV performed today \par \par #Preventive measures \par - Pt will need a CSP for mammogram and FIT testing \par - Pt also will nee pneumo 23 vaccine, offer in the  next visit \par \par RTC in 1 week for INR check and pap smear \par \par Case discussed with Dr Rausch

## 2022-04-29 NOTE — HISTORY OF PRESENT ILLNESS
[FreeTextEntry1] : INR check and pap smear  [de-identified] : 62 yo F w PMH of HTN, Asthma & newly diagnosed A-fib on Coumadin came to the clinic for an INR check and Pap smear.  Pt last time seen at clinic was on 4/22/22 for an INR check and it was 1.4. Pt was instructed to increase warfarin to 6 mg PO daily. Pt reports have been complaint with medication and denies any side effect. She reports is drinking juice with kale and spinach couple times weekly. She was informed about the importance to follow a warfarin diet with low intake of green vegetables. Pt reports feeling bad about diagnosis  but refused SW evaluation. She is not currently sexual active and last time had a pap smear was many years ago?. She denies SOB,chest pain, vaginal bleeding, nausea, vomits an any other symptoms.

## 2022-05-01 DIAGNOSIS — R79.1 ABNORMAL COAGULATION PROFILE: ICD-10-CM

## 2022-05-01 DIAGNOSIS — Z12.4 ENCOUNTER FOR SCREENING FOR MALIGNANT NEOPLASM OF CERVIX: ICD-10-CM

## 2022-05-02 LAB
HPV HIGH+LOW RISK DNA PNL CVX: NOT DETECTED
INR PPP: 1.5 RATIO
POCT-PROTHROMBIN TIME: 18.1 SECS

## 2022-05-06 ENCOUNTER — APPOINTMENT (OUTPATIENT)
Dept: CARDIOLOGY | Facility: HOSPITAL | Age: 62
End: 2022-05-06
Payer: COMMERCIAL

## 2022-05-06 ENCOUNTER — OUTPATIENT (OUTPATIENT)
Dept: OUTPATIENT SERVICES | Facility: HOSPITAL | Age: 62
LOS: 1 days | End: 2022-05-06
Payer: SELF-PAY

## 2022-05-06 VITALS
TEMPERATURE: 98.1 F | BODY MASS INDEX: 26.79 KG/M2 | OXYGEN SATURATION: 100 % | DIASTOLIC BLOOD PRESSURE: 98 MMHG | WEIGHT: 161 LBS | SYSTOLIC BLOOD PRESSURE: 145 MMHG | HEART RATE: 67 BPM | RESPIRATION RATE: 15 BRPM

## 2022-05-06 DIAGNOSIS — Z00.00 ENCOUNTER FOR GENERAL ADULT MEDICAL EXAMINATION WITHOUT ABNORMAL FINDINGS: ICD-10-CM

## 2022-05-06 PROCEDURE — G0463: CPT

## 2022-05-06 PROCEDURE — 93010 ELECTROCARDIOGRAM REPORT: CPT

## 2022-05-06 PROCEDURE — 93005 ELECTROCARDIOGRAM TRACING: CPT

## 2022-05-06 RX ORDER — LOSARTAN POTASSIUM 25 MG/1
25 TABLET, FILM COATED ORAL
Qty: 90 | Refills: 0 | Status: DISCONTINUED | COMMUNITY
Start: 2022-04-18 | End: 2022-05-06

## 2022-05-09 ENCOUNTER — NON-APPOINTMENT (OUTPATIENT)
Age: 62
End: 2022-05-09

## 2022-05-10 DIAGNOSIS — I10 ESSENTIAL (PRIMARY) HYPERTENSION: ICD-10-CM

## 2022-05-10 DIAGNOSIS — I48.91 UNSPECIFIED ATRIAL FIBRILLATION: ICD-10-CM

## 2022-05-13 ENCOUNTER — RESULT CHARGE (OUTPATIENT)
Age: 62
End: 2022-05-13

## 2022-05-13 ENCOUNTER — OUTPATIENT (OUTPATIENT)
Dept: OUTPATIENT SERVICES | Facility: HOSPITAL | Age: 62
LOS: 1 days | End: 2022-05-13
Payer: SELF-PAY

## 2022-05-13 ENCOUNTER — APPOINTMENT (OUTPATIENT)
Dept: FAMILY MEDICINE | Facility: HOSPITAL | Age: 62
End: 2022-05-13

## 2022-05-13 ENCOUNTER — OUTPATIENT (OUTPATIENT)
Dept: OUTPATIENT SERVICES | Facility: HOSPITAL | Age: 62
LOS: 1 days | End: 2022-05-13
Payer: COMMERCIAL

## 2022-05-13 VITALS
SYSTOLIC BLOOD PRESSURE: 145 MMHG | RESPIRATION RATE: 15 BRPM | HEIGHT: 65 IN | HEART RATE: 79 BPM | TEMPERATURE: 96.9 F | DIASTOLIC BLOOD PRESSURE: 81 MMHG | BODY MASS INDEX: 27.32 KG/M2 | OXYGEN SATURATION: 98 % | WEIGHT: 164 LBS

## 2022-05-13 DIAGNOSIS — Z00.00 ENCOUNTER FOR GENERAL ADULT MEDICAL EXAMINATION WITHOUT ABNORMAL FINDINGS: ICD-10-CM

## 2022-05-13 DIAGNOSIS — Z12.11 ENCOUNTER FOR SCREENING FOR MALIGNANT NEOPLASM OF COLON: ICD-10-CM

## 2022-05-13 PROCEDURE — G0463: CPT

## 2022-05-13 RX ORDER — WARFARIN 1 MG/1
1 TABLET ORAL
Qty: 30 | Refills: 0 | Status: DISCONTINUED | COMMUNITY
Start: 2022-04-22 | End: 2022-05-13

## 2022-05-13 RX ORDER — WARFARIN 5 MG/1
5 TABLET ORAL DAILY
Qty: 30 | Refills: 0 | Status: DISCONTINUED | COMMUNITY
Start: 2022-04-18 | End: 2022-05-13

## 2022-05-16 ENCOUNTER — APPOINTMENT (OUTPATIENT)
Dept: FAMILY MEDICINE | Facility: HOSPITAL | Age: 62
End: 2022-05-16
Payer: COMMERCIAL

## 2022-05-16 ENCOUNTER — NON-APPOINTMENT (OUTPATIENT)
Age: 62
End: 2022-05-16

## 2022-05-16 ENCOUNTER — OUTPATIENT (OUTPATIENT)
Dept: OUTPATIENT SERVICES | Facility: HOSPITAL | Age: 62
LOS: 1 days | End: 2022-05-16
Payer: SELF-PAY

## 2022-05-16 ENCOUNTER — APPOINTMENT (OUTPATIENT)
Dept: FAMILY MEDICINE | Facility: HOSPITAL | Age: 62
End: 2022-05-16

## 2022-05-16 VITALS
HEART RATE: 67 BPM | OXYGEN SATURATION: 98 % | DIASTOLIC BLOOD PRESSURE: 80 MMHG | TEMPERATURE: 97.1 F | RESPIRATION RATE: 16 BRPM | SYSTOLIC BLOOD PRESSURE: 138 MMHG

## 2022-05-16 DIAGNOSIS — Z00.00 ENCOUNTER FOR GENERAL ADULT MEDICAL EXAMINATION WITHOUT ABNORMAL FINDINGS: ICD-10-CM

## 2022-05-16 DIAGNOSIS — Z12.39 ENCOUNTER FOR OTHER SCREENING FOR MALIGNANT NEOPLASM OF BREAST: ICD-10-CM

## 2022-05-16 DIAGNOSIS — I48.91 UNSPECIFIED ATRIAL FIBRILLATION: ICD-10-CM

## 2022-05-16 LAB
CYTOLOGY CVX/VAG DOC THIN PREP: NORMAL
INR BLD: 1.18 RATIO — HIGH (ref 0.88–1.16)
INR PPP: 1.1 RATIO
POCT-PROTHROMBIN TIME: 13.5 SECS
PROTHROM AB SERPL-ACNC: 13.7 SEC — HIGH (ref 10.5–13.4)

## 2022-05-17 ENCOUNTER — APPOINTMENT (OUTPATIENT)
Dept: ULTRASOUND IMAGING | Facility: HOSPITAL | Age: 62
End: 2022-05-17

## 2022-05-17 ENCOUNTER — RESULT REVIEW (OUTPATIENT)
Age: 62
End: 2022-05-17

## 2022-05-17 ENCOUNTER — APPOINTMENT (OUTPATIENT)
Dept: FAMILY MEDICINE | Facility: HOSPITAL | Age: 62
End: 2022-05-17
Payer: COMMERCIAL

## 2022-05-17 ENCOUNTER — RESULT CHARGE (OUTPATIENT)
Age: 62
End: 2022-05-17

## 2022-05-17 ENCOUNTER — OUTPATIENT (OUTPATIENT)
Dept: OUTPATIENT SERVICES | Facility: HOSPITAL | Age: 62
LOS: 1 days | End: 2022-05-17
Payer: SELF-PAY

## 2022-05-17 VITALS
HEIGHT: 65 IN | TEMPERATURE: 96.9 F | SYSTOLIC BLOOD PRESSURE: 126 MMHG | BODY MASS INDEX: 27.32 KG/M2 | HEART RATE: 71 BPM | WEIGHT: 164 LBS | DIASTOLIC BLOOD PRESSURE: 85 MMHG | OXYGEN SATURATION: 98 % | RESPIRATION RATE: 16 BRPM

## 2022-05-17 DIAGNOSIS — Z00.00 ENCOUNTER FOR GENERAL ADULT MEDICAL EXAMINATION WITHOUT ABNORMAL FINDINGS: ICD-10-CM

## 2022-05-17 LAB
INR PPP: 1.1 RATIO
POCT-PROTHROMBIN TIME: 13 SECS

## 2022-05-17 PROCEDURE — 93016 CV STRESS TEST SUPVJ ONLY: CPT

## 2022-05-17 PROCEDURE — 93970 EXTREMITY STUDY: CPT | Mod: 26

## 2022-05-17 PROCEDURE — 78452 HT MUSCLE IMAGE SPECT MULT: CPT | Mod: MH

## 2022-05-17 PROCEDURE — 93018 CV STRESS TEST I&R ONLY: CPT

## 2022-05-17 PROCEDURE — 84443 ASSAY THYROID STIM HORMONE: CPT

## 2022-05-17 PROCEDURE — 78452 HT MUSCLE IMAGE SPECT MULT: CPT | Mod: 26,MH

## 2022-05-17 PROCEDURE — A9500: CPT

## 2022-05-17 PROCEDURE — 93970 EXTREMITY STUDY: CPT

## 2022-05-17 PROCEDURE — 36415 COLL VENOUS BLD VENIPUNCTURE: CPT

## 2022-05-17 PROCEDURE — 80048 BASIC METABOLIC PNL TOTAL CA: CPT

## 2022-05-17 PROCEDURE — G0463: CPT

## 2022-05-17 PROCEDURE — 85610 PROTHROMBIN TIME: CPT

## 2022-05-17 PROCEDURE — 80053 COMPREHEN METABOLIC PANEL: CPT

## 2022-05-17 PROCEDURE — 86038 ANTINUCLEAR ANTIBODIES: CPT

## 2022-05-17 PROCEDURE — 93017 CV STRESS TEST TRACING ONLY: CPT

## 2022-05-17 NOTE — HISTORY OF PRESENT ILLNESS
[FreeTextEntry1] : f/u LE swelling [de-identified] : 61F with PMHx a-fib on coumadin, HTN, chronic LE swelling presents for worsening LE swelling. Pt states swelling was the worse on Saturday but gradually improved with LE elevation and ambulation. Pt also states she had blood clots in her legs before that was diagnosed in the ED. pt states she was recently seen for nuclear stress test referred by Cardiology. Pt had INR checked yesterday which was 1.1. Denies orthopnea, SOB at rest or ambulation, difficulty breathing, redness of LE.

## 2022-05-17 NOTE — ASSESSMENT
[FreeTextEntry1] : 61F with PMHx a-fib on coumadin, HTN, chronic b/l LE edema presents for f/u,\par \par #LE Edema\par -b/l LE U/S unremarkable\par -Educated pt on continuing b/l LE elevation and ambulation\par -Can trial b/l compression stockings\par \par #a-fib\par -Increase Coumadin 7.5mg QHS\par -INR 1.1 today \par \par RTC Saturday for INR check\par \par Case discussed with Dr Johnson

## 2022-05-17 NOTE — PHYSICAL EXAM
[No Acute Distress] : no acute distress [Well Nourished] : well nourished [Well Developed] : well developed [Well-Appearing] : well-appearing [No Respiratory Distress] : no respiratory distress  [No Accessory Muscle Use] : no accessory muscle use [Clear to Auscultation] : lungs were clear to auscultation bilaterally [Normal] : normal rate, regular rhythm, normal S1 and S2 and no murmur heard [Coordination Grossly Intact] : coordination grossly intact [No Focal Deficits] : no focal deficits [Normal Gait] : normal gait [Normal Affect] : the affect was normal [Normal Insight/Judgement] : insight and judgment were intact [Soft] : abdomen soft [Non Tender] : non-tender [Non-distended] : non-distended [de-identified] : 2+ RLE edema; 1+ LLE edema

## 2022-05-17 NOTE — REVIEW OF SYSTEMS
[Fever] : no fever [Chills] : no chills [Chest Pain] : no chest pain [Palpitations] : no palpitations [Orthopnea] : no orthopnea [Shortness Of Breath] : no shortness of breath [Wheezing] : no wheezing [Cough] : no cough [Abdominal Pain] : no abdominal pain [Nausea] : no nausea [Vomiting] : no vomiting [FreeTextEntry9] : b/l LE swelling

## 2022-05-18 LAB
ALBUMIN SERPL ELPH-MCNC: 4.2 G/DL
ALP BLD-CCNC: 76 U/L
ALT SERPL-CCNC: 22 U/L
ANA SER IF-ACNC: NEGATIVE
ANION GAP SERPL CALC-SCNC: 10 MMOL/L
ANION GAP SERPL CALC-SCNC: 11 MMOL/L
AST SERPL-CCNC: 23 U/L
BILIRUB SERPL-MCNC: 0.4 MG/DL
BUN SERPL-MCNC: 13 MG/DL
BUN SERPL-MCNC: 13 MG/DL
CALCIUM SERPL-MCNC: 9.8 MG/DL
CALCIUM SERPL-MCNC: 9.8 MG/DL
CHLORIDE SERPL-SCNC: 104 MMOL/L
CHLORIDE SERPL-SCNC: 105 MMOL/L
CO2 SERPL-SCNC: 27 MMOL/L
CO2 SERPL-SCNC: 28 MMOL/L
CREAT SERPL-MCNC: 0.62 MG/DL
CREAT SERPL-MCNC: 0.63 MG/DL
EGFR: 101 ML/MIN/1.73M2
EGFR: 101 ML/MIN/1.73M2
GLUCOSE SERPL-MCNC: 86 MG/DL
GLUCOSE SERPL-MCNC: 87 MG/DL
POTASSIUM SERPL-SCNC: 3.9 MMOL/L
POTASSIUM SERPL-SCNC: 3.9 MMOL/L
PROT SERPL-MCNC: 6.8 G/DL
SODIUM SERPL-SCNC: 142 MMOL/L
SODIUM SERPL-SCNC: 143 MMOL/L
TSH SERPL-ACNC: 1.29 UIU/ML

## 2022-05-18 NOTE — REVIEW OF SYSTEMS
[Lower Ext Edema] : lower extremity edema [Negative] : Respiratory [FreeTextEntry5] : bilateral lower extremity swelling up to the level of ankle

## 2022-05-18 NOTE — PHYSICAL EXAM
[Normal] : no respiratory distress, lungs were clear to auscultation bilaterally and no accessory muscle use [de-identified] : irregularly irregular rate [de-identified] : bilateral lower extremity swelling at the level of the ankle; 2+ pitting edema

## 2022-05-18 NOTE — HISTORY OF PRESENT ILLNESS
[FreeTextEntry1] : INR check  [de-identified] : Patient is a 60 y/o F with a PMH of asthma, Afib, and grade III diastolic dysfunction presenting to clinic for INR check. Her INR from 5/13/22 was 1.1. She was instructed to take 7.5 mg of warfarin daily. However, today patient has medication bottles with her and she has only been taking 1 mg daily as that is the only prescription bottle that she has.

## 2022-05-20 DIAGNOSIS — Z79.01 LONG TERM (CURRENT) USE OF ANTICOAGULANTS: ICD-10-CM

## 2022-05-20 DIAGNOSIS — R60.0 LOCALIZED EDEMA: ICD-10-CM

## 2022-05-20 NOTE — PHYSICAL EXAM
[Normal Outer Ear/Nose] : the outer ears and nose were normal in appearance [Normal Oropharynx] : the oropharynx was normal [Normal Supraclavicular Nodes] : no supraclavicular lymphadenopathy [Normal Axillary Nodes] : no axillary lymphadenopathy [Normal Anterior Cervical Nodes] : no anterior cervical lymphadenopathy [No Acute Distress] : no acute distress [Well-Appearing] : well-appearing [Normal Voice/Communication] : normal voice/communication [Normal Sclera/Conjunctiva] : normal sclera/conjunctiva [PERRL] : pupils equal round and reactive to light [EOMI] : extraocular movements intact [No JVD] : no jugular venous distention [No Lymphadenopathy] : no lymphadenopathy [Supple] : supple [No Respiratory Distress] : no respiratory distress  [No Accessory Muscle Use] : no accessory muscle use [Clear to Auscultation] : lungs were clear to auscultation bilaterally [No Murmur] : no murmur heard [Normal Appearance] : normal in appearance [No Masses] : no palpable masses [No Nipple Discharge] : no nipple discharge [No Axillary Lymphadenopathy] : no axillary lymphadenopathy [Soft] : abdomen soft [Non Tender] : non-tender [Non-distended] : non-distended [No Varicosities] : no varicosities [No Extremity Clubbing/Cyanosis] : no extremity clubbing/cyanosis [Normal Posterior Cervical Nodes] : no posterior cervical lymphadenopathy [de-identified] : Irregularly irregular [de-identified] : Trace b/l foot edema, no evidence of calf pain/erythema/edema [de-identified] : No new areas of echymosis or evidence of bleeding

## 2022-05-20 NOTE — HISTORY OF PRESENT ILLNESS
[Family Member] : family member [FreeTextEntry8] : Pt is a 62 yo F w PMH of HTN, Asthma & newly diagnosed Afib presents for CSP. Of note, pt is AAOx3 but has poor insight into own medical condition. She presents with her niece who helps collaborate on her hx. Today pt presents for colon cancer screening and breast cancer screening. Pt generally feels well and has no red flag sxs suggesting malignancy. Pt denies fevers, night sweats, sudden unexplained weight loss. She has no n/v/ab pain, melena, hematochezia, changes in bm or stool caliber. She also denies new breast masses or area skin changes, nipple retraction, or discharge/blood expression. No other sxs or complaints expect for b/l feet swelling as described in other note. [FreeTextEntry1] : INR check, bp check [de-identified] : Pt is a 62 yo F w PMH of HTN, Asthma & newly diagnosed Afib presents for INR check and CSP. Of note, pt is AAOx3 but has poor insight into own medical condition. She presents with her niece who helps collaborate on her hx. Pt is feeling well today, no complaints of ha, dizziness, palpitations, SOB, and denies any easy bruising/bleeding, new onset fatigue/weakness/paleness from being on warfarin.\par She only c/o of b/l foot swelling, no alleviating factors but aggravating by standing on her feet for long shifts at work, no associated sxs, and denying dyspnea, orthopnea, PND. No other complaints or symptoms reported today.

## 2022-05-20 NOTE — PHYSICAL EXAM
[Normal Outer Ear/Nose] : the outer ears and nose were normal in appearance [Normal Oropharynx] : the oropharynx was normal [Normal Supraclavicular Nodes] : no supraclavicular lymphadenopathy [Normal Axillary Nodes] : no axillary lymphadenopathy [Normal Anterior Cervical Nodes] : no anterior cervical lymphadenopathy [No Acute Distress] : no acute distress [Well-Appearing] : well-appearing [Normal Voice/Communication] : normal voice/communication [Normal Sclera/Conjunctiva] : normal sclera/conjunctiva [PERRL] : pupils equal round and reactive to light [EOMI] : extraocular movements intact [No JVD] : no jugular venous distention [No Lymphadenopathy] : no lymphadenopathy [Supple] : supple [No Respiratory Distress] : no respiratory distress  [No Accessory Muscle Use] : no accessory muscle use [Clear to Auscultation] : lungs were clear to auscultation bilaterally [No Murmur] : no murmur heard [Normal Appearance] : normal in appearance [No Masses] : no palpable masses [No Nipple Discharge] : no nipple discharge admission [No Axillary Lymphadenopathy] : no axillary lymphadenopathy [Soft] : abdomen soft [Non Tender] : non-tender [Non-distended] : non-distended [No Varicosities] : no varicosities [No Extremity Clubbing/Cyanosis] : no extremity clubbing/cyanosis [Normal Posterior Cervical Nodes] : no posterior cervical lymphadenopathy [de-identified] : Irregularly irregular [de-identified] : Trace b/l foot edema, no evidence of calf pain/erythema/edema [de-identified] : No new areas of echymosis or evidence of bleeding

## 2022-05-20 NOTE — PLAN
[FreeTextEntry1] : Pt is a 62 yo F w PMH of HTN, Asthma & newly diagnosed Afib presents for INR check and CSP. She is presenting for: \par \par #Afib\par #INR check\par - Pt w/hx of afib, irregular r&r on cardio exam, otherwise ASX, denying dizziness, SOB/palpitations/cp however poor insight into own condition. It is imperative that she gets every intervention and condition thoroughly and completely explained to her on EVERY VISIT.\par - INR 1.4>1.5>1.1 WORSE TODAY, was on Warfarin 6 mg, will be increased to 7.5 mg\par - C/w metoprolol ER 25 mg for now; DOSE WITH CAUTION as pt has a known hx of bradycardia\par - Repeat INR check in 3 days\par \par #HTN\par - Pt ASX but still hypertensive on this visit at 145/81\par - C/w Losartan-HCTZ 50-12.5 for now as she has only been on it for a few days, however if bp is elevated, meds should be titrated up on next visit.\par \par #B/l Foot Swelling\par - Pt notes sxs worse after standing during work shift, trace b/l foot edema noted on PE, no evidence of calf pain, erythema, or edema.\par - Educated on elevating feet above the level of the heart\par - PRN compression socks\par \par Case discussed w/Dr. Johnson

## 2022-05-20 NOTE — REVIEW OF SYSTEMS
[Cough] : no cough [Constipation] : no constipation [Diarrhea] : diarrhea [Melena] : no melena [Fever] : no fever [Chills] : no chills [Night Sweats] : no night sweats [Recent Change In Weight] : ~T no recent weight change [Discharge] : no discharge [Pain] : no pain [Redness] : no redness [Nosebleed] : no nosebleeds [Hoarseness] : no hoarseness [Sore Throat] : no sore throat [Chest Pain] : no chest pain [Palpitations] : no palpitations [Orthopnea] : no orthopnea [Paroxysmal Nocturnal Dyspnea] : no paroxysmal nocturnal dyspnea [Shortness Of Breath] : no shortness of breath [Wheezing] : no wheezing [Dyspnea on Exertion] : no dyspnea on exertion [Abdominal Pain] : no abdominal pain [Nausea] : no nausea [Vomiting] : no vomiting [Easy Bleeding] : no easy bleeding [Easy Bruising] : no easy bruising [Swollen Glands] : no swollen glands [FreeTextEntry9] : B [FreeTextEntry1] : B/l foot swelling

## 2022-05-20 NOTE — HISTORY OF PRESENT ILLNESS
[Family Member] : family member [FreeTextEntry8] : Pt is a 62 yo F w PMH of HTN, Asthma & newly diagnosed Afib presents for CSP. Of note, pt is AAOx3 but has poor insight into own medical condition. She presents with her niece who helps collaborate on her hx. Today pt presents for colon cancer screening and breast cancer screening. Pt generally feels well and has no red flag sxs suggesting malignancy. Pt denies fevers, night sweats, sudden unexplained weight loss. She has no n/v/ab pain, melena, hematochezia, changes in bm or stool caliber. She also denies new breast masses or area skin changes, nipple retraction, or discharge/blood expression. No other sxs or complaints expect for b/l feet swelling as described in other note. [FreeTextEntry1] : INR check, bp check [de-identified] : Pt is a 62 yo F w PMH of HTN, Asthma & newly diagnosed Afib presents for INR check and CSP. Of note, pt is AAOx3 but has poor insight into own medical condition. She presents with her niece who helps collaborate on her hx. Pt is feeling well today, no complaints of ha, dizziness, palpitations, SOB, and denies any easy bruising/bleeding, new onset fatigue/weakness/paleness from being on warfarin.\par She only c/o of b/l foot swelling, no alleviating factors but aggravating by standing on her feet for long shifts at work, no associated sxs, and denying dyspnea, orthopnea, PND. No other complaints or symptoms reported today.

## 2022-05-21 ENCOUNTER — RESULT CHARGE (OUTPATIENT)
Age: 62
End: 2022-05-21

## 2022-05-21 ENCOUNTER — APPOINTMENT (OUTPATIENT)
Dept: FAMILY MEDICINE | Facility: HOSPITAL | Age: 62
End: 2022-05-21

## 2022-05-21 ENCOUNTER — OUTPATIENT (OUTPATIENT)
Dept: OUTPATIENT SERVICES | Facility: HOSPITAL | Age: 62
LOS: 1 days | End: 2022-05-21
Payer: SELF-PAY

## 2022-05-21 VITALS
TEMPERATURE: 97.6 F | BODY MASS INDEX: 27.62 KG/M2 | HEART RATE: 68 BPM | RESPIRATION RATE: 18 BRPM | DIASTOLIC BLOOD PRESSURE: 84 MMHG | OXYGEN SATURATION: 99 % | SYSTOLIC BLOOD PRESSURE: 146 MMHG | WEIGHT: 166 LBS

## 2022-05-21 DIAGNOSIS — Z00.00 ENCOUNTER FOR GENERAL ADULT MEDICAL EXAMINATION WITHOUT ABNORMAL FINDINGS: ICD-10-CM

## 2022-05-21 PROCEDURE — G0463: CPT

## 2022-05-23 DIAGNOSIS — R60.0 LOCALIZED EDEMA: ICD-10-CM

## 2022-05-23 DIAGNOSIS — I48.91 UNSPECIFIED ATRIAL FIBRILLATION: ICD-10-CM

## 2022-05-23 DIAGNOSIS — Z79.01 LONG TERM (CURRENT) USE OF ANTICOAGULANTS: ICD-10-CM

## 2022-05-23 LAB
INR PPP: 1.8 RATIO
POCT-PROTHROMBIN TIME: ABNORMAL SECS
QUALITY CONTROL: YES

## 2022-05-25 DIAGNOSIS — Z79.01 LONG TERM (CURRENT) USE OF ANTICOAGULANTS: ICD-10-CM

## 2022-05-27 ENCOUNTER — APPOINTMENT (OUTPATIENT)
Dept: FAMILY MEDICINE | Facility: HOSPITAL | Age: 62
End: 2022-05-27

## 2022-05-27 ENCOUNTER — OUTPATIENT (OUTPATIENT)
Dept: OUTPATIENT SERVICES | Facility: HOSPITAL | Age: 62
LOS: 1 days | End: 2022-05-27
Payer: SELF-PAY

## 2022-05-27 ENCOUNTER — RESULT CHARGE (OUTPATIENT)
Age: 62
End: 2022-05-27

## 2022-05-27 VITALS
BODY MASS INDEX: 25.96 KG/M2 | SYSTOLIC BLOOD PRESSURE: 139 MMHG | TEMPERATURE: 97 F | RESPIRATION RATE: 18 BRPM | OXYGEN SATURATION: 95 % | HEART RATE: 106 BPM | WEIGHT: 156 LBS | DIASTOLIC BLOOD PRESSURE: 83 MMHG

## 2022-05-27 DIAGNOSIS — Z00.00 ENCOUNTER FOR GENERAL ADULT MEDICAL EXAMINATION WITHOUT ABNORMAL FINDINGS: ICD-10-CM

## 2022-05-27 PROCEDURE — 82274 ASSAY TEST FOR BLOOD FECAL: CPT

## 2022-05-27 PROCEDURE — G0463: CPT

## 2022-05-27 RX ORDER — WARFARIN 7.5 MG/1
7.5 TABLET ORAL AT BEDTIME
Qty: 4 | Refills: 0 | Status: COMPLETED | COMMUNITY
Start: 2022-05-13 | End: 2022-05-27

## 2022-05-28 LAB
INR PPP: 1.4 RATIO
POCT-PROTHROMBIN TIME: 16.3 SECS
QUALITY CONTROL: YES

## 2022-05-29 NOTE — HISTORY OF PRESENT ILLNESS
[FreeTextEntry1] : INR check  [de-identified] : 61 F with PMHx Afib on Coumadin presents today for INR check. Patient currently on 5.5mg Coumadin qhs. Brings in her bottles of coumadin 5mg and of 1mg tablets. States that she takes 1 tab of 5mg and half of 1mg, which is labelled with green post-it note as half. Patient has hand out at home with foods that affect warfarin, which she seems to believe are the only foods she can eat, she questioned what she could eat when she is hungry, questioned about tuna and yogurt. She has been eating spinach about 1 cup when she does. Patient denies fever, chills, headaches, dizziness, chest pain, shortness of breath, cough, palpitations, nausea, vomiting, diarrhea, abdominal pain, leg pain, leg edema, or weakness.\par

## 2022-05-30 DIAGNOSIS — Z79.01 LONG TERM (CURRENT) USE OF ANTICOAGULANTS: ICD-10-CM

## 2022-06-01 ENCOUNTER — APPOINTMENT (OUTPATIENT)
Dept: FAMILY MEDICINE | Facility: HOSPITAL | Age: 62
End: 2022-06-01

## 2022-06-01 ENCOUNTER — RESULT CHARGE (OUTPATIENT)
Age: 62
End: 2022-06-01

## 2022-06-01 ENCOUNTER — OUTPATIENT (OUTPATIENT)
Dept: OUTPATIENT SERVICES | Facility: HOSPITAL | Age: 62
LOS: 1 days | End: 2022-06-01
Payer: COMMERCIAL

## 2022-06-01 VITALS
RESPIRATION RATE: 18 BRPM | SYSTOLIC BLOOD PRESSURE: 125 MMHG | BODY MASS INDEX: 26.29 KG/M2 | WEIGHT: 158 LBS | DIASTOLIC BLOOD PRESSURE: 81 MMHG | TEMPERATURE: 97 F | OXYGEN SATURATION: 99 % | HEART RATE: 59 BPM

## 2022-06-01 DIAGNOSIS — Z00.00 ENCOUNTER FOR GENERAL ADULT MEDICAL EXAMINATION WITHOUT ABNORMAL FINDINGS: ICD-10-CM

## 2022-06-01 PROCEDURE — G0463: CPT

## 2022-06-02 ENCOUNTER — NON-APPOINTMENT (OUTPATIENT)
Age: 62
End: 2022-06-02

## 2022-06-03 LAB — HEMOCCULT STL QL IA: NEGATIVE

## 2022-06-05 DIAGNOSIS — Z79.01 LONG TERM (CURRENT) USE OF ANTICOAGULANTS: ICD-10-CM

## 2022-06-05 DIAGNOSIS — I48.91 UNSPECIFIED ATRIAL FIBRILLATION: ICD-10-CM

## 2022-06-05 NOTE — PLAN
[FreeTextEntry1] : \par #Afib on coumadin\par -INR 2.0 today\par -Continue Coumadin 7mg QD, d/w pt and daughter in office\par -RTC in 1 week for repeat INR check

## 2022-06-05 NOTE — HISTORY OF PRESENT ILLNESS
[FreeTextEntry1] : INR check [de-identified] : 60 y/o F PMHx Afib on Coumadin presenting for INR check. Was seen last week and found to have subtherapeutic INR and instructed to increased dose to 7mg daily. Pt has been taking medication as instructed and denies any issues. Denies fever, chills, HA, dizziness, SOB, CP, cough, palpitations, n/v/d, abd pain.\par \par

## 2022-06-06 ENCOUNTER — RESULT REVIEW (OUTPATIENT)
Age: 62
End: 2022-06-06

## 2022-06-06 ENCOUNTER — APPOINTMENT (OUTPATIENT)
Dept: MAMMOGRAPHY | Facility: CLINIC | Age: 62
End: 2022-06-06
Payer: COMMERCIAL

## 2022-06-06 PROCEDURE — 77063 BREAST TOMOSYNTHESIS BI: CPT

## 2022-06-06 PROCEDURE — 77067 SCR MAMMO BI INCL CAD: CPT

## 2022-06-08 ENCOUNTER — OUTPATIENT (OUTPATIENT)
Dept: OUTPATIENT SERVICES | Facility: HOSPITAL | Age: 62
LOS: 1 days | End: 2022-06-08
Payer: COMMERCIAL

## 2022-06-08 ENCOUNTER — RESULT CHARGE (OUTPATIENT)
Age: 62
End: 2022-06-08

## 2022-06-08 ENCOUNTER — APPOINTMENT (OUTPATIENT)
Dept: FAMILY MEDICINE | Facility: HOSPITAL | Age: 62
End: 2022-06-08

## 2022-06-08 VITALS
DIASTOLIC BLOOD PRESSURE: 91 MMHG | HEART RATE: 58 BPM | BODY MASS INDEX: 26.46 KG/M2 | RESPIRATION RATE: 18 BRPM | SYSTOLIC BLOOD PRESSURE: 137 MMHG | WEIGHT: 159 LBS | OXYGEN SATURATION: 96 % | TEMPERATURE: 98.2 F

## 2022-06-08 DIAGNOSIS — Z00.00 ENCOUNTER FOR GENERAL ADULT MEDICAL EXAMINATION WITHOUT ABNORMAL FINDINGS: ICD-10-CM

## 2022-06-08 LAB
INR BLD: 8.4 RATIO — CRITICAL HIGH (ref 0.88–1.16)
INR PPP: 7.7 RATIO
POCT-PROTHROMBIN TIME: 91.9 SECS
PROTHROM AB SERPL-ACNC: 99.6 SEC — HIGH (ref 10.5–13.4)
QUALITY CONTROL: YES

## 2022-06-08 PROCEDURE — 36415 COLL VENOUS BLD VENIPUNCTURE: CPT

## 2022-06-08 PROCEDURE — G0463: CPT

## 2022-06-08 PROCEDURE — 85610 PROTHROMBIN TIME: CPT

## 2022-06-09 NOTE — PHYSICAL EXAM
[No Acute Distress] : no acute distress [Normal Sclera/Conjunctiva] : normal sclera/conjunctiva [PERRL] : pupils equal round and reactive to light [No JVD] : no jugular venous distention [No Lymphadenopathy] : no lymphadenopathy [No Respiratory Distress] : no respiratory distress  [No Accessory Muscle Use] : no accessory muscle use [Clear to Auscultation] : lungs were clear to auscultation bilaterally [Normal Rate] : normal rate  [Regular Rhythm] : with a regular rhythm [Normal S1, S2] : normal S1 and S2 [Soft] : abdomen soft [Non Tender] : non-tender [Non-distended] : non-distended [Normal Supraclavicular Nodes] : no supraclavicular lymphadenopathy [Normal Posterior Cervical Nodes] : no posterior cervical lymphadenopathy [Normal Anterior Cervical Nodes] : no anterior cervical lymphadenopathy [No Extremity Clubbing/Cyanosis] : no extremity clubbing/cyanosis [de-identified] : Trace calf/ankle/foot edema b/l but no pain or redness

## 2022-06-09 NOTE — HISTORY OF PRESENT ILLNESS
[FreeTextEntry1] : INR check [de-identified] : Pt is a 60 yo F, PMH of HTN, Asthma, and Afib presents for INR check. Of note, pt's last INR was therapeutic at 2 and pt was asked to take 7 mg warfarin at night. During interview, pt does report taking 7 mg at night, however says that she also take 5 mg at morning. Pt otherwise feels well, denying any easy bleeding or new onset bruises. She also has no Ha, vision blurriness, dizziness, SOB, palpitations. No other sxs or complaints reported at this time.

## 2022-06-09 NOTE — REVIEW OF SYSTEMS
[Lower Ext Edema] : lower extremity edema [Fever] : no fever [Chills] : no chills [Recent Change In Weight] : ~T no recent weight change [Vision Problems] : no vision problems [Nosebleed] : no nosebleeds [Sore Throat] : no sore throat [Chest Pain] : no chest pain [Palpitations] : no palpitations [Orthopnea] : no orthopnea [Shortness Of Breath] : no shortness of breath [Wheezing] : no wheezing [Dyspnea on Exertion] : no dyspnea on exertion [Abdominal Pain] : no abdominal pain [Vomiting] : no vomiting [Joint Swelling] : no joint swelling [Muscle Weakness] : no muscle weakness [Easy Bleeding] : no easy bleeding [Easy Bruising] : no easy bruising

## 2022-06-09 NOTE — END OF VISIT
[] : Resident [FreeTextEntry3] : patient and daughter confused about dosing and patient's taking extra dose of 5 mg in am. I asked her to hold medication for 3 days and rtc on Saturday and will restart med. patient didn't have any signs or symptoms of bleeding at the visit but advised to go to ER if any issues. I talked to Dr. Graham who patient has appointment with, and explained her situation and need for close monitoring of her INR.

## 2022-06-09 NOTE — PLAN
[FreeTextEntry1] : Pt is a 62 yo F, PMH of HTN, Asthma, and Afib presents for INR check. \par \par #INR Check\par #Chronic Afib\par - Pt feeling well, vss, no easy bleeding/bruising but has been taking extra 5 mg of warfarin in the morning with POCT now supratherapeutic at 7.4\par - ALL WARFARIN HELD for now; will be reassessed during appt on Saturday\par - C/w Metoprolol 25 qD\par - Pt will go to ER if any unexplained bruising or bleeding\par - RTC in 3 days for INR check\par \par #B/L calf/ankle edema\par - Pt has been on her feet more than usual today, now presenting w/trace b/l calf/ankle edema but no pain/erythema, no evidence of DVT at this point\par - Conservative mgt for now, leg elevation, compression socks, etc.\par \par Case discussed with Rashmi

## 2022-06-11 ENCOUNTER — OUTPATIENT (OUTPATIENT)
Dept: OUTPATIENT SERVICES | Facility: HOSPITAL | Age: 62
LOS: 1 days | End: 2022-06-11
Payer: COMMERCIAL

## 2022-06-11 ENCOUNTER — RESULT CHARGE (OUTPATIENT)
Age: 62
End: 2022-06-11

## 2022-06-11 ENCOUNTER — APPOINTMENT (OUTPATIENT)
Dept: FAMILY MEDICINE | Facility: HOSPITAL | Age: 62
End: 2022-06-11

## 2022-06-11 VITALS
TEMPERATURE: 97.3 F | HEART RATE: 70 BPM | RESPIRATION RATE: 16 BRPM | OXYGEN SATURATION: 99 % | SYSTOLIC BLOOD PRESSURE: 128 MMHG | DIASTOLIC BLOOD PRESSURE: 78 MMHG

## 2022-06-11 DIAGNOSIS — Z00.00 ENCOUNTER FOR GENERAL ADULT MEDICAL EXAMINATION WITHOUT ABNORMAL FINDINGS: ICD-10-CM

## 2022-06-11 LAB
INR PPP: 1.5 RATIO
POCT-PROTHROMBIN TIME: 18.6 SECS
QUALITY CONTROL: YES

## 2022-06-11 PROCEDURE — G0463: CPT

## 2022-06-13 ENCOUNTER — RESULT CHARGE (OUTPATIENT)
Age: 62
End: 2022-06-13

## 2022-06-14 ENCOUNTER — APPOINTMENT (OUTPATIENT)
Dept: FAMILY MEDICINE | Facility: HOSPITAL | Age: 62
End: 2022-06-14
Payer: COMMERCIAL

## 2022-06-14 ENCOUNTER — RESULT REVIEW (OUTPATIENT)
Age: 62
End: 2022-06-14

## 2022-06-14 ENCOUNTER — RESULT CHARGE (OUTPATIENT)
Age: 62
End: 2022-06-14

## 2022-06-14 ENCOUNTER — OUTPATIENT (OUTPATIENT)
Dept: OUTPATIENT SERVICES | Facility: HOSPITAL | Age: 62
LOS: 1 days | End: 2022-06-14
Payer: COMMERCIAL

## 2022-06-14 VITALS
WEIGHT: 153 LBS | RESPIRATION RATE: 16 BRPM | DIASTOLIC BLOOD PRESSURE: 73 MMHG | TEMPERATURE: 97.3 F | OXYGEN SATURATION: 98 % | SYSTOLIC BLOOD PRESSURE: 109 MMHG | HEART RATE: 77 BPM | BODY MASS INDEX: 25.46 KG/M2

## 2022-06-14 DIAGNOSIS — R07.89 OTHER CHEST PAIN: ICD-10-CM

## 2022-06-14 DIAGNOSIS — Z00.00 ENCOUNTER FOR GENERAL ADULT MEDICAL EXAMINATION WITHOUT ABNORMAL FINDINGS: ICD-10-CM

## 2022-06-14 PROCEDURE — G0463: CPT

## 2022-06-14 PROCEDURE — 71045 X-RAY EXAM CHEST 1 VIEW: CPT

## 2022-06-14 PROCEDURE — 93010 ELECTROCARDIOGRAM REPORT: CPT

## 2022-06-14 PROCEDURE — 93005 ELECTROCARDIOGRAM TRACING: CPT

## 2022-06-14 PROCEDURE — 71045 X-RAY EXAM CHEST 1 VIEW: CPT | Mod: 26

## 2022-06-15 ENCOUNTER — APPOINTMENT (OUTPATIENT)
Dept: FAMILY MEDICINE | Facility: HOSPITAL | Age: 62
End: 2022-06-15

## 2022-06-15 ENCOUNTER — OUTPATIENT (OUTPATIENT)
Dept: OUTPATIENT SERVICES | Facility: HOSPITAL | Age: 62
LOS: 1 days | End: 2022-06-15
Payer: COMMERCIAL

## 2022-06-15 DIAGNOSIS — Z00.00 ENCOUNTER FOR GENERAL ADULT MEDICAL EXAMINATION WITHOUT ABNORMAL FINDINGS: ICD-10-CM

## 2022-06-15 PROCEDURE — G0463: CPT

## 2022-06-17 ENCOUNTER — OUTPATIENT (OUTPATIENT)
Dept: OUTPATIENT SERVICES | Facility: HOSPITAL | Age: 62
LOS: 1 days | End: 2022-06-17

## 2022-06-17 DIAGNOSIS — R60.0 LOCALIZED EDEMA: ICD-10-CM

## 2022-06-17 DIAGNOSIS — Z00.00 ENCOUNTER FOR GENERAL ADULT MEDICAL EXAMINATION WITHOUT ABNORMAL FINDINGS: ICD-10-CM

## 2022-06-17 DIAGNOSIS — I48.91 UNSPECIFIED ATRIAL FIBRILLATION: ICD-10-CM

## 2022-06-17 DIAGNOSIS — Z79.01 LONG TERM (CURRENT) USE OF ANTICOAGULANTS: ICD-10-CM

## 2022-06-20 ENCOUNTER — OUTPATIENT (OUTPATIENT)
Dept: OUTPATIENT SERVICES | Facility: HOSPITAL | Age: 62
LOS: 1 days | End: 2022-06-20
Payer: COMMERCIAL

## 2022-06-20 ENCOUNTER — RESULT CHARGE (OUTPATIENT)
Age: 62
End: 2022-06-20

## 2022-06-20 ENCOUNTER — APPOINTMENT (OUTPATIENT)
Dept: FAMILY MEDICINE | Facility: HOSPITAL | Age: 62
End: 2022-06-20

## 2022-06-20 VITALS
RESPIRATION RATE: 16 BRPM | DIASTOLIC BLOOD PRESSURE: 81 MMHG | SYSTOLIC BLOOD PRESSURE: 138 MMHG | HEART RATE: 65 BPM | TEMPERATURE: 96.9 F | OXYGEN SATURATION: 99 %

## 2022-06-20 DIAGNOSIS — I48.91 UNSPECIFIED ATRIAL FIBRILLATION: ICD-10-CM

## 2022-06-20 DIAGNOSIS — Z00.00 ENCOUNTER FOR GENERAL ADULT MEDICAL EXAMINATION WITHOUT ABNORMAL FINDINGS: ICD-10-CM

## 2022-06-20 DIAGNOSIS — Z79.01 LONG TERM (CURRENT) USE OF ANTICOAGULANTS: ICD-10-CM

## 2022-06-20 LAB
INR PPP: 2.6 RATIO
POCT-PROTHROMBIN TIME: 31.7 SECS
QUALITY CONTROL: YES

## 2022-06-20 PROCEDURE — G0463: CPT

## 2022-06-20 NOTE — PLAN
[FreeTextEntry1] : # Afib on coumadin\par - continue 7mg at night\par - Today INR POCT 2.6\par - RTC in 1 week\par \par # HTN\par - continue losartan HCTZ\par - low salt diet ( DASH diet)\par \par RTC in 1 week for repeat INR \par \par d/w Dr. Caraballo

## 2022-06-20 NOTE — PHYSICAL EXAM
[No Acute Distress] : no acute distress [Well Nourished] : well nourished [Well Developed] : well developed [Well-Appearing] : well-appearing [No JVD] : no jugular venous distention [No Lymphadenopathy] : no lymphadenopathy [Supple] : supple [Thyroid Normal, No Nodules] : the thyroid was normal and there were no nodules present [No Respiratory Distress] : no respiratory distress  [No Accessory Muscle Use] : no accessory muscle use [Clear to Auscultation] : lungs were clear to auscultation bilaterally [Normal Rate] : normal rate  [Regular Rhythm] : with a regular rhythm [Normal S1, S2] : normal S1 and S2 [No Murmur] : no murmur heard [Pedal Pulses Present] : the pedal pulses are present [de-identified] : improved bilateral ankle swelling

## 2022-06-20 NOTE — END OF VISIT
[] : Resident [FreeTextEntry3] : patient has different doses of Coumadin for adjustment of dose if needed but she is on 7 mf daily for now, will check her INR in 1 week.

## 2022-06-20 NOTE — HISTORY OF PRESENT ILLNESS
[FreeTextEntry1] : INR check [de-identified] : 60 y/o F with hx of afib and HTN on Coumadin. Patient last seen one week ago for INR check and leg swelling. Leg selling has improved per patient. Patient has been taking 7 mg of coumadin at night as prescribed in last visit. Todays INR 2.6. Otherwise patient with no complaints. Denies fevers, chills, n/v, cough, sick contacts, SOB, CP, urinary /bowel changes.\par \par

## 2022-06-21 ENCOUNTER — NON-APPOINTMENT (OUTPATIENT)
Age: 62
End: 2022-06-21

## 2022-06-21 LAB
INR PPP: 1.3 RATIO
POCT-PROTHROMBIN TIME: 15.6 SECS

## 2022-06-22 DIAGNOSIS — Z79.1 LONG TERM (CURRENT) USE OF NON-STEROIDAL ANTI-INFLAMMATORIES (NSAID): ICD-10-CM

## 2022-06-22 DIAGNOSIS — I48.91 UNSPECIFIED ATRIAL FIBRILLATION: ICD-10-CM

## 2022-06-22 DIAGNOSIS — I10 ESSENTIAL (PRIMARY) HYPERTENSION: ICD-10-CM

## 2022-06-30 ENCOUNTER — APPOINTMENT (OUTPATIENT)
Dept: FAMILY MEDICINE | Facility: HOSPITAL | Age: 62
End: 2022-06-30

## 2022-06-30 ENCOUNTER — OUTPATIENT (OUTPATIENT)
Dept: OUTPATIENT SERVICES | Facility: HOSPITAL | Age: 62
LOS: 1 days | End: 2022-06-30
Payer: COMMERCIAL

## 2022-06-30 ENCOUNTER — RESULT CHARGE (OUTPATIENT)
Age: 62
End: 2022-06-30

## 2022-06-30 VITALS
WEIGHT: 164 LBS | OXYGEN SATURATION: 98 % | BODY MASS INDEX: 27.29 KG/M2 | TEMPERATURE: 96.6 F | DIASTOLIC BLOOD PRESSURE: 76 MMHG | RESPIRATION RATE: 16 BRPM | SYSTOLIC BLOOD PRESSURE: 144 MMHG | HEART RATE: 67 BPM

## 2022-06-30 DIAGNOSIS — Z00.00 ENCOUNTER FOR GENERAL ADULT MEDICAL EXAMINATION WITHOUT ABNORMAL FINDINGS: ICD-10-CM

## 2022-06-30 LAB
INR PPP: 2 RATIO
INR PPP: 3.3 RATIO
POCT-PROTHROMBIN TIME: 23.7 SECS
POCT-PROTHROMBIN TIME: 39.2 SECS
QUALITY CONTROL: YES

## 2022-06-30 PROCEDURE — G0463: CPT

## 2022-06-30 NOTE — HISTORY OF PRESENT ILLNESS
[FreeTextEntry1] : INR check.  [de-identified] : 62 y/o F with hx of afib and HTN on Coumadin. Patient last seen one week ago for INR check and leg swelling. Leg swelling has improved per patient. Patient has been taking 6 mg of coumadin at night as prescribed in last visit. Todays INR 3.3  Patient has gained about 13 pounds since last visit. May be retaining fluid. Otherwise patient with no complaints. Denies fevers, chills, n/v, cough, sick contacts, SOB, CP, urinary /bowel changes.

## 2022-06-30 NOTE — PHYSICAL EXAM
[No Acute Distress] : no acute distress [Well Nourished] : well nourished [Well Developed] : well developed [Well-Appearing] : well-appearing [No JVD] : no jugular venous distention [No Lymphadenopathy] : no lymphadenopathy [Supple] : supple [Thyroid Normal, No Nodules] : the thyroid was normal and there were no nodules present [No Respiratory Distress] : no respiratory distress  [No Accessory Muscle Use] : no accessory muscle use [Clear to Auscultation] : lungs were clear to auscultation bilaterally [Normal Rate] : normal rate  [Regular Rhythm] : with a regular rhythm [Normal S1, S2] : normal S1 and S2 [No Murmur] : no murmur heard [Pedal Pulses Present] : the pedal pulses are present [de-identified] : bilateral pedal edema +2  [de-identified] : improved bilateral ankle swelling

## 2022-06-30 NOTE — PLAN
[FreeTextEntry1] : # Afib on coumadin\par - Pateint has been taking 6 mg\par - Today INR POCT 3.3\par - Hold tonights dose and then continue 6 mg\par - RTC in 1 week\par \par #HFmrEF\par - increase in weight\par - Will start course of lasix 20mg daily\par - rtc in 1week for follow up\par \par # HTN\par - continue losartan HCTZ\par - low salt diet ( DASH diet)\par \par RTC in 1 week for repeat INR \par \par d/w Dr. Rausch\par

## 2022-07-07 ENCOUNTER — RESULT CHARGE (OUTPATIENT)
Age: 62
End: 2022-07-07

## 2022-07-07 ENCOUNTER — OUTPATIENT (OUTPATIENT)
Dept: OUTPATIENT SERVICES | Facility: HOSPITAL | Age: 62
LOS: 1 days | End: 2022-07-07
Payer: COMMERCIAL

## 2022-07-07 ENCOUNTER — APPOINTMENT (OUTPATIENT)
Dept: FAMILY MEDICINE | Facility: HOSPITAL | Age: 62
End: 2022-07-07

## 2022-07-07 VITALS
BODY MASS INDEX: 26.63 KG/M2 | DIASTOLIC BLOOD PRESSURE: 73 MMHG | WEIGHT: 160 LBS | RESPIRATION RATE: 14 BRPM | TEMPERATURE: 96.6 F | OXYGEN SATURATION: 100 % | HEART RATE: 63 BPM | SYSTOLIC BLOOD PRESSURE: 122 MMHG

## 2022-07-07 DIAGNOSIS — Z00.00 ENCOUNTER FOR GENERAL ADULT MEDICAL EXAMINATION WITHOUT ABNORMAL FINDINGS: ICD-10-CM

## 2022-07-07 LAB
INR PPP: 1.5 RATIO
POCT-PROTHROMBIN TIME: 17.9 SECS
QUALITY CONTROL: YES

## 2022-07-07 PROCEDURE — G0463: CPT

## 2022-07-07 NOTE — PHYSICAL EXAM
[No Acute Distress] : no acute distress [Neck Supple] : was supple [JVP Elevated ___cm] : the JVP was elevated [unfilled] ~Ucm [Normal Rate] : normal rate  [Soft] : abdomen soft [Non Tender] : non-tender [Normal Affect] : the affect was normal [Alert and Oriented x3] : oriented to person, place, and time [Well-Appearing] : well-appearing [Regularly Irregular] : regularly irregular [de-identified] : reproducible chest pain by palpating sternum

## 2022-07-07 NOTE — PHYSICAL EXAM
[Normal] : normal rate, regular rhythm, normal S1 and S2 and no murmur heard [Soft] : abdomen soft [Non Tender] : non-tender [de-identified] : RLE 1+ pitting edema. No signs of edema in LLE

## 2022-07-07 NOTE — ASSESSMENT
[FreeTextEntry1] : 61F with PMHx a-fib on coumadin, chronic LE edema presents for INR check.\par \par #A-fib on Coumadin.\par -INR 1.5 today\par -Pt previously taking 6mg QD; will adjust to 6mg on weekdays and 7mg on weekends\par -Pt has poor literacy; may need to switch from Coumadin to Eliquis vs Xarelto\par \par #Lower extremity edema\par -Counseled pt to continue elevating legs and ambulating\par -Avoid standing for long periods of time\par -Counseled pt to go straight to ED if she develops pain in her legs or associated redness, worsening swelling\par \par RTC 1 week for INR check\par \par Case discussed with Dr Tiwari

## 2022-07-07 NOTE — ASSESSMENT
[FreeTextEntry1] : #Costochondritis\par Ibuprofen\par ECG reviewed, no ST elevation\par \par #Bilateral Leg Swelling\par Echo\par ECG reviewed\par hx of diastolic heart failure\par RTC in one week for f/u\par JVD noted <5cm\par \par #Atrial Fibrillation\par ECG reviewed\par Rate controlled with metoprolol continue\par \par #HTN\par Controlled\par \par #AC on coumadin\par INR 1.3\par Continue coumadin at 7mg daily\par \par RTC in one week\par \par Discussed with Dr. Suggs

## 2022-07-07 NOTE — HISTORY OF PRESENT ILLNESS
[FreeTextEntry8] : 60 y/o F with hx of afib. She says Sunday afternoon, patient says she coughed twice, it was dry, then she started to feel a sharp chest pain. She says it was a 10/10 pain. She says it still feels like a 10/10 pain. She says its a sharp pain that can be reproduced by touching her chest. she says the pain is midline. She denies any chills, fevers, nausea, vomiting. She also feels that her chest is tight. SHe denies any fainting. SHe has noticed bilateral leg swelling that started Sunday afternoon. SHe has been trying to elevate her legs at night.

## 2022-07-07 NOTE — HISTORY OF PRESENT ILLNESS
[FreeTextEntry1] : f/u [de-identified] : 61F with PMHx a-fib on Coumadin presents for INR check. Pt states she is taking Coumadin 6mg every day as prescribed. Denies excessive bleeding or bruising. Pt states she continues to have some RLE swelling though her LLE swelling has improved markedly.

## 2022-07-07 NOTE — PLAN
[FreeTextEntry1] : #Costochondritis\par Ibuprofen\par ECG showed no acute ST changes \par Echo to f/u \par \par #Atrial Fibrillation\par Rate controlled on metoprolol\par ECG reviewed\par Patient has f/u cardio appointment \par \par \par #A/C on coumadin\par INR subtherapeutic at 1.3\par \par # Referral\par Patient has anxiety about complicated medical history, and comorbidities, tearful during visit\par Offered therapy to help with mental state\par Reassurance offered, patient is strong, and her diagnoses do not define her\par \par RTC in one week \par \par Discussed with Dr. Suggs

## 2022-07-07 NOTE — REVIEW OF SYSTEMS
[Fever] : no fever [Discharge] : no discharge [Earache] : no earache [Chest Pain] : no chest pain [Shortness Of Breath] : no shortness of breath [Wheezing] : no wheezing [Cough] : no cough [Dyspnea on Exertion] : no dyspnea on exertion [Abdominal Pain] : no abdominal pain [Nausea] : no nausea [Vomiting] : no vomiting [Dysuria] : no dysuria [Hematuria] : no hematuria [Joint Pain] : no joint pain [Muscle Pain] : no muscle pain

## 2022-07-11 ENCOUNTER — NON-APPOINTMENT (OUTPATIENT)
Age: 62
End: 2022-07-11

## 2022-07-13 DIAGNOSIS — I48.91 UNSPECIFIED ATRIAL FIBRILLATION: ICD-10-CM

## 2022-07-14 ENCOUNTER — RESULT CHARGE (OUTPATIENT)
Age: 62
End: 2022-07-14

## 2022-07-14 ENCOUNTER — OUTPATIENT (OUTPATIENT)
Dept: OUTPATIENT SERVICES | Facility: HOSPITAL | Age: 62
LOS: 1 days | End: 2022-07-14
Payer: COMMERCIAL

## 2022-07-14 ENCOUNTER — APPOINTMENT (OUTPATIENT)
Dept: FAMILY MEDICINE | Facility: HOSPITAL | Age: 62
End: 2022-07-14

## 2022-07-14 VITALS
WEIGHT: 164 LBS | TEMPERATURE: 98.1 F | OXYGEN SATURATION: 99 % | RESPIRATION RATE: 16 BRPM | HEART RATE: 73 BPM | BODY MASS INDEX: 27.29 KG/M2 | SYSTOLIC BLOOD PRESSURE: 158 MMHG | DIASTOLIC BLOOD PRESSURE: 90 MMHG

## 2022-07-14 DIAGNOSIS — I48.91 UNSPECIFIED ATRIAL FIBRILLATION: ICD-10-CM

## 2022-07-14 DIAGNOSIS — Z79.01 LONG TERM (CURRENT) USE OF ANTICOAGULANTS: ICD-10-CM

## 2022-07-14 DIAGNOSIS — I10 ESSENTIAL (PRIMARY) HYPERTENSION: ICD-10-CM

## 2022-07-14 DIAGNOSIS — R60.0 LOCALIZED EDEMA: ICD-10-CM

## 2022-07-14 DIAGNOSIS — Z00.00 ENCOUNTER FOR GENERAL ADULT MEDICAL EXAMINATION WITHOUT ABNORMAL FINDINGS: ICD-10-CM

## 2022-07-14 DIAGNOSIS — I51.89 OTHER ILL-DEFINED HEART DISEASES: ICD-10-CM

## 2022-07-14 PROCEDURE — G0463: CPT

## 2022-07-14 NOTE — HISTORY OF PRESENT ILLNESS
[FreeTextEntry1] : f/u [de-identified] : 62F with PMHx a-fib on coumadin, chronic edema of b/l LE, HTN presents for f/u. Pt states she takes Coumadin "as prescribed" and currently taking "5mg every day". Pt denies excessive bleeding. Pt states her legs have swollen up the past day though admits to sitting and laying down for long periods of time for the past few days. Pt also admits to not having taken HCTZ for the past day as she ran out of medication though was able to refill it today. Denies SOB, calf pain, calf tenderness, calf redness.

## 2022-07-14 NOTE — ASSESSMENT
[FreeTextEntry1] : 62F PMHx a-fib on coumadin, HTN, chronic b/l LE edema presents for f/u.\par \par #A-fib on coumadin\par -INR 3.2 today\par -When going over pt's meds, pt was taking 5mg Coumdin in the AM and 6mg Coumadin in the PM \par -Pt states she is always confused about how to take her Coumadin\par -Pt states she does not eat leafy greens and mostly eats meat and cereal\par -Cardio consult for confirmation of a-fib for insurance approval for Xarelto vs Eliquis \par \par #Chronic bilateral lower extremity edema\par -Counseled pt to avoid sitting and lying down with legs below level of heart for long periods of time\par -Advised pt to walk as much as possible to avoid worsening edema\par \par RTC 1 week for INR check\par \par Case discussed with Dr Caraballo

## 2022-07-14 NOTE — END OF VISIT
[] : Resident [FreeTextEntry3] : I known patient from previous visits, she still is confused about how to take Coumadin, we put all her 5 mg dose in one bottle and told her to take it either at night or morning with 1mg pill. patient will benefit from changing it to another new medication but since she started on med by cardiology will refer back to seek advice.

## 2022-07-14 NOTE — PHYSICAL EXAM
[Normal] : normal rate, regular rhythm, normal S1 and S2 and no murmur heard [Coordination Grossly Intact] : coordination grossly intact [No Focal Deficits] : no focal deficits [Normal Gait] : normal gait [de-identified] : 1+ b/l LE pitting edema R>L

## 2022-07-15 ENCOUNTER — OUTPATIENT (OUTPATIENT)
Dept: OUTPATIENT SERVICES | Facility: HOSPITAL | Age: 62
LOS: 1 days | End: 2022-07-15
Payer: COMMERCIAL

## 2022-07-15 ENCOUNTER — APPOINTMENT (OUTPATIENT)
Dept: CARDIOLOGY | Facility: HOSPITAL | Age: 62
End: 2022-07-15

## 2022-07-15 VITALS
HEIGHT: 65 IN | DIASTOLIC BLOOD PRESSURE: 88 MMHG | RESPIRATION RATE: 16 BRPM | WEIGHT: 162 LBS | HEART RATE: 80 BPM | SYSTOLIC BLOOD PRESSURE: 144 MMHG | TEMPERATURE: 98.6 F | BODY MASS INDEX: 26.99 KG/M2 | OXYGEN SATURATION: 97 %

## 2022-07-15 DIAGNOSIS — Z79.01 LONG TERM (CURRENT) USE OF ANTICOAGULANTS: ICD-10-CM

## 2022-07-15 DIAGNOSIS — I48.91 UNSPECIFIED ATRIAL FIBRILLATION: ICD-10-CM

## 2022-07-15 DIAGNOSIS — Z00.00 ENCOUNTER FOR GENERAL ADULT MEDICAL EXAMINATION WITHOUT ABNORMAL FINDINGS: ICD-10-CM

## 2022-07-15 PROCEDURE — G0463: CPT

## 2022-07-15 PROCEDURE — 93005 ELECTROCARDIOGRAM TRACING: CPT

## 2022-07-15 PROCEDURE — 93010 ELECTROCARDIOGRAM REPORT: CPT

## 2022-07-16 LAB
INR PPP: 3.2 RATIO
POCT-PROTHROMBIN TIME: 37.9 SECS

## 2022-07-20 ENCOUNTER — OUTPATIENT (OUTPATIENT)
Dept: OUTPATIENT SERVICES | Facility: HOSPITAL | Age: 62
LOS: 1 days | End: 2022-07-20
Payer: COMMERCIAL

## 2022-07-20 ENCOUNTER — APPOINTMENT (OUTPATIENT)
Dept: FAMILY MEDICINE | Facility: HOSPITAL | Age: 62
End: 2022-07-20

## 2022-07-20 ENCOUNTER — RESULT CHARGE (OUTPATIENT)
Age: 62
End: 2022-07-20

## 2022-07-20 VITALS
SYSTOLIC BLOOD PRESSURE: 121 MMHG | RESPIRATION RATE: 17 BRPM | HEART RATE: 61 BPM | DIASTOLIC BLOOD PRESSURE: 51 MMHG | OXYGEN SATURATION: 99 % | TEMPERATURE: 98.1 F

## 2022-07-20 DIAGNOSIS — Z00.00 ENCOUNTER FOR GENERAL ADULT MEDICAL EXAMINATION WITHOUT ABNORMAL FINDINGS: ICD-10-CM

## 2022-07-20 LAB — HBA1C MFR BLD HPLC: 1.9

## 2022-07-20 PROCEDURE — G0463: CPT

## 2022-07-20 RX ORDER — WARFARIN 5 MG/1
5 TABLET ORAL
Qty: 30 | Refills: 0 | Status: COMPLETED | COMMUNITY
Start: 2022-05-16 | End: 2022-07-20

## 2022-07-20 RX ORDER — WARFARIN 2 MG/1
2 TABLET ORAL DAILY
Qty: 30 | Refills: 3 | Status: COMPLETED | COMMUNITY
Start: 2022-06-14 | End: 2022-07-20

## 2022-07-20 NOTE — PHYSICAL EXAM
[Normal Sclera/Conjunctiva] : normal sclera/conjunctiva [No JVD] : no jugular venous distention [Normal Rate] : normal rate  [S4] : an S4 was heard [I] : a grade 1 [___+] : [unfilled]U+ pretibial pitting edema on the left [Coordination Grossly Intact] : coordination grossly intact [No Focal Deficits] : no focal deficits [Normal] : affect was normal and insight and judgment were intact

## 2022-07-26 NOTE — REVIEW OF SYSTEMS
[Lower Ext Edema] : lower extremity edema [Negative] : Heme/Lymph [Palpitations] : no palpitations [Paroxysmal Nocturnal Dyspnea] : no paroxysmal nocturnal dyspnea

## 2022-07-26 NOTE — HISTORY OF PRESENT ILLNESS
[de-identified] : 63 yo F with PMHx of HFmrEF, Afib on coumadin presents for INR check. Has been seen by cardiology, cardioversion has been deferred 2/2 subtherapeutic INR. Pt is taking Coumadin 6 mg qHS. Upon questioning of other medications which she brought, pt reports not taking Metoprolol anymore for unknown reason. Pt complaining of lower ext edema, R more than L. Denies sob, orthopnea.

## 2022-07-27 DIAGNOSIS — I48.91 UNSPECIFIED ATRIAL FIBRILLATION: ICD-10-CM

## 2022-07-27 DIAGNOSIS — R60.0 LOCALIZED EDEMA: ICD-10-CM

## 2022-07-27 DIAGNOSIS — Z79.01 LONG TERM (CURRENT) USE OF ANTICOAGULANTS: ICD-10-CM

## 2022-07-28 NOTE — REVIEW OF SYSTEMS
[Lower Ext Edema] : lower extremity edema [Fever] : no fever [Chills] : no chills [Chest Pain] : no chest pain [Palpitations] : no palpitations [Orthopnea] : no orthopnea [Shortness Of Breath] : no shortness of breath [Wheezing] : no wheezing [Cough] : no cough [Abdominal Pain] : no abdominal pain

## 2022-07-28 NOTE — HISTORY OF PRESENT ILLNESS
[FreeTextEntry1] : Cardiology clinic  [de-identified] : Patient is a 61 y/o F with a PMH of Afib, HTN, and chronic bilateral lower extremity edema presenting to cardiology clinic to discuss being switched from warfarin to either xarelto or eliquis due to difficulty maintaining INR on warfarin. Most recent INR from 7/14/22 was 3.2. Cardioversion was deemed to be more beneficial for the patient instead of medications and was discussed at length with the patient and her daughter. Patient wanted time to discuss the procedure with her daughters and sister. At today's visit she denied any chest pain, palpitations, or shortness of breath.

## 2022-07-28 NOTE — PLAN
[FreeTextEntry1] : Above discussed with Dr. Dubois. \par Return to cardio clinic in 2-3 months to re-discuss plans for cardioversion.

## 2022-07-28 NOTE — PHYSICAL EXAM
[Normal] : no acute distress, well nourished, well developed and well-appearing [No Respiratory Distress] : no respiratory distress  [No Accessory Muscle Use] : no accessory muscle use [Clear to Auscultation] : lungs were clear to auscultation bilaterally [de-identified] : irregularly ireegular; normal JVP

## 2022-07-29 ENCOUNTER — RESULT CHARGE (OUTPATIENT)
Age: 62
End: 2022-07-29

## 2022-07-29 ENCOUNTER — APPOINTMENT (OUTPATIENT)
Dept: FAMILY MEDICINE | Facility: HOSPITAL | Age: 62
End: 2022-07-29

## 2022-07-29 ENCOUNTER — OUTPATIENT (OUTPATIENT)
Dept: OUTPATIENT SERVICES | Facility: HOSPITAL | Age: 62
LOS: 1 days | End: 2022-07-29
Payer: COMMERCIAL

## 2022-07-29 VITALS
OXYGEN SATURATION: 100 % | BODY MASS INDEX: 26.63 KG/M2 | HEART RATE: 71 BPM | TEMPERATURE: 98 F | WEIGHT: 160 LBS | SYSTOLIC BLOOD PRESSURE: 128 MMHG | DIASTOLIC BLOOD PRESSURE: 80 MMHG | RESPIRATION RATE: 18 BRPM

## 2022-07-29 DIAGNOSIS — Z79.01 LONG TERM (CURRENT) USE OF ANTICOAGULANTS: ICD-10-CM

## 2022-07-29 DIAGNOSIS — Z00.00 ENCOUNTER FOR GENERAL ADULT MEDICAL EXAMINATION WITHOUT ABNORMAL FINDINGS: ICD-10-CM

## 2022-07-29 DIAGNOSIS — I48.91 UNSPECIFIED ATRIAL FIBRILLATION: ICD-10-CM

## 2022-07-29 PROCEDURE — G0463: CPT

## 2022-07-29 NOTE — PHYSICAL EXAM
[No Acute Distress] : no acute distress [Well Nourished] : well nourished [Well Developed] : well developed [Well-Appearing] : well-appearing [No JVD] : no jugular venous distention [No Lymphadenopathy] : no lymphadenopathy [Supple] : supple [Thyroid Normal, No Nodules] : the thyroid was normal and there were no nodules present [No Respiratory Distress] : no respiratory distress  [No Accessory Muscle Use] : no accessory muscle use [Clear to Auscultation] : lungs were clear to auscultation bilaterally [Normal Rate] : normal rate  [Regular Rhythm] : with a regular rhythm [Normal S1, S2] : normal S1 and S2 [No Murmur] : no murmur heard [Pedal Pulses Present] : the pedal pulses are present [de-identified] : improved bilateral edema +1 [de-identified] : improved bilateral ankle swelling

## 2022-07-29 NOTE — ASSESSMENT
[FreeTextEntry1] : #A-fib (427.31) (I48.91)\par  · -continue Metoprolol succinate 25 mg qD, rate controlled in office\par     -FU with cardio for re evaluation of possible cardioversion\par \par #HFmrEF\par #Edema of extremities\par  · -Continue Lasix 20 mg 3 x week (MWF)\par    - COntnue Metoprolol succinate 25 mg \par     - COntinue Losartan/ HCTZ\par \par #Anticoagulated on Coumadin (V58.61) (Z79.01)\par  · -INR 2.5 today\par    -instructed to continue current regimen.  6 mg daily except 7 mg wednesday and saturday night (clear\par     written instructions given)\par     -FU in 1 week for INR check\par \par #Cough\par - mAy be related to asthma vs URI\par - Covid 19 swab ordered\par - Patient on Albuterol PRN\par - Has upcoming  appointment with pulmonologist \par \par -FU in 1 week for INR check/ Cough follow up\par \par d/w Dr. Rausch\par

## 2022-07-29 NOTE — HISTORY OF PRESENT ILLNESS
[FreeTextEntry1] : INR check [de-identified] : 61 yo F with PMHx of HFmrEF, Afib on coumadin presents for INR check. Has been seen by cardiology, cardioversion has been deferred 2/2 subtherapeutic INR. Pt is taking Coumadin 6 mg qHS except on Wednesdays and Saturdays where she takes 7mg Patient also states she has a cough but denies fevers, chills, sick contacts, Chest pain, SOB.\par

## 2022-08-05 ENCOUNTER — APPOINTMENT (OUTPATIENT)
Dept: FAMILY MEDICINE | Facility: HOSPITAL | Age: 62
End: 2022-08-05

## 2022-08-05 ENCOUNTER — RESULT CHARGE (OUTPATIENT)
Age: 62
End: 2022-08-05

## 2022-08-05 ENCOUNTER — OUTPATIENT (OUTPATIENT)
Dept: OUTPATIENT SERVICES | Facility: HOSPITAL | Age: 62
LOS: 1 days | End: 2022-08-05
Payer: COMMERCIAL

## 2022-08-05 VITALS
TEMPERATURE: 98 F | SYSTOLIC BLOOD PRESSURE: 124 MMHG | HEART RATE: 74 BPM | DIASTOLIC BLOOD PRESSURE: 79 MMHG | OXYGEN SATURATION: 99 % | RESPIRATION RATE: 17 BRPM

## 2022-08-05 DIAGNOSIS — Z12.11 ENCOUNTER FOR SCREENING FOR MALIGNANT NEOPLASM OF COLON: ICD-10-CM

## 2022-08-05 DIAGNOSIS — M84.452A PATHOLOGICAL FRACTURE, LEFT FEMUR, INITIAL ENCOUNTER FOR FRACTURE: ICD-10-CM

## 2022-08-05 DIAGNOSIS — M79.605 PAIN IN LEFT LEG: ICD-10-CM

## 2022-08-05 DIAGNOSIS — Z00.00 ENCOUNTER FOR GENERAL ADULT MEDICAL EXAMINATION WITHOUT ABNORMAL FINDINGS: ICD-10-CM

## 2022-08-05 DIAGNOSIS — Z29.9 ENCOUNTER FOR PROPHYLACTIC MEASURES, UNSPECIFIED: ICD-10-CM

## 2022-08-05 DIAGNOSIS — Z12.39 ENCOUNTER FOR OTHER SCREENING FOR MALIGNANT NEOPLASM OF BREAST: ICD-10-CM

## 2022-08-05 DIAGNOSIS — R79.1 ABNORMAL COAGULATION PROFILE: ICD-10-CM

## 2022-08-05 DIAGNOSIS — Z12.4 ENCOUNTER FOR SCREENING FOR MALIGNANT NEOPLASM OF CERVIX: ICD-10-CM

## 2022-08-05 DIAGNOSIS — M25.562 PAIN IN LEFT KNEE: ICD-10-CM

## 2022-08-05 DIAGNOSIS — M79.604 PAIN IN RIGHT LEG: ICD-10-CM

## 2022-08-05 LAB
POC PERFORMING LOCATION: NORMAL
POCCOVIDPCR: NEGATIVE
SARS-COV-2 RNA CT RESP QN NAA+PROBE: NEGATIVE

## 2022-08-05 PROCEDURE — G0463: CPT

## 2022-08-05 RX ORDER — WARFARIN 1 MG/1
1 TABLET ORAL DAILY
Qty: 30 | Refills: 0 | Status: COMPLETED | COMMUNITY
Start: 2022-05-27 | End: 2022-08-05

## 2022-08-05 RX ORDER — WARFARIN 5 MG/1
5 TABLET ORAL DAILY
Qty: 90 | Refills: 3 | Status: COMPLETED | COMMUNITY
Start: 2022-06-14 | End: 2022-08-05

## 2022-08-05 NOTE — PHYSICAL EXAM
[Normal] : normal rate, regular rhythm, normal S1 and S2 and no murmur heard [No Carotid Bruits] : no carotid bruits [No Varicosities] : no varicosities [No Edema] : there was no peripheral edema [No Extremity Clubbing/Cyanosis] : no extremity clubbing/cyanosis [Soft] : abdomen soft [Non Tender] : non-tender [Non-distended] : non-distended [No HSM] : no HSM

## 2022-08-09 ENCOUNTER — NON-APPOINTMENT (OUTPATIENT)
Age: 62
End: 2022-08-09

## 2022-08-09 NOTE — REVIEW OF SYSTEMS
[Negative] : Musculoskeletal [Chest Pain] : no chest pain [Palpitations] : no palpitations [Lower Ext Edema] : no lower extremity edema [Shortness Of Breath] : no shortness of breath [Dyspnea on Exertion] : no dyspnea on exertion

## 2022-08-09 NOTE — HISTORY OF PRESENT ILLNESS
[FreeTextEntry1] : IRN check [de-identified] : 63 yo F with PMHx of HFmrEF, Afib on coumadin presents for INR check. Has been seen by cardiology, cardioversion has been deferred 2/2 subtherapeutic INR. Pt is taking Coumadin 6 mg qHS except on Wednesdays and Saturdays where she takes 7mg Patient also states she has a cough but denies fevers, chills, sick contacts, Chest pain, SOB.\par \par INR in office today: 3.7

## 2022-08-10 NOTE — PROVIDER CONTACT NOTE (OTHER) - SITUATION
Pt c/o chest tightness. HR observed dropped to 46. Nonsustaining.
RN observed HR drop to 49, nonsustaining.
RN observed HR drop to 35, HR now sustaining in 40s.
negative - No discharge, No redness

## 2022-08-12 ENCOUNTER — APPOINTMENT (OUTPATIENT)
Dept: FAMILY MEDICINE | Facility: HOSPITAL | Age: 62
End: 2022-08-12

## 2022-08-17 ENCOUNTER — RESULT CHARGE (OUTPATIENT)
Age: 62
End: 2022-08-17

## 2022-08-17 ENCOUNTER — APPOINTMENT (OUTPATIENT)
Dept: FAMILY MEDICINE | Facility: HOSPITAL | Age: 62
End: 2022-08-17

## 2022-08-17 VITALS
DIASTOLIC BLOOD PRESSURE: 77 MMHG | WEIGHT: 162 LBS | HEIGHT: 65 IN | OXYGEN SATURATION: 99 % | RESPIRATION RATE: 17 BRPM | TEMPERATURE: 97.6 F | SYSTOLIC BLOOD PRESSURE: 135 MMHG | HEART RATE: 70 BPM | BODY MASS INDEX: 26.99 KG/M2

## 2022-08-23 ENCOUNTER — RX RENEWAL (OUTPATIENT)
Age: 62
End: 2022-08-23

## 2022-08-24 LAB
INR PPP: 3.3 RATIO
POCT-PROTHROMBIN TIME: 39.3 SECS

## 2022-08-24 NOTE — HISTORY OF PRESENT ILLNESS
[FreeTextEntry1] : INR check [de-identified] : 63 yo F with PMHx of HFmrEF, Afib on coumadin presents for INR check. Has been seen by cardiology, cardioversion has been deferred 2/2 subtherapeutic INR. Pt is taking Coumadin 6 mg qHS daily. Otherwise no complaints. Denies fevers, chills, n/v, cough, sick contacts, SOB, CP, urinary /bowel changes.\par \par

## 2022-08-24 NOTE — PLAN
[FreeTextEntry1] : #A-fib (427.31) (I48.91)\par  -continue Metoprolol succinate 25 mg qD, rate controlled in office\par  - FU with cardio for re evaluation of possible cardioversion vs switching to Eliquis ( covered by insurance) as patient continues to have difficulty following Coumadin regimen. \par - Patients recent ECHO with severe left atrium enlargement and thus may not be candidate for cardioversion. Pt to be re-evaluated by cardio. \par \par #HFmrEF\par #Edema of extremities\par  · -Continue Lasix 20 mg 3 x week (MWF)\par  - COntnue Metoprolol succinate 25 mg \par  - COntinue Losartan/ HCTZ\par \par #Anticoagulated on Coumadin (V58.61) (Z79.01)\par  · -INR 3.3 today\par - Old regimen: 6 mg daily coumadin \par - New regimen: 6 mg daily except 5mg on Tuesdays and Thursdays. Instructions written on paper and given to patient)\par \par -FU in 2 week for INR check. ( patient cannot come in 1 week)\par - Follow up with cardio. \par \par d/w Dr. Lozoya\par

## 2022-08-24 NOTE — PHYSICAL EXAM
[No Acute Distress] : no acute distress [Well Nourished] : well nourished [Well Developed] : well developed [Well-Appearing] : well-appearing [No JVD] : no jugular venous distention [No Lymphadenopathy] : no lymphadenopathy [Supple] : supple [Thyroid Normal, No Nodules] : the thyroid was normal and there were no nodules present [No Respiratory Distress] : no respiratory distress  [No Accessory Muscle Use] : no accessory muscle use [Clear to Auscultation] : lungs were clear to auscultation bilaterally [Normal Rate] : normal rate  [Regular Rhythm] : with a regular rhythm [Normal S1, S2] : normal S1 and S2 [No Murmur] : no murmur heard [Pedal Pulses Present] : the pedal pulses are present [de-identified] : improved bilateral edema +1

## 2022-08-24 NOTE — REVIEW OF SYSTEMS
[Negative] : Heme/Lymph [Shortness Of Breath] : no shortness of breath [Wheezing] : no wheezing [Cough] : no cough [Dyspnea on Exertion] : no dyspnea on exertion

## 2022-08-31 ENCOUNTER — OUTPATIENT (OUTPATIENT)
Dept: OUTPATIENT SERVICES | Facility: HOSPITAL | Age: 62
LOS: 1 days | End: 2022-08-31
Payer: COMMERCIAL

## 2022-08-31 ENCOUNTER — APPOINTMENT (OUTPATIENT)
Dept: FAMILY MEDICINE | Facility: HOSPITAL | Age: 62
End: 2022-08-31

## 2022-08-31 ENCOUNTER — RESULT CHARGE (OUTPATIENT)
Age: 62
End: 2022-08-31

## 2022-08-31 VITALS
DIASTOLIC BLOOD PRESSURE: 83 MMHG | SYSTOLIC BLOOD PRESSURE: 119 MMHG | BODY MASS INDEX: 25.96 KG/M2 | OXYGEN SATURATION: 99 % | WEIGHT: 156 LBS | HEART RATE: 66 BPM | RESPIRATION RATE: 16 BRPM | TEMPERATURE: 98.3 F

## 2022-08-31 DIAGNOSIS — Z00.00 ENCOUNTER FOR GENERAL ADULT MEDICAL EXAMINATION WITHOUT ABNORMAL FINDINGS: ICD-10-CM

## 2022-08-31 PROCEDURE — G0463: CPT

## 2022-09-01 DIAGNOSIS — Z79.01 LONG TERM (CURRENT) USE OF ANTICOAGULANTS: ICD-10-CM

## 2022-09-03 LAB
INR PPP: 3.6 RATIO
POCT-PROTHROMBIN TIME: 43.6 SECS

## 2022-09-07 ENCOUNTER — APPOINTMENT (OUTPATIENT)
Dept: FAMILY MEDICINE | Facility: HOSPITAL | Age: 62
End: 2022-09-07

## 2022-09-08 NOTE — PHYSICAL EXAM
[No Acute Distress] : no acute distress [Well Nourished] : well nourished [Well Developed] : well developed [Well-Appearing] : well-appearing [No JVD] : no jugular venous distention [No Lymphadenopathy] : no lymphadenopathy [Supple] : supple [Thyroid Normal, No Nodules] : the thyroid was normal and there were no nodules present [No Respiratory Distress] : no respiratory distress  [No Accessory Muscle Use] : no accessory muscle use [Clear to Auscultation] : lungs were clear to auscultation bilaterally [Normal Rate] : normal rate  [Regular Rhythm] : with a regular rhythm [Normal S1, S2] : normal S1 and S2 [No Murmur] : no murmur heard [Pedal Pulses Present] : the pedal pulses are present [de-identified] : improved bilateral edema +1

## 2022-09-08 NOTE — HISTORY OF PRESENT ILLNESS
[FreeTextEntry1] : INR check [de-identified] : 61 yo F with PMHx of HFmrEF, Afib on coumadin presents for INR check. Pt currently taking Coumadin 5 mg qHS daily except Tuesdays and Thursdays where she takes 5 mg. Otherwise no complaints. Denies fevers, chills, n/v, cough, sick contacts, SOB, CP, urinary /bowel changes.\par

## 2022-09-08 NOTE — PLAN
[FreeTextEntry1] : #A-fib (427.31) (I48.91)\par  -continue Metoprolol succinate 25 mg qD, rate controlled in office\par  - FU with cardio for re evaluation of possible cardioversion vs switching to Eliquis ( covered by insurance) as patient continues to have difficulty following Coumadin regimen. \par - Patients recent ECHO with severe left atrium enlargement and thus may not be candidate for cardioversion. Pt to be re-evaluated by cardio. \par \par #HFmrEF\par #Edema of extremities\par  · -Continue Lasix 20 mg 3 x week (MWF)\par  - COntnue Metoprolol succinate 25 mg \par  - COntinue Losartan/ HCTZ\par \par #Anticoagulated on Coumadin (V58.61) (Z79.01)\par  · -INR 3.6 today\par - Old regimen:  6 mg daily except 5mg on Tuesdays and Thursdays. Patient was taking 5 mg daily with 6mg on tuesdays and thursdays. \par - New regimen: 5 mg daily to make it easier on patient\par \par -FU in 1  week for INR check\par - Follow up with cardio. \par \par d/w Dr. Pineda\par

## 2022-09-16 ENCOUNTER — APPOINTMENT (OUTPATIENT)
Dept: CARDIOLOGY | Facility: HOSPITAL | Age: 62
End: 2022-09-16

## 2022-09-17 NOTE — ED PROVIDER NOTE - CONSTITUTIONAL NEGATIVE STATEMENT, MLM
no fever and no chills. CONSTITUTIONAL: well-appearing, in NAD  SKIN: Warm dry, normal skin turgor  HEAD: NCAT  EYES: EOMI, PERRLA, no scleral icterus, conjunctiva pin  RESP: clear to ausculation b/l. No crackles or wheezing.  ABD: soft, non-tender, non-distended, no rebound or guarding.  MSK: No pedal edema, no calf tenderness  NEURO: normal motor. normal sensory. Normal gait.  PSYCH: Cooperative, appropriate.

## 2022-09-19 ENCOUNTER — APPOINTMENT (OUTPATIENT)
Dept: PULMONOLOGY | Facility: CLINIC | Age: 62
End: 2022-09-19

## 2022-09-19 VITALS
BODY MASS INDEX: 26.06 KG/M2 | SYSTOLIC BLOOD PRESSURE: 119 MMHG | HEART RATE: 76 BPM | TEMPERATURE: 97.8 F | WEIGHT: 166 LBS | OXYGEN SATURATION: 98 % | RESPIRATION RATE: 16 BRPM | DIASTOLIC BLOOD PRESSURE: 80 MMHG | HEIGHT: 67 IN

## 2022-09-19 DIAGNOSIS — Z82.49 FAMILY HISTORY OF ISCHEMIC HEART DISEASE AND OTHER DISEASES OF THE CIRCULATORY SYSTEM: ICD-10-CM

## 2022-09-19 PROCEDURE — 94727 GAS DIL/WSHOT DETER LNG VOL: CPT

## 2022-09-19 PROCEDURE — 94618 PULMONARY STRESS TESTING: CPT

## 2022-09-19 PROCEDURE — 95012 NITRIC OXIDE EXP GAS DETER: CPT

## 2022-09-19 PROCEDURE — 94729 DIFFUSING CAPACITY: CPT

## 2022-09-19 PROCEDURE — 99204 OFFICE O/P NEW MOD 45 MIN: CPT | Mod: 25

## 2022-09-19 PROCEDURE — 71046 X-RAY EXAM CHEST 2 VIEWS: CPT

## 2022-09-19 PROCEDURE — 94010 BREATHING CAPACITY TEST: CPT

## 2022-09-19 NOTE — PHYSICAL EXAM
[No Acute Distress] : no acute distress [Normal Oropharynx] : normal oropharynx [Normal Appearance] : normal appearance [No Neck Mass] : no neck mass [Normal Rate/Rhythm] : normal rate/rhythm [Normal S1, S2] : normal s1, s2 [No Murmurs] : no murmurs [No Resp Distress] : no resp distress [Clear to Auscultation Bilaterally] : clear to auscultation bilaterally [No Abnormalities] : no abnormalities [Benign] : benign [Normal Gait] : normal gait [No Clubbing] : no clubbing [No Cyanosis] : no cyanosis [No Edema] : no edema [FROM] : FROM [Normal Color/ Pigmentation] : normal color/ pigmentation [No Focal Deficits] : no focal deficits [Oriented x3] : oriented x3 [Normal Affect] : normal affect [III] : Mallampati Class: III [TextBox_2] : ow  [TextBox_68] : I:E ratio 1:3; clear

## 2022-09-19 NOTE — ADDENDUM
[FreeTextEntry1] : Documented by Gio Tirado acting as a scribe for Dr. Seth Curtis on 09/19/2022 .\par \par All medical record entries made by the Scribe were at my, Dr. Seth Curtis's, direction and personally dictated by me on. I have reviewed the chart and agree that the record accurately reflects my personal performance of the history, physical exam, assessment and plan. I have also personally directed, reviewed, and agree with the discharge instructions.

## 2022-09-19 NOTE — HISTORY OF PRESENT ILLNESS
[TextBox_4] : Ms. CHAMPION is a 62 year old female with a history of a fib, grade 3 distolic dysfunction, positive messi, asthma presenting to the office today for initial pulmonary evaluation. Her chief complaint is sob, mild persistent asthma, ?allergies, ?JULIANNA \par -she notes having asthma since she was a little girl \par -she notes when she was away when her asthmatic issues came about\par -she notes pnd sometimes \par -she notes having swelling and was given water pills \par -she notes never getting covid-19 and received 3x vaccine \par -she notes stable weight (166 lbs) \par -she notes sob \par -she notes vision is alright but sometimes blurry \par -she notes being able to sleep to boring show \par -she notes not being able to sleep to boring show \par -she denies palpitations \par -she notes memory and concentration are normal \par -she notes her complaints are related to her job \par -she notes her sleep was sleep but sometimes its a bit off \par \par -she denies any headaches, nausea, vomiting, fever, chills, sweats, chest pain, chest pressure, diarrhea, constipation, dysphagia, dizziness, leg swelling, leg pain, itchy eyes, itchy ears, heartburn, reflux, sour taste in the mouth, myalgias or arthralgias.

## 2022-09-19 NOTE — ASSESSMENT
[FreeTextEntry1] : Ms. CHAMPION is a 62 year old female with a history of a fib, grade 3 distolic dysfunction, positive messi, asthma presenting to the office today for initial pulmonary evaluation. Her chief complaint is sob, mild persistent asthma, ?allergies, ?JULIANNA \par \par Her shortness of breath is multifactorial due to:\par -poor mechanics of breathing \par -out of shape / overweight\par -pulmonary disease\par    -Full PFTs to follow\par -cardiac disease \par \par Problem 1: Mild persistent Asthma\par - add Albuterol 0.83% via nebulizer, Q6H \par -add Trelegy 100 1 puff QD \par Asthma is  believed to be caused by inherited (genetic) and environmental factor, but its exact cause is unknown. Asthma may be triggered by allergens, lung infections, or irritants in the air. Asthma triggers are different for each person \par Inhaler technique reviewed as well as oral hygiene techniques reviewed with patient. Avoidance of cold air, extremes of temperature, rescue inhaler should be used before exercise. Order of medication reviewed with patient. Recommended use of a cool mist humidifier in the bedroom. \par \par Problem 2: ?Allergies \par Environmental measures for allergies were encouraged including mattress and pillow covers, air purifier, and environmental controls. \par \par Problem 3: ?JULIANNA (mallampati class, A fib,)\par -Complete home sleep study \par Sleep apnea is associated with adverse clinical consequences which can affect most organ systems.  Cardiovascular disease risk includes arrhythmias, atrial fibrillation, hypertension, coronary artery disease, and stroke. Metabolic disorders include diabetes type 2, non-alcoholic fatty liver disease. Mood disorder especially depression; and cognitive decline especially in the elderly. Associations with  chronic reflux/Wilson’s esophagus some but not all inclusive. \par -Reasons  include arousal consistent with hypopnea; respiratory events most prominent in REM sleep or supine position; therefore sleep staging and body position are important for accurate diagnosis and estimation of AHI. \par \par problem : cardiac disease\par -recommended to continue to follow up with Cardiologist \par \par problem : poor breathing mechanics\par -Proper breathing techniques were reviewed with an emphasis of exhalation. Patient instructed to breath in for 1 second and out for four seconds. Patient was encouraged to not talk while walking. \par \par problem : out of shape / overweight\par -Weight loss, exercise, and diet control were discussed and are highly encouraged. Treatment options were given such as, aqua therapy, and contacting a nutritionist. Recommended to use the elliptical, stationary bike, less use of treadmill. Mindful eating was explained to the patient Obesity is associated with worsening asthma, shortness of breath, and potential for cardiac disease, diabetes, and other underlying medical conditions\par \par problem : health maintenance \par -recommended yearly flu shot after October 15\par -recommended strep pneumonia vaccines: Prevnar-13 vaccine, followed by Pneumo vaccine 23 one year following after 65\par -recommended early intervention for Upper Respiratory Infections (URIs)\par -recommended regular osteoporosis evaluations\par -recommended early dermatological evaluations\par -recommended after the age of 50 to the age of 70, colonoscopy every 5 years\par \par F/U in 6-8 weeks.\par She is encouraged to call with any changes, concerns, or questions

## 2022-09-21 ENCOUNTER — LABORATORY RESULT (OUTPATIENT)
Age: 62
End: 2022-09-21

## 2022-09-21 LAB
25(OH)D3 SERPL-MCNC: 19.8 NG/ML
BASOPHILS # BLD AUTO: 0.02 K/UL
BASOPHILS NFR BLD AUTO: 0.3 %
EOSINOPHIL # BLD AUTO: 0.28 K/UL
EOSINOPHIL NFR BLD AUTO: 4.7 %
HCT VFR BLD CALC: 35.1 %
HGB BLD-MCNC: 11.7 G/DL
IMM GRANULOCYTES NFR BLD AUTO: 0.3 %
LYMPHOCYTES # BLD AUTO: 2.27 K/UL
LYMPHOCYTES NFR BLD AUTO: 38.5 %
MAN DIFF?: NORMAL
MCHC RBC-ENTMCNC: 28.3 PG
MCHC RBC-ENTMCNC: 33.3 GM/DL
MCV RBC AUTO: 85 FL
MONOCYTES # BLD AUTO: 0.6 K/UL
MONOCYTES NFR BLD AUTO: 10.2 %
NEUTROPHILS # BLD AUTO: 2.71 K/UL
NEUTROPHILS NFR BLD AUTO: 46 %
PLATELET # BLD AUTO: 211 K/UL
RBC # BLD: 4.13 M/UL
RBC # FLD: 13.7 %
WBC # FLD AUTO: 5.9 K/UL

## 2022-09-22 LAB
24R-OH-CALCIDIOL SERPL-MCNC: 25.9 PG/ML
A ALTERNATA IGE QN: 0.18 KUA/L
A ALTERNATA IGE QN: 0.18 KUA/L
A FUMIGATUS IGE QN: 0.71 KUA/L
A FUMIGATUS IGE QN: 0.71 KUA/L
BERMUDA GRASS IGE QN: 1.58 KUA/L
BOXELDER IGE QN: 0.83 KUA/L
C ALBICANS IGE QN: 1.11 KUA/L
C HERBARUM IGE QN: <0.1 KUA/L
C HERBARUM IGE QN: <0.1 KUA/L
CALIF WALNUT IGE QN: NORMAL
CAT DANDER IGE QN: 0.74 KUA/L
CAT DANDER IGE QN: 0.74 KUA/L
CLAM IGE QN: <0.1 KUA/L
CMN PIGWEED IGE QN: 0.77 KUA/L
CODFISH IGE QN: <0.1 KUA/L
COMMON RAGWEED IGE QN: 5.1 KUA/L
COMMON RAGWEED IGE QN: 5.1 KUA/L
CORN IGE QN: 0.48 KUA/L
COTTONWOOD IGE QN: 0.57 KUA/L
COW MILK IGE QN: 0.19 KUA/L
D FARINAE IGE QN: 2.74 KUA/L
D FARINAE IGE QN: 2.74 KUA/L
D PTERONYSS IGE QN: 2.64 KUA/L
D PTERONYSS IGE QN: 2.64 KUA/L
DEPRECATED A ALTERNATA IGE RAST QL: NORMAL
DEPRECATED A ALTERNATA IGE RAST QL: NORMAL
DEPRECATED A FUMIGATUS IGE RAST QL: 2
DEPRECATED A FUMIGATUS IGE RAST QL: 2
DEPRECATED BERMUDA GRASS IGE RAST QL: 2
DEPRECATED BOXELDER IGE RAST QL: 2
DEPRECATED C ALBICANS IGE RAST QL: 2
DEPRECATED C HERBARUM IGE RAST QL: 0
DEPRECATED C HERBARUM IGE RAST QL: 0
DEPRECATED CAT DANDER IGE RAST QL: 2
DEPRECATED CAT DANDER IGE RAST QL: 2
DEPRECATED CLAM IGE RAST QL: 0
DEPRECATED CODFISH IGE RAST QL: 0
DEPRECATED COMMON PIGWEED IGE RAST QL: 2
DEPRECATED COMMON RAGWEED IGE RAST QL: 3
DEPRECATED COMMON RAGWEED IGE RAST QL: 3
DEPRECATED CORN IGE RAST QL: 1
DEPRECATED COTTONWOOD IGE RAST QL: 1
DEPRECATED COW MILK IGE RAST QL: NORMAL
DEPRECATED D FARINAE IGE RAST QL: 2
DEPRECATED D FARINAE IGE RAST QL: 2
DEPRECATED D PTERONYSS IGE RAST QL: 2
DEPRECATED D PTERONYSS IGE RAST QL: 2
DEPRECATED DOG DANDER IGE RAST QL: 2
DEPRECATED DOG DANDER IGE RAST QL: 2
DEPRECATED EGG WHITE IGE RAST QL: 0
DEPRECATED GOOSE FEATHER IGE RAST QL: 0
DEPRECATED GOOSEFOOT IGE RAST QL: 0
DEPRECATED LONDON PLANE IGE RAST QL: NORMAL
DEPRECATED M RACEMOSUS IGE RAST QL: 0
DEPRECATED MOUSE URINE PROT IGE RAST QL: NORMAL
DEPRECATED MUGWORT IGE RAST QL: 1
DEPRECATED P NOTATUM IGE RAST QL: 0
DEPRECATED PEANUT IGE RAST QL: 2
DEPRECATED RED CEDAR IGE RAST QL: NORMAL
DEPRECATED ROACH IGE RAST QL: 0
DEPRECATED ROACH IGE RAST QL: 0
DEPRECATED SCALLOP IGE RAST QL: NORMAL
DEPRECATED SESAME SEED IGE RAST QL: 2
DEPRECATED SHEEP SORREL IGE RAST QL: NORMAL
DEPRECATED SHRIMP IGE RAST QL: NORMAL
DEPRECATED SILVER BIRCH IGE RAST QL: 2
DEPRECATED SOYBEAN IGE RAST QL: NORMAL
DEPRECATED TIMOTHY IGE RAST QL: NORMAL
DEPRECATED TIMOTHY IGE RAST QL: NORMAL
DEPRECATED WALNUT IGE RAST QL: NORMAL
DEPRECATED WHEAT IGE RAST QL: NORMAL
DEPRECATED WHITE ASH IGE RAST QL: NORMAL
DEPRECATED WHITE OAK IGE RAST QL: 1
DEPRECATED WHITE OAK IGE RAST QL: 1
DOG DANDER IGE QN: 1.23 KUA/L
DOG DANDER IGE QN: 1.23 KUA/L
EGG WHITE IGE QN: <0.1 KUA/L
GOOSE FEATHER IGE QN: <0.1 KUA/L
GOOSEFOOT IGE QN: <0.1 KUA/L
LONDON PLANE IGE QN: NORMAL
M RACEMOSUS IGE QN: <0.1 KUA/L
MOUSE URINE PROT IGE QN: NORMAL
MUGWORT IGE QN: 0.58 KUA/L
MULBERRY (T70) CLASS: NORMAL
MULBERRY (T70) CONC: NORMAL
P NOTATUM IGE QN: <0.1 KUA/L
PEANUT IGE QN: 2.05 KUA/L
RED CEDAR IGE QN: NORMAL
ROACH IGE QN: <0.1 KUA/L
ROACH IGE QN: <0.1 KUA/L
SCALLOP IGE QN: NORMAL
SCALLOP IGE QN: NORMAL
SESAME SEED IGE QN: 1.38 KUA/L
SHEEP SORREL IGE QN: NORMAL
SILVER BIRCH IGE QN: 2.06 KUA/L
SOYBEAN IGE QN: NORMAL
TIMOTHY IGE QN: NORMAL
TIMOTHY IGE QN: NORMAL
TOTAL IGE SMQN RAST: 174 KU/L
TREE ALLERG MIX1 IGE QL: NORMAL
WALNUT IGE QN: NORMAL
WHEAT IGE QN: NORMAL
WHITE ASH IGE QN: NORMAL
WHITE ELM IGE QN: 2
WHITE ELM IGE QN: 2.13 KUA/L
WHITE OAK IGE QN: 0.37 KUA/L
WHITE OAK IGE QN: 0.37 KUA/L

## 2022-09-27 ENCOUNTER — APPOINTMENT (OUTPATIENT)
Dept: PULMONOLOGY | Facility: CLINIC | Age: 62
End: 2022-09-27

## 2022-09-27 PROCEDURE — 94664 DEMO&/EVAL PT USE INHALER: CPT

## 2022-10-03 ENCOUNTER — EMERGENCY (EMERGENCY)
Facility: HOSPITAL | Age: 62
LOS: 1 days | Discharge: HOME | End: 2022-10-03
Attending: EMERGENCY MEDICINE
Payer: COMMERCIAL

## 2022-10-03 VITALS
RESPIRATION RATE: 16 BRPM | OXYGEN SATURATION: 94 % | TEMPERATURE: 98 F | HEIGHT: 65 IN | DIASTOLIC BLOOD PRESSURE: 66 MMHG | SYSTOLIC BLOOD PRESSURE: 95 MMHG | HEART RATE: 100 BPM

## 2022-10-03 LAB
ALBUMIN SERPL ELPH-MCNC: 4.2 G/DL — SIGNIFICANT CHANGE UP (ref 3.3–5)
ALP SERPL-CCNC: 90 U/L — SIGNIFICANT CHANGE UP (ref 40–120)
ALT FLD-CCNC: 12 U/L — SIGNIFICANT CHANGE UP (ref 10–45)
ANION GAP SERPL CALC-SCNC: 14 MMOL/L — SIGNIFICANT CHANGE UP (ref 5–17)
APTT BLD: 89.1 SEC — HIGH (ref 27.5–35.5)
AST SERPL-CCNC: 21 U/L — SIGNIFICANT CHANGE UP (ref 10–40)
BASOPHILS # BLD AUTO: 0.01 K/UL — SIGNIFICANT CHANGE UP (ref 0–0.2)
BASOPHILS # BLD AUTO: 0.02 K/UL — SIGNIFICANT CHANGE UP (ref 0–0.2)
BASOPHILS # BLD AUTO: 0.02 K/UL — SIGNIFICANT CHANGE UP (ref 0–0.2)
BASOPHILS NFR BLD AUTO: 0.1 % — SIGNIFICANT CHANGE UP (ref 0–2)
BASOPHILS NFR BLD AUTO: 0.2 % — SIGNIFICANT CHANGE UP (ref 0–2)
BASOPHILS NFR BLD AUTO: 0.3 % — SIGNIFICANT CHANGE UP (ref 0–2)
BILIRUB SERPL-MCNC: 0.8 MG/DL — SIGNIFICANT CHANGE UP (ref 0.2–1.2)
BUN SERPL-MCNC: 18 MG/DL — SIGNIFICANT CHANGE UP (ref 7–23)
CALCIUM SERPL-MCNC: 9.5 MG/DL — SIGNIFICANT CHANGE UP (ref 8.4–10.5)
CHLORIDE SERPL-SCNC: 97 MMOL/L — SIGNIFICANT CHANGE UP (ref 96–108)
CO2 SERPL-SCNC: 26 MMOL/L — SIGNIFICANT CHANGE UP (ref 22–31)
CREAT SERPL-MCNC: 0.92 MG/DL — SIGNIFICANT CHANGE UP (ref 0.5–1.3)
EGFR: 70 ML/MIN/1.73M2 — SIGNIFICANT CHANGE UP
EOSINOPHIL # BLD AUTO: 0.04 K/UL — SIGNIFICANT CHANGE UP (ref 0–0.5)
EOSINOPHIL # BLD AUTO: 0.04 K/UL — SIGNIFICANT CHANGE UP (ref 0–0.5)
EOSINOPHIL # BLD AUTO: 0.07 K/UL — SIGNIFICANT CHANGE UP (ref 0–0.5)
EOSINOPHIL NFR BLD AUTO: 0.4 % — SIGNIFICANT CHANGE UP (ref 0–6)
EOSINOPHIL NFR BLD AUTO: 0.5 % — SIGNIFICANT CHANGE UP (ref 0–6)
EOSINOPHIL NFR BLD AUTO: 0.9 % — SIGNIFICANT CHANGE UP (ref 0–6)
GLUCOSE SERPL-MCNC: 113 MG/DL — HIGH (ref 70–99)
HCT VFR BLD CALC: 29.8 % — LOW (ref 34.5–45)
HCT VFR BLD CALC: 30.3 % — LOW (ref 34.5–45)
HCT VFR BLD CALC: 35.8 % — SIGNIFICANT CHANGE UP (ref 34.5–45)
HGB BLD-MCNC: 10 G/DL — LOW (ref 11.5–15.5)
HGB BLD-MCNC: 10.1 G/DL — LOW (ref 11.5–15.5)
HGB BLD-MCNC: 11.8 G/DL — SIGNIFICANT CHANGE UP (ref 11.5–15.5)
IMM GRANULOCYTES NFR BLD AUTO: 0.3 % — SIGNIFICANT CHANGE UP (ref 0–0.9)
IMM GRANULOCYTES NFR BLD AUTO: 0.5 % — SIGNIFICANT CHANGE UP (ref 0–0.9)
IMM GRANULOCYTES NFR BLD AUTO: 0.5 % — SIGNIFICANT CHANGE UP (ref 0–0.9)
INR BLD: 16.57 RATIO — CRITICAL HIGH (ref 0.88–1.16)
LYMPHOCYTES # BLD AUTO: 0.92 K/UL — LOW (ref 1–3.3)
LYMPHOCYTES # BLD AUTO: 1.56 K/UL — SIGNIFICANT CHANGE UP (ref 1–3.3)
LYMPHOCYTES # BLD AUTO: 1.75 K/UL — SIGNIFICANT CHANGE UP (ref 1–3.3)
LYMPHOCYTES # BLD AUTO: 19.6 % — SIGNIFICANT CHANGE UP (ref 13–44)
LYMPHOCYTES # BLD AUTO: 22.7 % — SIGNIFICANT CHANGE UP (ref 13–44)
LYMPHOCYTES # BLD AUTO: 9 % — LOW (ref 13–44)
MCHC RBC-ENTMCNC: 27.8 PG — SIGNIFICANT CHANGE UP (ref 27–34)
MCHC RBC-ENTMCNC: 27.8 PG — SIGNIFICANT CHANGE UP (ref 27–34)
MCHC RBC-ENTMCNC: 28.1 PG — SIGNIFICANT CHANGE UP (ref 27–34)
MCHC RBC-ENTMCNC: 33 GM/DL — SIGNIFICANT CHANGE UP (ref 32–36)
MCHC RBC-ENTMCNC: 33.3 GM/DL — SIGNIFICANT CHANGE UP (ref 32–36)
MCHC RBC-ENTMCNC: 33.6 GM/DL — SIGNIFICANT CHANGE UP (ref 32–36)
MCV RBC AUTO: 82.8 FL — SIGNIFICANT CHANGE UP (ref 80–100)
MCV RBC AUTO: 83.5 FL — SIGNIFICANT CHANGE UP (ref 80–100)
MCV RBC AUTO: 85.2 FL — SIGNIFICANT CHANGE UP (ref 80–100)
MONOCYTES # BLD AUTO: 0.56 K/UL — SIGNIFICANT CHANGE UP (ref 0–0.9)
MONOCYTES # BLD AUTO: 0.59 K/UL — SIGNIFICANT CHANGE UP (ref 0–0.9)
MONOCYTES # BLD AUTO: 0.64 K/UL — SIGNIFICANT CHANGE UP (ref 0–0.9)
MONOCYTES NFR BLD AUTO: 5.5 % — SIGNIFICANT CHANGE UP (ref 2–14)
MONOCYTES NFR BLD AUTO: 7.4 % — SIGNIFICANT CHANGE UP (ref 2–14)
MONOCYTES NFR BLD AUTO: 8.3 % — SIGNIFICANT CHANGE UP (ref 2–14)
NEUTROPHILS # BLD AUTO: 5.19 K/UL — SIGNIFICANT CHANGE UP (ref 1.8–7.4)
NEUTROPHILS # BLD AUTO: 5.75 K/UL — SIGNIFICANT CHANGE UP (ref 1.8–7.4)
NEUTROPHILS # BLD AUTO: 8.59 K/UL — HIGH (ref 1.8–7.4)
NEUTROPHILS NFR BLD AUTO: 67.3 % — SIGNIFICANT CHANGE UP (ref 43–77)
NEUTROPHILS NFR BLD AUTO: 72.1 % — SIGNIFICANT CHANGE UP (ref 43–77)
NEUTROPHILS NFR BLD AUTO: 84.4 % — HIGH (ref 43–77)
NRBC # BLD: 0 /100 WBCS — SIGNIFICANT CHANGE UP (ref 0–0)
PLATELET # BLD AUTO: 267 K/UL — SIGNIFICANT CHANGE UP (ref 150–400)
PLATELET # BLD AUTO: 267 K/UL — SIGNIFICANT CHANGE UP (ref 150–400)
PLATELET # BLD AUTO: 276 K/UL — SIGNIFICANT CHANGE UP (ref 150–400)
POTASSIUM SERPL-MCNC: 3.2 MMOL/L — LOW (ref 3.5–5.3)
POTASSIUM SERPL-SCNC: 3.2 MMOL/L — LOW (ref 3.5–5.3)
PROT SERPL-MCNC: 7.8 G/DL — SIGNIFICANT CHANGE UP (ref 6–8.3)
PROTHROM AB SERPL-ACNC: 197.8 SEC — HIGH (ref 10.5–13.4)
RBC # BLD: 3.6 M/UL — LOW (ref 3.8–5.2)
RBC # BLD: 3.63 M/UL — LOW (ref 3.8–5.2)
RBC # BLD: 4.2 M/UL — SIGNIFICANT CHANGE UP (ref 3.8–5.2)
RBC # FLD: 13.3 % — SIGNIFICANT CHANGE UP (ref 10.3–14.5)
RBC # FLD: 13.4 % — SIGNIFICANT CHANGE UP (ref 10.3–14.5)
RBC # FLD: 13.4 % — SIGNIFICANT CHANGE UP (ref 10.3–14.5)
SODIUM SERPL-SCNC: 137 MMOL/L — SIGNIFICANT CHANGE UP (ref 135–145)
TROPONIN T, HIGH SENSITIVITY RESULT: 7 NG/L — SIGNIFICANT CHANGE UP (ref 0–51)
WBC # BLD: 10.18 K/UL — SIGNIFICANT CHANGE UP (ref 3.8–10.5)
WBC # BLD: 7.71 K/UL — SIGNIFICANT CHANGE UP (ref 3.8–10.5)
WBC # BLD: 7.97 K/UL — SIGNIFICANT CHANGE UP (ref 3.8–10.5)
WBC # FLD AUTO: 10.18 K/UL — SIGNIFICANT CHANGE UP (ref 3.8–10.5)
WBC # FLD AUTO: 7.71 K/UL — SIGNIFICANT CHANGE UP (ref 3.8–10.5)
WBC # FLD AUTO: 7.97 K/UL — SIGNIFICANT CHANGE UP (ref 3.8–10.5)

## 2022-10-03 PROCEDURE — 74177 CT ABD & PELVIS W/CONTRAST: CPT | Mod: 26,MG

## 2022-10-03 PROCEDURE — 85610 PROTHROMBIN TIME: CPT

## 2022-10-03 PROCEDURE — G1004: CPT

## 2022-10-03 PROCEDURE — 70450 CT HEAD/BRAIN W/O DYE: CPT | Mod: 26,MG

## 2022-10-03 PROCEDURE — 36415 COLL VENOUS BLD VENIPUNCTURE: CPT

## 2022-10-03 PROCEDURE — 99285 EMERGENCY DEPT VISIT HI MDM: CPT

## 2022-10-03 PROCEDURE — 73521 X-RAY EXAM HIPS BI 2 VIEWS: CPT | Mod: 26

## 2022-10-03 PROCEDURE — 73552 X-RAY EXAM OF FEMUR 2/>: CPT

## 2022-10-03 PROCEDURE — 70450 CT HEAD/BRAIN W/O DYE: CPT | Mod: MG

## 2022-10-03 PROCEDURE — 73562 X-RAY EXAM OF KNEE 3: CPT

## 2022-10-03 PROCEDURE — 71260 CT THORAX DX C+: CPT | Mod: MG

## 2022-10-03 PROCEDURE — 71046 X-RAY EXAM CHEST 2 VIEWS: CPT | Mod: 26

## 2022-10-03 PROCEDURE — 71046 X-RAY EXAM CHEST 2 VIEWS: CPT

## 2022-10-03 PROCEDURE — 74177 CT ABD & PELVIS W/CONTRAST: CPT | Mod: MG

## 2022-10-03 PROCEDURE — 99285 EMERGENCY DEPT VISIT HI MDM: CPT | Mod: 25

## 2022-10-03 PROCEDURE — 72125 CT NECK SPINE W/O DYE: CPT | Mod: 26,MG

## 2022-10-03 PROCEDURE — 85730 THROMBOPLASTIN TIME PARTIAL: CPT

## 2022-10-03 PROCEDURE — 84484 ASSAY OF TROPONIN QUANT: CPT

## 2022-10-03 PROCEDURE — 72125 CT NECK SPINE W/O DYE: CPT | Mod: MG

## 2022-10-03 PROCEDURE — 71260 CT THORAX DX C+: CPT | Mod: 26,MG

## 2022-10-03 PROCEDURE — 73521 X-RAY EXAM HIPS BI 2 VIEWS: CPT

## 2022-10-03 PROCEDURE — 80053 COMPREHEN METABOLIC PANEL: CPT

## 2022-10-03 PROCEDURE — 82962 GLUCOSE BLOOD TEST: CPT

## 2022-10-03 PROCEDURE — 93005 ELECTROCARDIOGRAM TRACING: CPT

## 2022-10-03 PROCEDURE — 73562 X-RAY EXAM OF KNEE 3: CPT | Mod: 26,RT

## 2022-10-03 PROCEDURE — 73552 X-RAY EXAM OF FEMUR 2/>: CPT | Mod: 26,RT

## 2022-10-03 PROCEDURE — 85025 COMPLETE CBC W/AUTO DIFF WBC: CPT

## 2022-10-03 RX ORDER — SODIUM CHLORIDE 9 MG/ML
1000 INJECTION INTRAMUSCULAR; INTRAVENOUS; SUBCUTANEOUS ONCE
Refills: 0 | Status: COMPLETED | OUTPATIENT
Start: 2022-10-03 | End: 2022-10-03

## 2022-10-03 RX ORDER — POTASSIUM CHLORIDE 20 MEQ
40 PACKET (EA) ORAL ONCE
Refills: 0 | Status: COMPLETED | OUTPATIENT
Start: 2022-10-03 | End: 2022-10-03

## 2022-10-03 RX ORDER — PHYTONADIONE (VIT K1) 5 MG
10 TABLET ORAL ONCE
Refills: 0 | Status: COMPLETED | OUTPATIENT
Start: 2022-10-03 | End: 2022-10-03

## 2022-10-03 RX ADMIN — SODIUM CHLORIDE 1000 MILLILITER(S): 9 INJECTION INTRAMUSCULAR; INTRAVENOUS; SUBCUTANEOUS at 14:27

## 2022-10-03 RX ADMIN — Medication 40 MILLIEQUIVALENT(S): at 16:16

## 2022-10-03 RX ADMIN — Medication 10 MILLIGRAM(S): at 17:19

## 2022-10-03 NOTE — ED PROVIDER NOTE - CLINICAL SUMMARY MEDICAL DECISION MAKING FREE TEXT BOX
Gerald, PGY2 - see MDM Patient with near-syncopal episode with spontaneous resolution. No fall no trauma. Will evaluate labs, ekg, and reassess. Patient stable in the ED. Will also hydrate. Patient likely able to be discharged home if all studies are wnl. Patient signed-out to Dr. Pulido at 15:30. Patient stable in the ED.

## 2022-10-03 NOTE — ED ADULT NURSE REASSESSMENT NOTE - NS ED NURSE REASSESS COMMENT FT1
Patient received into bed 8 in [facility] area. patient is alert and oriented x3 and states she felt dizzy while at work. denies any LOC or fall. Fingerstick 81mg/dl. Patient states she took her waterpill this morning but didn't eat anything. Her BP on arrival was in the 90s systolic. On assessment in gold her BP cfn180/79. Patient denies being dizzy at present time and has no weakness or headache. EKG and chest xray performed and patient placed on cardiac monitor in afib, hear rate 91. Labs sent and IV placed. Pending results.

## 2022-10-03 NOTE — ED PROVIDER NOTE - OBJECTIVE STATEMENT
63yo woman with PMH afib on warfarin, HTN, asthma, presenting with lightheadedness since noon. 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 On lasix. Took all meds today. Denies LOC, head trauma. Denies fevers, chills, N/V, SOB, chest pain. 63yo woman with PMH afib on warfarin, HTN, asthma, presenting with lightheadedness since noon. 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 On lasix since June. Took all meds today. Denies LOC, head trauma. Denies fevers, chills, N/V, SOB, chest pain.

## 2022-10-03 NOTE — ED PROVIDER NOTE - PHYSICAL EXAMINATION
Gen: NAD, AOx3, able to make needs known, non-toxic  Head: NCAT  HEENT: EOMI, normal conjunctiva  Lung: CTAB, no respiratory distress, no wheezes/rhonchi/rales B/L, speaking in full sentences  CV: tachycardic, regular rhythm, no M/R/G, pulses bilaterally   Abd: soft, NTND, no guarding, no CVA tenderness  MSK: no visible bony deformities  Neuro: No focal sensory or motor deficits  Skin: Warm, well perfused, no rash  Psych: normal affect

## 2022-10-03 NOTE — ED PROVIDER NOTE - PROGRESS NOTE DETAILS
Gerald, PGY2 - INR >14. Went to reexamine patient and perform full neuro exam, patient unable to lift right leg off bed. Now saying that she does not remember falling, unclear how she landed. Patient undressed, bruises along right knee and thigh. In the context of increased INR, syncope, and patient living by herself, will roberts scan patient and perform xrays of the RLE. Gerald, PGY2 - Spoke with daughter Lupe, spoke over the phone with patient permission. Family member was updated on patient status, workup, and disposition. Waiting for CTs Catie Rasmussen PGY-3 patient signed out to me, states that she is not in any pain. no chest/abd pain, ecchymosis, bruising or any other sxs.CT showing possible hematoma, CBC downtrending s/p 1L ns  will repeat, if continue to downtrend will cx surg patient well appearing, stable for discharge, vitals reviewed, given strict return precautions for any new concerning sxs to come back to er. Plan to follow up with PMD, plan to hold coumadin until follow up  repeat cbc stable, downtrend likely due to hemodilution

## 2022-10-03 NOTE — ED PROVIDER NOTE - ATTENDING CONTRIBUTION TO CARE
I, Shivam Reyna, performed a history and physical exam of the patient and discussed their management with the resident and /or advanced care provider. I reviewed the resident and /or ACP's note and agree with the documented findings and plan of care. I was present and available for all procedures.  Patient with near-syncopal episode with spontaneous resolution. No fall no trauma. Will evaluate labs, ekg, and reassess. Patient stable in the ED. Will also hydrate.

## 2022-10-03 NOTE — ED PROVIDER NOTE - PATIENT PORTAL LINK FT
You can access the FollowMyHealth Patient Portal offered by [facility] by registering at the following website: [email or web address]. By joining [facility]’s FollowMyHealth portal, you will also be able to view your health information using other applications (apps) compatible with our system.

## 2022-10-03 NOTE — ED ADULT NURSE NOTE - OBJECTIVE STATEMENT
Patient is a 63 y/o female with PMH of afib on warfarin, HTN, asthma presenting to the ED via EMS with c/o hypotension, near syncope, dizziness/lightheadedness today. Pt was at work, pt works in a school, suddenly felt dizzy and lightheaded, pt sat self down on ground, denies LOC/head trauma, school nurse took pt's BP which was hypotensive (80s/60s), upon arrival to ED pt's BP improving to higher 90s/60s. Pt alert and oriented x4, speaking coherently, neuro intact. Pt breathing unlabored, denies SOB, denies CP, peripheral pulses strong, skin normal color/warm, denies n/v/d, denies urinary symptoms, denies fever, cough, chills, or recent illness.

## 2022-10-03 NOTE — ED ADULT NURSE NOTE - NSIMPLEMENTINTERV_GEN_ALL_ED
Implemented All Fall with Harm Risk Interventions:  [place] to call system. Call bell, personal items and telephone within reach. Instruct patient to call for assistance. Room bathroom lighting operational. Non-slip footwear when patient is off stretcher. Physically safe environment: no spills, clutter or unnecessary equipment. Stretcher in lowest position, wheels locked, appropriate side rails in place. Provide visual cue, wrist band, yellow gown, etc. Monitor gait and stability. Monitor for mental status changes and reorient to person, place, and time. Review medications for side effects contributing to fall risk. Reinforce activity limits and safety measures with patient and family. Provide visual clues: red socks.

## 2022-10-04 VITALS
TEMPERATURE: 98 F | OXYGEN SATURATION: 99 % | RESPIRATION RATE: 15 BRPM | DIASTOLIC BLOOD PRESSURE: 71 MMHG | SYSTOLIC BLOOD PRESSURE: 107 MMHG | HEART RATE: 85 BPM

## 2022-10-04 LAB — GLUCOSE BLDC GLUCOMTR-MCNC: 81 MG/DL — SIGNIFICANT CHANGE UP (ref 70–99)

## 2022-10-06 ENCOUNTER — NON-APPOINTMENT (OUTPATIENT)
Age: 62
End: 2022-10-06

## 2022-10-07 ENCOUNTER — NON-APPOINTMENT (OUTPATIENT)
Age: 62
End: 2022-10-07

## 2022-10-07 ENCOUNTER — APPOINTMENT (OUTPATIENT)
Dept: INTERNAL MEDICINE | Facility: CLINIC | Age: 62
End: 2022-10-07
Payer: COMMERCIAL

## 2022-10-07 VITALS
HEIGHT: 63 IN | OXYGEN SATURATION: 96 % | SYSTOLIC BLOOD PRESSURE: 122 MMHG | DIASTOLIC BLOOD PRESSURE: 73 MMHG | TEMPERATURE: 98.3 F | BODY MASS INDEX: 26.93 KG/M2 | HEART RATE: 102 BPM | WEIGHT: 152 LBS

## 2022-10-07 VITALS — DIASTOLIC BLOOD PRESSURE: 62 MMHG | SYSTOLIC BLOOD PRESSURE: 110 MMHG

## 2022-10-07 DIAGNOSIS — R07.89 OTHER CHEST PAIN: ICD-10-CM

## 2022-10-07 DIAGNOSIS — R60.0 LOCALIZED EDEMA: ICD-10-CM

## 2022-10-07 LAB
INR PPP: 1.5 RATIO
POCT-PROTHROMBIN TIME: 17.8 SECS
QUALITY CONTROL: YES

## 2022-10-07 PROCEDURE — 99215 OFFICE O/P EST HI 40 MIN: CPT | Mod: 25

## 2022-10-07 PROCEDURE — G0008: CPT

## 2022-10-07 PROCEDURE — 90686 IIV4 VACC NO PRSV 0.5 ML IM: CPT

## 2022-10-07 PROCEDURE — 85610 PROTHROMBIN TIME: CPT | Mod: QW

## 2022-10-07 PROCEDURE — 36416 COLLJ CAPILLARY BLOOD SPEC: CPT

## 2022-10-07 RX ORDER — IBUPROFEN 400 MG/1
400 TABLET, FILM COATED ORAL
Qty: 20 | Refills: 0 | Status: COMPLETED | COMMUNITY
Start: 2022-06-21 | End: 2022-08-08

## 2022-10-07 RX ORDER — ALBUTEROL SULFATE 90 UG/1
108 (90 BASE) INHALANT RESPIRATORY (INHALATION)
Qty: 1 | Refills: 5 | Status: COMPLETED | COMMUNITY
Start: 2022-04-13 | End: 2022-06-01

## 2022-10-07 RX ORDER — LOSARTAN POTASSIUM AND HYDROCHLOROTHIAZIDE 12.5; 5 MG/1; MG/1
50-12.5 TABLET ORAL DAILY
Qty: 1 | Refills: 3 | Status: DISCONTINUED | COMMUNITY
Start: 2022-05-06 | End: 2022-10-07

## 2022-10-07 NOTE — ASSESSMENT
[FreeTextEntry1] : Assessment as indicated above in impressions with plans through orders below. Patient recently had annual exam with previous PMD, today's visit focused on Atrial fibrillation and recent ER visit.  Time spent educating patient on taking medications appropriately, explaining reason for thinning medication due to her irregular heart rate. \par \par Reviewed ER visit notes on the HIE, labs found on the HIE and imaging done as well, along with chart notes from other providers.  \par \par Medication list was printed out and given to patient, with further instructions to discontinue the warfarin and losartan-hctz.

## 2022-10-07 NOTE — HISTORY OF PRESENT ILLNESS
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  When she got up with a friend helping her, she fell over, fainting and landed on her but.  Did not hit her head.  Went to ER by ambulance. \par \par Came in to work without breakfast: taking the following medications: warfarin 7 mg and losartan-hctz 50-12.5mg tablets.  Told by the ER to stop all medications and she did, hasn't been taking it since Monday.  She has with her chart notes, all of her medications for this provider to review, stating that her daugher was the one who printed it all out and told her to bring her medications.\par \par Her medication bag are split into two, one for morning and one for evening.  Morning she is taking her blood pressure medication, metoprolol and warfarin daily.  The evening she has another bottle of warfarin for 6 mg that she takes.  SHe clarifies to this provider that she is on warfarin twice daily.  \par \par Review of her ER visit note from the [facility], reveals that on admission there, her INR was found to be super-therapeutic at around 16 then repeat at 14. Patient reports that she has been doing well since her ER visit without any problems, denying any chest pains, palpitations, shortness of breath or any other symptoms.\par \par She hadn't checked her blood pressure yet this week but she is off all medications during this office visit. \par \par Has not seen a Cardiologist for a long time, but sees  Pulmonary and\par \par \par Been out of work since Monday ER visit, returning next Tuesday\par \par Patient stopped taking her medication

## 2022-10-07 NOTE — CURRENT MEDS
[Takes medication as prescribed] : does not take [Cost] : cost [Lack of understanding] : lack of understanding

## 2022-10-07 NOTE — HEALTH RISK ASSESSMENT
[Intercurrent ED visits] : went to ED [One fall no injury in past year] : Patient reported one fall in the past year without injury [PHQ-2 Negative - No further assessment needed] : PHQ-2 Negative - No further assessment needed [Transportation] : transportation [Financial] : financial [Alone] : lives alone [Employed] : employed [Single] : single [# Of Children ___] : has [unfilled] children [Smoke Detector] : smoke detector [Carbon Monoxide Detector] : carbon monoxide detector [Seat Belt] :  uses seat belt [Sexually Active] : not sexually active [Reports changes in hearing] : Reports no changes in hearing [Reports changes in vision] : Reports no changes in vision [Reports changes in dental health] : Reports no changes in dental health

## 2022-10-07 NOTE — PHYSICAL EXAM
[Normal TMs] : both tympanic membranes were normal [No Lymphadenopathy] : no lymphadenopathy [Normal] : no respiratory distress, lungs were clear to auscultation bilaterally and no accessory muscle use [Normal S1, S2] : normal S1 and S2 [No Murmur] : no murmur heard [No Edema] : there was no peripheral edema [Normal Anterior Cervical Nodes] : no anterior cervical lymphadenopathy [Speech Grossly Normal] : speech grossly normal [Normal Mood] : the mood was normal [de-identified] : irregular rate

## 2022-10-10 ENCOUNTER — RX RENEWAL (OUTPATIENT)
Age: 62
End: 2022-10-10

## 2022-10-14 ENCOUNTER — RX RENEWAL (OUTPATIENT)
Age: 62
End: 2022-10-14

## 2022-10-21 ENCOUNTER — APPOINTMENT (OUTPATIENT)
Dept: INTERNAL MEDICINE | Facility: CLINIC | Age: 62
End: 2022-10-21
Payer: COMMERCIAL

## 2022-10-21 VITALS
HEART RATE: 85 BPM | SYSTOLIC BLOOD PRESSURE: 167 MMHG | BODY MASS INDEX: 27.82 KG/M2 | DIASTOLIC BLOOD PRESSURE: 99 MMHG | TEMPERATURE: 98.1 F | HEIGHT: 63 IN | OXYGEN SATURATION: 97 % | WEIGHT: 157 LBS

## 2022-10-21 VITALS — SYSTOLIC BLOOD PRESSURE: 142 MMHG | DIASTOLIC BLOOD PRESSURE: 82 MMHG

## 2022-10-21 PROCEDURE — 99213 OFFICE O/P EST LOW 20 MIN: CPT | Mod: 25

## 2022-10-21 PROCEDURE — G0009: CPT

## 2022-10-21 PROCEDURE — 90677 PCV20 VACCINE IM: CPT

## 2022-10-21 RX ORDER — WARFARIN 6 MG/1
6 TABLET ORAL DAILY
Qty: 30 | Refills: 0 | Status: DISCONTINUED | COMMUNITY
Start: 2022-08-05 | End: 2022-10-21

## 2022-10-25 ENCOUNTER — RX RENEWAL (OUTPATIENT)
Age: 62
End: 2022-10-25

## 2022-10-25 ENCOUNTER — APPOINTMENT (OUTPATIENT)
Dept: INTERNAL MEDICINE | Facility: CLINIC | Age: 62
End: 2022-10-25
Payer: COMMERCIAL

## 2022-10-25 VITALS — DIASTOLIC BLOOD PRESSURE: 68 MMHG | SYSTOLIC BLOOD PRESSURE: 108 MMHG

## 2022-10-25 VITALS
BODY MASS INDEX: 26.58 KG/M2 | DIASTOLIC BLOOD PRESSURE: 83 MMHG | OXYGEN SATURATION: 96 % | HEART RATE: 95 BPM | WEIGHT: 150 LBS | TEMPERATURE: 98.1 F | SYSTOLIC BLOOD PRESSURE: 122 MMHG | HEIGHT: 63 IN

## 2022-10-25 DIAGNOSIS — R06.02 SHORTNESS OF BREATH: ICD-10-CM

## 2022-10-25 PROCEDURE — 99213 OFFICE O/P EST LOW 20 MIN: CPT

## 2022-10-25 NOTE — HISTORY OF PRESENT ILLNESS
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 \par \par No dizziness, no syncope, no chest pains, no shortness of breaths.  \par \par Lives at her daughter's ex-boyfriend's grandmother.   Daughter had moved to [place] to help her sister out.  She does not have any help around, friends or family to rely on for assistance with her medical issues. \par \par Patient still not understanding which medication she was told to discontinue at last visit. She had some difficulties in picking up the new medication for anticoagulation.\par \par Originally from [place], high school graduate.  \par \par Patient gives permission to discuss care with her Daughter Lupe Mcginnis: [phone]

## 2022-10-26 ENCOUNTER — APPOINTMENT (OUTPATIENT)
Dept: CARDIOLOGY | Facility: CLINIC | Age: 62
End: 2022-10-26
Payer: COMMERCIAL

## 2022-10-26 ENCOUNTER — NON-APPOINTMENT (OUTPATIENT)
Age: 62
End: 2022-10-26

## 2022-10-26 VITALS
DIASTOLIC BLOOD PRESSURE: 82 MMHG | WEIGHT: 148 LBS | SYSTOLIC BLOOD PRESSURE: 118 MMHG | BODY MASS INDEX: 26.22 KG/M2 | OXYGEN SATURATION: 98 % | HEART RATE: 78 BPM

## 2022-10-26 PROCEDURE — 99214 OFFICE O/P EST MOD 30 MIN: CPT | Mod: 25

## 2022-10-26 PROCEDURE — 93000 ELECTROCARDIOGRAM COMPLETE: CPT

## 2022-10-26 RX ORDER — RIVAROXABAN 20 MG/1
20 TABLET, FILM COATED ORAL
Qty: 30 | Refills: 11 | Status: DISCONTINUED | COMMUNITY
Start: 2022-10-07 | End: 2022-10-26

## 2022-10-26 RX ORDER — HYDROCHLOROTHIAZIDE 12.5 MG/1
12.5 TABLET ORAL
Qty: 30 | Refills: 0 | Status: DISCONTINUED | COMMUNITY
Start: 2022-04-13 | End: 2022-10-26

## 2022-10-26 NOTE — DISCUSSION/SUMMARY
[FreeTextEntry1] : Mrs. Salvador is feeling generally well at present and is not short of breath.  Her exam shows an irregular rhythm, rate of about 70, normal blood pressure, clear lungs, and a normal cardiac exam.  There is no peripheral edema.  An EKG shows atrial fibrillation and is otherwise unremarkable.\par \par Her echo indicates significant heart disease, but it was done at the time she was in rapid atrial fibrillation during her hospitalization..  It needs to be repeated to see if the abnormalities are still present.\par \par I also think she might be a candidate for atrial fibrillation and will discuss this with her after the echo is completed. [EKG obtained to assist in diagnosis and management of assessed problem(s)] : EKG obtained to assist in diagnosis and management of assessed problem(s)

## 2022-10-26 NOTE — HISTORY OF PRESENT ILLNESS
[FreeTextEntry1] : 62-year-old female with a history of chronic atrial fibrillation, mitral regurgitation, pulmonary hypertension, congestive heart failure with an ejection fraction of about 45%, COPD.  She was last seen in May after admitted to the hospital with rapid atrial fibrillation and shortness of breath.  She is feeling generally well at present and reports she is able to do her job as a  without any difficulty.  She has no palpitations and no shortness of breath.\par \par She was switched from Xarelto to Coumadin because of cost.

## 2022-10-31 PROBLEM — R06.02 SOB (SHORTNESS OF BREATH): Noted: 2022-09-19

## 2022-10-31 NOTE — REVIEW OF SYSTEMS
[Palpitations] : palpitations [Lower Ext Edema] : lower extremity edema [Shortness Of Breath] : shortness of breath [Cough] : cough [Chest Pain] : no chest pain [FreeTextEntry2] : see also HPI

## 2022-10-31 NOTE — HISTORY OF PRESENT ILLNESS
[FreeTextEntry1] : Follow up atrial fibrillation management [de-identified] : Patient presents for follow up of her atrial fibrillation management.  Patient had some issues with getting the Xarelto filled a week ago. She reports that she is on her own in managing her health care, that both her daughters live in [place].  The one daughter who is always of assistance with whom she gives permission to this provider to discuss her case does not come home from work until off until around 6.\par \par She states she sometimes finds herself coughing occasionally, denies any more fainting spells, chest pains or palpitations.  She is not sure about which medication she was supposed to take and stop, she has brought in all her medications for review, but forgot the Trilegy at home.

## 2022-10-31 NOTE — PHYSICAL EXAM
[Normal] : no acute distress, well nourished, well developed and well-appearing [No Respiratory Distress] : no respiratory distress  [Clear to Auscultation] : lungs were clear to auscultation bilaterally [Normal S1, S2] : normal S1 and S2 [de-identified] : irregular rate [de-identified] : trace edema bilaterally

## 2022-11-01 ENCOUNTER — NON-APPOINTMENT (OUTPATIENT)
Age: 62
End: 2022-11-01

## 2022-11-01 ENCOUNTER — APPOINTMENT (OUTPATIENT)
Dept: INTERNAL MEDICINE | Facility: CLINIC | Age: 62
End: 2022-11-01

## 2022-11-01 VITALS
WEIGHT: 152 LBS | HEART RATE: 78 BPM | HEIGHT: 63 IN | SYSTOLIC BLOOD PRESSURE: 168 MMHG | BODY MASS INDEX: 26.93 KG/M2 | TEMPERATURE: 98.9 F | DIASTOLIC BLOOD PRESSURE: 94 MMHG

## 2022-11-01 LAB
INR PPP: 1.5 RATIO
POCT-PROTHROMBIN TIME: 18.1 SECS
QUALITY CONTROL: YES

## 2022-11-01 PROCEDURE — 85610 PROTHROMBIN TIME: CPT | Mod: QW

## 2022-11-18 ENCOUNTER — APPOINTMENT (OUTPATIENT)
Dept: INTERNAL MEDICINE | Facility: CLINIC | Age: 62
End: 2022-11-18

## 2022-11-18 ENCOUNTER — APPOINTMENT (OUTPATIENT)
Dept: PULMONOLOGY | Facility: CLINIC | Age: 62
End: 2022-11-18
Payer: COMMERCIAL

## 2022-11-18 ENCOUNTER — NON-APPOINTMENT (OUTPATIENT)
Age: 62
End: 2022-11-18

## 2022-11-18 VITALS
DIASTOLIC BLOOD PRESSURE: 74 MMHG | HEIGHT: 67 IN | WEIGHT: 146 LBS | RESPIRATION RATE: 16 BRPM | HEART RATE: 69 BPM | TEMPERATURE: 97.4 F | SYSTOLIC BLOOD PRESSURE: 112 MMHG | OXYGEN SATURATION: 98 % | BODY MASS INDEX: 22.91 KG/M2

## 2022-11-18 LAB
INR PPP: 1.5 RATIO
POCT-PROTHROMBIN TIME: 17.6 SECS
QUALITY CONTROL: YES

## 2022-11-18 PROCEDURE — 95012 NITRIC OXIDE EXP GAS DETER: CPT

## 2022-11-18 PROCEDURE — 94010 BREATHING CAPACITY TEST: CPT

## 2022-11-18 PROCEDURE — 99214 OFFICE O/P EST MOD 30 MIN: CPT | Mod: 25

## 2022-11-18 NOTE — ADDENDUM
[FreeTextEntry1] : Documented by Sergio Caldwell acting as a scribe for Dr. Seth Curtis on (11/18/2022).\par \par All medical record entries made by the Scribe were at my, Dr. Seth Curtis's, direction and personally dictated by me on (11/18/2022). I have reviewed the chart and agree that the record accurately reflects my personal performance of the history, physical exam, assessment and plan. I have personally directed, reviewed, and agree with the discharge instructions.

## 2022-11-18 NOTE — PHYSICAL EXAM
[No Acute Distress] : no acute distress [Normal Oropharynx] : normal oropharynx [III] : Mallampati Class: III [Normal Appearance] : normal appearance [No Neck Mass] : no neck mass [Normal Rate/Rhythm] : normal rate/rhythm [Normal S1, S2] : normal s1, s2 [No Murmurs] : no murmurs [No Resp Distress] : no resp distress [Clear to Auscultation Bilaterally] : clear to auscultation bilaterally [No Abnormalities] : no abnormalities [Benign] : benign [Normal Gait] : normal gait [No Clubbing] : no clubbing [No Cyanosis] : no cyanosis [No Edema] : no edema [FROM] : FROM [Normal Color/ Pigmentation] : normal color/ pigmentation [No Focal Deficits] : no focal deficits [Oriented x3] : oriented x3 [Normal Affect] : normal affect [TextBox_2] : ow  [TextBox_68] : I:E ratio 1:3; clear  [TextBox_89] : mass like density posterior part of right leg, measuring abut 2 cm, not compressible

## 2022-11-18 NOTE — ASSESSMENT
[FreeTextEntry1] : Ms. CHAMPION is a 62 year old female with a history of a fib, grade 3 distolic dysfunction, positive messi, asthma presenting to the office today for follow up pulmonary evaluation. Her chief complaint is sob, mild persistent asthma, ?allergies, ?JULIANNA- ?PAH\par \par Her shortness of breath is multifactorial due to:\par -poor mechanics of breathing \par -out of shape / overweight\par -pulmonary disease\par    -Mild restrictive dysfunction- WNL Diffusion \par -cardiac disease \par \par Problem 1: Mild persistent Asthma\par - add Albuterol 0.83% via nebulizer, Q6H \par -add Trelegy 100 1 puff QD \par Asthma is  believed to be caused by inherited (genetic) and environmental factor, but its exact cause is unknown. Asthma may be triggered by allergens, lung infections, or irritants in the air. Asthma triggers are different for each person \par Inhaler technique reviewed as well as oral hygiene techniques reviewed with patient. Avoidance of cold air, extremes of temperature, rescue inhaler should be used before exercise. Order of medication reviewed with patient. Recommended use of a cool mist humidifier in the bedroom. \par \par Problem 1A: Eosinophilic Asthma \par - The safety and efficacy of Nucala was established in three double-blind, randomized, placebo-controlled trials in patients with severe asthma. Compared to a placebo, patients with severe asthma receiving Nucala had fewer exacerbation requiring hospitalization and/or emergency department visits, and a longer time to first exacerbation. In addition, patients with severe asthma receiving Nucala or Fasenra experienced greater reductions in their daily maintenance oral corticosteroid dose, while maintaining asthma control compared with patients receiving placebo. Treatment with Nucala did not result in a significant improvement in lung function, as measured by the volume of air exhaled by patients in one second. The most common side effects include: headache, injection site reactions, back pain, weakness, and fatigue; hypersensitivity reactions can occur within hours or days including swelling of the face, mouth, and tongue, fainting, dizziness, hives, breathing problems, and rash; herpes zoster infections have occurred. The drug is a monoclonal antibody that inhibits interleukin-5 which helps regular eosinophils, a type of white blood cell that contributes to asthma. The over-production of eosinophils can cause inflammation in the lungs, increasing the frequency of asthma attacks. Patients must also take other medications, including high dose inhaled corticosteroids and at least one additional asthma drug. \par \par Prble 1B: Elevated IgE 174\par - Xolair candidate \par -Xolair is a recombinant DNA- derived humanized IgG1K monoclonal antibody that selectively binds ot human immunoglobulin E (IgE). Xolair is produced by a Chinese hamster ovary cell suspension culture in nutrient medium containing the antibiotic gentamicin. Gentamicin is not detectable in the final product. Xolair is a sterile, white, preservative free, lyophilized powder contained in a single use vial that is reconstituted with sterile water for suspension. Side effects include: wheezing, tightness of the chest, trouble breathing, hives, skin rash, feeling anxious or light-headed, fainting, warmth or tingling under skin, or swelling of face, lips, or tongue \par \par Problem 2: ?Allergies \par - a/p given for blood work: + asthma profile, + food IgE panel, eosinophil level, IgE level 174, Vitamin D level Low\par Environmental measures for allergies were encouraged including mattress and pillow covers, air purifier, and environmental controls. \par \par Problem 2A: Low Vitamin D\par - Suplement \par - Has been associated with asthma exacerbations and increased allergic symptoms. The goal based on recent information is maintaining levels between 50-70 and low normal is 30. Recommended 50,000 units every two weeks to once a month depending on the level.\par \par Problem 3: ?JULIANNA (mallampati class, A fib,)\par -Complete home sleep study \par Sleep apnea is associated with adverse clinical consequences which can affect most organ systems.  Cardiovascular disease risk includes arrhythmias, atrial fibrillation, hypertension, coronary artery disease, and stroke. Metabolic disorders include diabetes type 2, non-alcoholic fatty liver disease. Mood disorder especially depression; and cognitive decline especially in the elderly. Associations with  chronic reflux/Wilson’s esophagus some but not all inclusive. \par -Reasons  include arousal consistent with hypopnea; respiratory events most prominent in REM sleep or supine position; therefore sleep staging and body position are important for accurate diagnosis and estimation of AHI. \par \par problem 4: cardiac disease\par -recommended to continue to follow up with Cardiologist (Dr. Mejia)- ECHO \par \par problem 4A: ?PAH\par - ECHO \par - RHC after if needed Vq scan \par \par problem 5: poor breathing mechanics\par -Proper breathing techniques were reviewed with an emphasis of exhalation. Patient instructed to breath in for 1 second and out for four seconds. Patient was encouraged to not talk while walking. \par \par problem 6: Rule out vascular abnormality \par - Get Doppler \par \par problem 7: out of shape / overweight\par -Weight loss, exercise, and diet control were discussed and are highly encouraged. Treatment options were given such as, aqua therapy, and contacting a nutritionist. Recommended to use the elliptical, stationary bike, less use of treadmill. Mindful eating was explained to the patient Obesity is associated with worsening asthma, shortness of breath, and potential for cardiac disease, diabetes, and other underlying medical conditions\par \par problem 8: health maintenance \par -recommended yearly flu shot after October 15 2022 \par -recommended strep pneumonia vaccines: Prevnar-13 vaccine, followed by Pneumo vaccine 23 one year following after 65\par -recommended early intervention for Upper Respiratory Infections (URIs)\par -recommended regular osteoporosis evaluations\par -recommended early dermatological evaluations\par -recommended after the age of 50 to the age of 70, colonoscopy every 5 years\par \par F/U in 6-8 weeks.\par She is encouraged to call with any changes, concerns, or questions

## 2022-11-18 NOTE — HISTORY OF PRESENT ILLNESS
[TextBox_4] : Ms. CHAMPION is a 62 year old female with a history of a fib, grade 3 distolic dysfunction, positive messi, asthma presenting to the office today for follow up pulmonary evaluation. 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 \par - she notes not knowing for how long the mass like density on right leg is there\par - she notes it might be since September \par - she notes walking\par - she notes weight is stable \par - she notes sleeping well \par - she notes seeing cardiologist \par - she notes not getting ECHO done \par \par - She denies any headaches, nausea, vomiting, fever, chills, sweats, chest pain, chest pressure, palpitations, SOB, coughing, wheezing, fatigue, diarrhea, constipation, dysphagia, myalgias, dizziness, leg swelling, leg pain, itchy eyes, itchy ears, heartburn, reflux, or sour taste in the mouth

## 2022-11-18 NOTE — PROCEDURE
[FreeTextEntry1] : PFT reveals mild restriction, with an FEV1 of  1.64L, which is 71% of predicted, with a normal flow volume loop \par \par FENO was 35; a normal value being less than 25\par Fractional exhaled nitric oxide (FENO) is regarded as a simple, noninvasive method for assessing eosinophilic airway inflammation. Produced by a variety of cells within the lung, nitric oxide (NO) concentrations are generally low in healthy individuals. However, high concentrations of NO appear to be involved in nonspecific host defense mechanisms and chronic inflammatory diseases such as asthma. The American Thoracic Society (ATS) therefore has recommended using FENO to aid in the diagnosis and monitoring of eosinophilic airway inflammation and asthma, and for identifying steroid responsive individuals whose chronic respiratory symptoms may be caused by airway inflammation.

## 2022-11-22 ENCOUNTER — APPOINTMENT (OUTPATIENT)
Dept: INTERNAL MEDICINE | Facility: CLINIC | Age: 62
End: 2022-11-22
Payer: COMMERCIAL

## 2022-11-22 VITALS
SYSTOLIC BLOOD PRESSURE: 134 MMHG | TEMPERATURE: 97.6 F | WEIGHT: 146 LBS | DIASTOLIC BLOOD PRESSURE: 83 MMHG | BODY MASS INDEX: 22.91 KG/M2 | OXYGEN SATURATION: 96 % | HEIGHT: 67 IN | HEART RATE: 58 BPM

## 2022-11-22 DIAGNOSIS — R22.41 LOCALIZED SWELLING, MASS AND LUMP, RIGHT LOWER LIMB: ICD-10-CM

## 2022-11-22 PROCEDURE — 85610 PROTHROMBIN TIME: CPT | Mod: QW

## 2022-11-22 PROCEDURE — 99214 OFFICE O/P EST MOD 30 MIN: CPT | Mod: 25

## 2022-11-23 ENCOUNTER — APPOINTMENT (OUTPATIENT)
Dept: CARDIOLOGY | Facility: CLINIC | Age: 62
End: 2022-11-23
Payer: COMMERCIAL

## 2022-11-23 VITALS
DIASTOLIC BLOOD PRESSURE: 80 MMHG | OXYGEN SATURATION: 99 % | BODY MASS INDEX: 23.18 KG/M2 | SYSTOLIC BLOOD PRESSURE: 140 MMHG | HEART RATE: 79 BPM | WEIGHT: 148 LBS

## 2022-11-23 PROCEDURE — 93306 TTE W/DOPPLER COMPLETE: CPT

## 2022-11-23 PROCEDURE — 99214 OFFICE O/P EST MOD 30 MIN: CPT | Mod: 25

## 2022-11-23 PROCEDURE — 99214 OFFICE O/P EST MOD 30 MIN: CPT

## 2022-11-23 NOTE — HISTORY OF PRESENT ILLNESS
[FreeTextEntry1] : 62-year-old female with a history of chronic atrial fibrillation associated with mild global left ventricular dysfunction and mild pulmonary hypertension returns for follow-up and echocardiography.  She remains feeling generally well.  She has no palpitations and is able to function and do her job without any difficulty.

## 2022-11-23 NOTE — DISCUSSION/SUMMARY
[FreeTextEntry1] : Ms Salvador continues to function normally without any palpitations.  Her exam shows regular rhythm at a rate of about 80, normal blood pressure, clear lungs, and a normal cardiac exam.\par \par An echo was performed just prior to the visit.  Her systolic function is improved with an ejection fraction now of about 55%.  Her pulmonary artery pressures also dropped a little and is now measured at 38.\par \par Although she is not symptomatic, I am a little concerned about the effect of atrial fibrillation on her left ventricle chronically and discussed ablation with her.  She is interested in proceeding and electrophysiology was contacted.

## 2022-11-23 NOTE — REVIEW OF SYSTEMS
[Feeling Fatigued] : not feeling fatigued [Dyspnea on exertion] : dyspnea during exertion [Negative] : Heme/Lymph

## 2022-11-28 ENCOUNTER — NON-APPOINTMENT (OUTPATIENT)
Age: 62
End: 2022-11-28

## 2022-11-28 PROBLEM — R22.41 LUMP OF SKIN OF RIGHT LOWER EXTREMITY: Status: ACTIVE | Noted: 2022-11-18

## 2022-11-28 NOTE — HISTORY OF PRESENT ILLNESS
[FreeTextEntry1] : follow up atrial fibrillation [de-identified] : Patient presents for follow up of her Afib, she was seen by the Pharmacist and noted to still have a low INR level. Warfarin taking without increase of her greens. Dr. Red mentions that she is not sure if patient is taking furosemide, patient does have increase swelling of her legs.  Patient is concerned about a patchy spot she has noticed on the back of her right leg.\par \par Patient without any other symptoms.\par \par Patient unable to discuss what was discussed at her specialist's visits.

## 2022-11-28 NOTE — PHYSICAL EXAM
[Normal] : no acute distress, well nourished, well developed and well-appearing [No Respiratory Distress] : no respiratory distress  [No Edema] : there was no peripheral edema [No Joint Swelling] : no joint swelling [de-identified] : posterior right leg noted to have firm fullness palpated  [de-identified] : p

## 2022-12-02 ENCOUNTER — APPOINTMENT (OUTPATIENT)
Dept: INTERNAL MEDICINE | Facility: CLINIC | Age: 62
End: 2022-12-02

## 2022-12-02 PROCEDURE — 85610 PROTHROMBIN TIME: CPT | Mod: QW

## 2022-12-08 ENCOUNTER — NON-APPOINTMENT (OUTPATIENT)
Age: 62
End: 2022-12-08

## 2022-12-16 ENCOUNTER — APPOINTMENT (OUTPATIENT)
Dept: INTERNAL MEDICINE | Facility: CLINIC | Age: 62
End: 2022-12-16

## 2022-12-19 ENCOUNTER — APPOINTMENT (OUTPATIENT)
Dept: SLEEP CENTER | Facility: CLINIC | Age: 62
End: 2022-12-19

## 2022-12-21 ENCOUNTER — NON-APPOINTMENT (OUTPATIENT)
Age: 62
End: 2022-12-21

## 2022-12-21 ENCOUNTER — APPOINTMENT (OUTPATIENT)
Dept: INTERNAL MEDICINE | Facility: CLINIC | Age: 62
End: 2022-12-21

## 2022-12-21 VITALS — DIASTOLIC BLOOD PRESSURE: 90 MMHG | SYSTOLIC BLOOD PRESSURE: 148 MMHG

## 2022-12-21 PROCEDURE — 85610 PROTHROMBIN TIME: CPT | Mod: QW

## 2022-12-21 RX ORDER — DESLORATADINE 5 MG/1
5 TABLET ORAL DAILY
Qty: 90 | Refills: 1 | Status: DISCONTINUED | COMMUNITY
Start: 2022-11-18 | End: 2022-12-21

## 2022-12-22 ENCOUNTER — NON-APPOINTMENT (OUTPATIENT)
Age: 62
End: 2022-12-22

## 2022-12-23 ENCOUNTER — OUTPATIENT (OUTPATIENT)
Dept: OUTPATIENT SERVICES | Facility: HOSPITAL | Age: 62
LOS: 1 days | End: 2022-12-23
Payer: COMMERCIAL

## 2022-12-23 ENCOUNTER — APPOINTMENT (OUTPATIENT)
Dept: SLEEP CENTER | Facility: CLINIC | Age: 62
End: 2022-12-23

## 2022-12-23 PROCEDURE — 95810 POLYSOM 6/> YRS 4/> PARAM: CPT | Mod: 26

## 2022-12-23 PROCEDURE — 95810 POLYSOM 6/> YRS 4/> PARAM: CPT

## 2022-12-26 ENCOUNTER — FORM ENCOUNTER (OUTPATIENT)
Age: 62
End: 2022-12-26

## 2022-12-31 ENCOUNTER — EMERGENCY (EMERGENCY)
Facility: HOSPITAL | Age: 62
LOS: 1 days | Discharge: HOME | End: 2022-12-31
Attending: EMERGENCY MEDICINE
Payer: COMMERCIAL

## 2022-12-31 ENCOUNTER — NON-APPOINTMENT (OUTPATIENT)
Age: 62
End: 2022-12-31

## 2022-12-31 VITALS
DIASTOLIC BLOOD PRESSURE: 92 MMHG | SYSTOLIC BLOOD PRESSURE: 159 MMHG | HEIGHT: 67 IN | HEART RATE: 95 BPM | RESPIRATION RATE: 18 BRPM | OXYGEN SATURATION: 100 % | TEMPERATURE: 98 F | WEIGHT: 169.09 LBS

## 2022-12-31 VITALS
TEMPERATURE: 98 F | HEART RATE: 89 BPM | DIASTOLIC BLOOD PRESSURE: 98 MMHG | RESPIRATION RATE: 18 BRPM | SYSTOLIC BLOOD PRESSURE: 156 MMHG | OXYGEN SATURATION: 99 %

## 2022-12-31 LAB
A1C WITH ESTIMATED AVERAGE GLUCOSE RESULT: 5.4 % — SIGNIFICANT CHANGE UP (ref 4–5.6)
ALBUMIN SERPL ELPH-MCNC: 4.2 G/DL — SIGNIFICANT CHANGE UP (ref 3.3–5)
ALP SERPL-CCNC: 110 U/L — SIGNIFICANT CHANGE UP (ref 40–120)
ALT FLD-CCNC: 28 U/L — SIGNIFICANT CHANGE UP (ref 10–45)
ANION GAP SERPL CALC-SCNC: 10 MMOL/L — SIGNIFICANT CHANGE UP (ref 5–17)
APPEARANCE UR: CLEAR — SIGNIFICANT CHANGE UP
APTT BLD: 40.3 SEC — HIGH (ref 27.5–35.5)
AST SERPL-CCNC: 28 U/L — SIGNIFICANT CHANGE UP (ref 10–40)
BACTERIA # UR AUTO: ABNORMAL
BASOPHILS # BLD AUTO: 0.03 K/UL — SIGNIFICANT CHANGE UP (ref 0–0.2)
BASOPHILS NFR BLD AUTO: 0.6 % — SIGNIFICANT CHANGE UP (ref 0–2)
BILIRUB SERPL-MCNC: 0.2 MG/DL — SIGNIFICANT CHANGE UP (ref 0.2–1.2)
BILIRUB UR-MCNC: NEGATIVE — SIGNIFICANT CHANGE UP
BUN SERPL-MCNC: 14 MG/DL — SIGNIFICANT CHANGE UP (ref 7–23)
CALCIUM SERPL-MCNC: 9.4 MG/DL — SIGNIFICANT CHANGE UP (ref 8.4–10.5)
CHLORIDE SERPL-SCNC: 106 MMOL/L — SIGNIFICANT CHANGE UP (ref 96–108)
CO2 SERPL-SCNC: 29 MMOL/L — SIGNIFICANT CHANGE UP (ref 22–31)
COLOR SPEC: SIGNIFICANT CHANGE UP
CREAT SERPL-MCNC: 0.71 MG/DL — SIGNIFICANT CHANGE UP (ref 0.5–1.3)
DIFF PNL FLD: NEGATIVE — SIGNIFICANT CHANGE UP
EGFR: 96 ML/MIN/1.73M2 — SIGNIFICANT CHANGE UP
EOSINOPHIL # BLD AUTO: 0.19 K/UL — SIGNIFICANT CHANGE UP (ref 0–0.5)
EOSINOPHIL NFR BLD AUTO: 3.8 % — SIGNIFICANT CHANGE UP (ref 0–6)
EPI CELLS # UR: 2 /HPF — SIGNIFICANT CHANGE UP
ESTIMATED AVERAGE GLUCOSE: 108 MG/DL — SIGNIFICANT CHANGE UP (ref 68–114)
GLUCOSE SERPL-MCNC: 85 MG/DL — SIGNIFICANT CHANGE UP (ref 70–99)
GLUCOSE UR QL: NEGATIVE — SIGNIFICANT CHANGE UP
HCT VFR BLD CALC: 37.7 % — SIGNIFICANT CHANGE UP (ref 34.5–45)
HGB BLD-MCNC: 12.2 G/DL — SIGNIFICANT CHANGE UP (ref 11.5–15.5)
HYALINE CASTS # UR AUTO: 1 /LPF — SIGNIFICANT CHANGE UP (ref 0–2)
IMM GRANULOCYTES NFR BLD AUTO: 0.2 % — SIGNIFICANT CHANGE UP (ref 0–0.9)
INR BLD: 2.25 RATIO — HIGH (ref 0.88–1.16)
KETONES UR-MCNC: NEGATIVE — SIGNIFICANT CHANGE UP
LEUKOCYTE ESTERASE UR-ACNC: NEGATIVE — SIGNIFICANT CHANGE UP
LYMPHOCYTES # BLD AUTO: 1.88 K/UL — SIGNIFICANT CHANGE UP (ref 1–3.3)
LYMPHOCYTES # BLD AUTO: 37.8 % — SIGNIFICANT CHANGE UP (ref 13–44)
MAGNESIUM SERPL-MCNC: 1.9 MG/DL — SIGNIFICANT CHANGE UP (ref 1.6–2.6)
MCHC RBC-ENTMCNC: 28.2 PG — SIGNIFICANT CHANGE UP (ref 27–34)
MCHC RBC-ENTMCNC: 32.4 GM/DL — SIGNIFICANT CHANGE UP (ref 32–36)
MCV RBC AUTO: 87.3 FL — SIGNIFICANT CHANGE UP (ref 80–100)
MONOCYTES # BLD AUTO: 0.37 K/UL — SIGNIFICANT CHANGE UP (ref 0–0.9)
MONOCYTES NFR BLD AUTO: 7.4 % — SIGNIFICANT CHANGE UP (ref 2–14)
NEUTROPHILS # BLD AUTO: 2.49 K/UL — SIGNIFICANT CHANGE UP (ref 1.8–7.4)
NEUTROPHILS NFR BLD AUTO: 50.2 % — SIGNIFICANT CHANGE UP (ref 43–77)
NITRITE UR-MCNC: NEGATIVE — SIGNIFICANT CHANGE UP
NRBC # BLD: 0 /100 WBCS — SIGNIFICANT CHANGE UP (ref 0–0)
PH UR: 6.5 — SIGNIFICANT CHANGE UP (ref 5–8)
PLATELET # BLD AUTO: 250 K/UL — SIGNIFICANT CHANGE UP (ref 150–400)
POTASSIUM SERPL-MCNC: 3.7 MMOL/L — SIGNIFICANT CHANGE UP (ref 3.5–5.3)
POTASSIUM SERPL-SCNC: 3.7 MMOL/L — SIGNIFICANT CHANGE UP (ref 3.5–5.3)
PROT SERPL-MCNC: 7.4 G/DL — SIGNIFICANT CHANGE UP (ref 6–8.3)
PROT UR-MCNC: NEGATIVE — SIGNIFICANT CHANGE UP
PROTHROM AB SERPL-ACNC: 26.3 SEC — HIGH (ref 10.5–13.4)
RBC # BLD: 4.32 M/UL — SIGNIFICANT CHANGE UP (ref 3.8–5.2)
RBC # FLD: 14.5 % — SIGNIFICANT CHANGE UP (ref 10.3–14.5)
RBC CASTS # UR COMP ASSIST: 4 /HPF — SIGNIFICANT CHANGE UP (ref 0–4)
SODIUM SERPL-SCNC: 145 MMOL/L — SIGNIFICANT CHANGE UP (ref 135–145)
SP GR SPEC: 1.01 — SIGNIFICANT CHANGE UP (ref 1.01–1.02)
UROBILINOGEN FLD QL: NEGATIVE — SIGNIFICANT CHANGE UP
WBC # BLD: 4.97 K/UL — SIGNIFICANT CHANGE UP (ref 3.8–10.5)
WBC # FLD AUTO: 4.97 K/UL — SIGNIFICANT CHANGE UP (ref 3.8–10.5)
WBC UR QL: 5 /HPF — SIGNIFICANT CHANGE UP (ref 0–5)

## 2022-12-31 PROCEDURE — 80053 COMPREHEN METABOLIC PANEL: CPT

## 2022-12-31 PROCEDURE — 81001 URINALYSIS AUTO W/SCOPE: CPT

## 2022-12-31 PROCEDURE — 87086 URINE CULTURE/COLONY COUNT: CPT

## 2022-12-31 PROCEDURE — 85025 COMPLETE CBC W/AUTO DIFF WBC: CPT

## 2022-12-31 PROCEDURE — 85730 THROMBOPLASTIN TIME PARTIAL: CPT

## 2022-12-31 PROCEDURE — 83036 HEMOGLOBIN GLYCOSYLATED A1C: CPT

## 2022-12-31 PROCEDURE — 86255 FLUORESCENT ANTIBODY SCREEN: CPT

## 2022-12-31 PROCEDURE — 99284 EMERGENCY DEPT VISIT MOD MDM: CPT

## 2022-12-31 PROCEDURE — 84443 ASSAY THYROID STIM HORMONE: CPT

## 2022-12-31 PROCEDURE — 85610 PROTHROMBIN TIME: CPT

## 2022-12-31 PROCEDURE — 36415 COLL VENOUS BLD VENIPUNCTURE: CPT

## 2022-12-31 PROCEDURE — 83735 ASSAY OF MAGNESIUM: CPT

## 2022-12-31 PROCEDURE — 99283 EMERGENCY DEPT VISIT LOW MDM: CPT

## 2022-12-31 PROCEDURE — 86038 ANTINUCLEAR ANTIBODIES: CPT

## 2022-12-31 NOTE — ED PROVIDER NOTE - OBJECTIVE STATEMENT
61yo F pmh HTN, Atrial fibrillation, CHF sent to ED by PCP for skin darkening on the face as well as fingernail changes over the past 2 to 3 days.  Patient is otherwise asymptomatic with no pain or other rashes.  Darkening first noticed by daughter, PCP has not evaluated patient however given symptoms was concerned for supratherapeutic on Coumadin.  No bleeding as per patient no bruising anywhere else, denies any recent facial trauma.

## 2022-12-31 NOTE — ED PROVIDER NOTE - PHYSICAL EXAMINATION
GENERAL: non-toxic appearing, alert, in NAD  HEENT: atraumatic, normocephalic, Vision grossly intact, no conjunctivitis or discharge, hearing grossly intact,  no nasal discharge, epistaxis   CARDIAC: RRR, normal S1S2,  no appreciable murmurs, no cyanosis, cap refill < 2 seconds  PULM: normal work of breathing, oxygen saturation on RA wnl, CTAB, no crackles, rales, rhonchi, or wheezing  GI: abdomen nondistended, soft, nontender, no guarding or rebound tenderness, no palpable masses  NEURO: awake and alert, follows commands, normal speech, PERRLA, EOMI, no focal motor or sensory deficits  MSK: spine appears normal, no joint swelling or erythema, ranging all extremities with no appreciable loss of ROM  EXT: no peripheral edema, calf tenderness, redness or swelling  SKIN: darkening of skin of face with no elevation, non-tender/non-erythematous in butterfly pattern, uniform darkening of distal 1/3 of nails of both hands with no splinter hemorrhage or janeway lesions, skin otherwise warm, dry, and intact, no rashes  PSYCH: appropriate mood and affect

## 2022-12-31 NOTE — ED ADULT NURSE NOTE - OBJECTIVE STATEMENT
Pt presents for eval of new onset dark circles under both eyes over past 9 days.  No trauma or new meds.  Pt has been on Warfarin for several months as tx of a blood clot.  Pt has also noted a red-brown discoloration under her nails on her left 4th and 5th fingers.

## 2022-12-31 NOTE — ED PROVIDER NOTE - NSFOLLOWUPINSTRUCTIONS_ED_ALL_ED_FT
He was seen evaluated today for darkening of the skin of the face.  There is darkening can represent multiple things, most concerning would be lupus.    Several lab tests were sent to the pharmacy however will take some time to result.    Please follow-up with a rheumatologist discussed these labs.    You will receive a call within 2 days to help schedule an appointment with rheumatology.  Please continue taking all your other medications as prescribed    Please return to the Emergency Department should you experience any of the following:  - Chest pain, Palpitations, Painful breathing  - Worsening or persistent shortness of breath  - Fever, unexplained weight loss, night sweats  - Blood in stool or in urine  - New weakness, fatigue, or fainting  - Any new concerning symptoms

## 2022-12-31 NOTE — ED ADULT NURSE NOTE - NSIMPLEMENTINTERV_GEN_ALL_ED
Implemented All Universal Safety Interventions:  [place] to call system. Call bell, personal items and telephone within reach. Instruct patient to call for assistance. Room bathroom lighting operational. Non-slip footwear when patient is off stretcher. Physically safe environment: no spills, clutter or unnecessary equipment. Stretcher in lowest position, wheels locked, appropriate side rails in place.

## 2022-12-31 NOTE — ED PROVIDER NOTE - PROGRESS NOTE DETAILS
Reilly Allan, PGY-3: Pt reexamined at bedside, resting comfortably, vitals stable. Discussed with Dr. Earl, covering physician, primary concern was for supratherapeutic on coumadin or skin necrosis. Lesions are nontender none elevated.  In a malar pattern over the face.  More likely rheumatic in origin been related to Coumadin.  Patient is currently therapeutic with no other signs of bleeding.  Will DC to follow-up with PCP and room, room labs sent.

## 2022-12-31 NOTE — ED PROVIDER NOTE - NSDCPRINTRESULTS_ED_ALL_ED
Patient requests all Lab, Cardiology, and Radiology Results on their Discharge Instructions (2) chairfast

## 2022-12-31 NOTE — ED PROVIDER NOTE - NSFOLLOWUPCLINICS_GEN_ALL_ED_FT
[facility]  Rheumatology  [address] [address]  Phone: [phone]  Fax:

## 2022-12-31 NOTE — ED CLERICAL - NS ED CLERK NOTE PRE-ARRIVAL INFORMATION; ADDITIONAL PRE-ARRIVAL INFORMATION
CC/Reason For referral:  HTN, A-FIB, HEART FAILURE.  ON COUMADIN FOR APPROXIMATELY  8 MONTHS. HAS DEVELOPED PATCHES OF SKIN ON FACE THAT HAVE TURNED BLACK.  Preferred Consultant(if applicable):  Who admits for you (if needed):  HOSPITALIST  Do you have documents you would like to fax over?  Would you still like to speak to an ED attending?  PLEASE CALL AFTER PATIENT IS SEEN

## 2022-12-31 NOTE — ED PROVIDER NOTE - PATIENT PORTAL LINK FT
You can access the FollowMyHealth Patient Portal offered by [facility] by registering at the following website: [email or web address]. By joining [facility]’s FollowMyHealth portal, you will also be able to view your health information using other applications (apps) compatible with our system.

## 2022-12-31 NOTE — ED ADULT TRIAGE NOTE - CHIEF COMPLAINT QUOTE
patient reports several days of darkening of skin and nail changes from taking medication, has been taking Losartan and warfarin since summer

## 2022-12-31 NOTE — ED PROVIDER NOTE - ATTENDING CONTRIBUTION TO CARE
------------ATTENDING NOTE------------  pt c/o > week of gradually increasing hyperpigmentation of skin, specifically on face in malar-rash-like distribution, no trauma, not ecchymosis, nontender, no additional rash/skin changes, no h/o Lupus (does c/o joint pain), ED Sign Out 1600 pending labs/close reassessments, d/c PCP for likely dc w/ close outpt fu if all wn.  - Franklyn Tate MD   -----------------------------------------------

## 2023-01-01 LAB
CULTURE RESULTS: SIGNIFICANT CHANGE UP
SPECIMEN SOURCE: SIGNIFICANT CHANGE UP
TSH SERPL-MCNC: 0.89 UIU/ML — SIGNIFICANT CHANGE UP (ref 0.27–4.2)

## 2023-01-02 LAB — ANA TITR SER: NEGATIVE — SIGNIFICANT CHANGE UP

## 2023-01-04 LAB — DSDNA AB SER QL CLIF: NEGATIVE — SIGNIFICANT CHANGE UP

## 2023-01-06 ENCOUNTER — TRANSCRIPTION ENCOUNTER (OUTPATIENT)
Age: 63
End: 2023-01-06

## 2023-01-06 ENCOUNTER — NON-APPOINTMENT (OUTPATIENT)
Age: 63
End: 2023-01-06

## 2023-01-09 ENCOUNTER — APPOINTMENT (OUTPATIENT)
Dept: INTERNAL MEDICINE | Facility: CLINIC | Age: 63
End: 2023-01-09
Payer: COMMERCIAL

## 2023-01-09 ENCOUNTER — NON-APPOINTMENT (OUTPATIENT)
Age: 63
End: 2023-01-09

## 2023-01-09 VITALS
SYSTOLIC BLOOD PRESSURE: 114 MMHG | OXYGEN SATURATION: 99 % | HEIGHT: 67 IN | HEART RATE: 86 BPM | DIASTOLIC BLOOD PRESSURE: 80 MMHG

## 2023-01-09 PROCEDURE — 85610 PROTHROMBIN TIME: CPT | Mod: QW

## 2023-01-09 PROCEDURE — 93793 ANTICOAG MGMT PT WARFARIN: CPT

## 2023-01-09 PROCEDURE — 36416 COLLJ CAPILLARY BLOOD SPEC: CPT

## 2023-01-17 ENCOUNTER — NON-APPOINTMENT (OUTPATIENT)
Age: 63
End: 2023-01-17

## 2023-01-17 ENCOUNTER — APPOINTMENT (OUTPATIENT)
Dept: INTERNAL MEDICINE | Facility: CLINIC | Age: 63
End: 2023-01-17
Payer: COMMERCIAL

## 2023-01-17 VITALS — SYSTOLIC BLOOD PRESSURE: 98 MMHG | DIASTOLIC BLOOD PRESSURE: 62 MMHG

## 2023-01-17 VITALS
DIASTOLIC BLOOD PRESSURE: 70 MMHG | BODY MASS INDEX: 24.64 KG/M2 | OXYGEN SATURATION: 98 % | TEMPERATURE: 98.1 F | HEART RATE: 90 BPM | HEIGHT: 67 IN | WEIGHT: 157 LBS | SYSTOLIC BLOOD PRESSURE: 112 MMHG

## 2023-01-17 DIAGNOSIS — R63.0 ANOREXIA: ICD-10-CM

## 2023-01-17 DIAGNOSIS — R07.89 OTHER CHEST PAIN: ICD-10-CM

## 2023-01-17 LAB
INR PPP: 3.9 RATIO
POCT-PROTHROMBIN TIME: 46.6 SECS
QUALITY CONTROL: YES

## 2023-01-17 PROCEDURE — 99215 OFFICE O/P EST HI 40 MIN: CPT | Mod: 25

## 2023-01-17 PROCEDURE — 36416 COLLJ CAPILLARY BLOOD SPEC: CPT

## 2023-01-17 PROCEDURE — 85610 PROTHROMBIN TIME: CPT | Mod: QW

## 2023-01-17 PROCEDURE — 93000 ELECTROCARDIOGRAM COMPLETE: CPT | Mod: 59

## 2023-01-18 ENCOUNTER — INPATIENT (INPATIENT)
Facility: HOSPITAL | Age: 63
LOS: 3 days | Discharge: HOME | DRG: 552 | End: 2023-01-22
Attending: INTERNAL MEDICINE | Admitting: STUDENT IN AN ORGANIZED HEALTH CARE EDUCATION/TRAINING PROGRAM
Payer: COMMERCIAL

## 2023-01-18 VITALS
WEIGHT: 156.97 LBS | OXYGEN SATURATION: 99 % | RESPIRATION RATE: 20 BRPM | HEART RATE: 108 BPM | DIASTOLIC BLOOD PRESSURE: 67 MMHG | SYSTOLIC BLOOD PRESSURE: 118 MMHG | HEIGHT: 67 IN | TEMPERATURE: 99 F

## 2023-01-18 DIAGNOSIS — R53.1 WEAKNESS: ICD-10-CM

## 2023-01-18 DIAGNOSIS — I50.22 CHRONIC SYSTOLIC (CONGESTIVE) HEART FAILURE: ICD-10-CM

## 2023-01-18 DIAGNOSIS — I63.9 CEREBRAL INFARCTION, UNSPECIFIED: ICD-10-CM

## 2023-01-18 DIAGNOSIS — N63.0 UNSPECIFIED LUMP IN UNSPECIFIED BREAST: ICD-10-CM

## 2023-01-18 DIAGNOSIS — M54.9 DORSALGIA, UNSPECIFIED: ICD-10-CM

## 2023-01-18 DIAGNOSIS — J45.909 UNSPECIFIED ASTHMA, UNCOMPLICATED: ICD-10-CM

## 2023-01-18 DIAGNOSIS — I48.20 CHRONIC ATRIAL FIBRILLATION, UNSPECIFIED: ICD-10-CM

## 2023-01-18 DIAGNOSIS — I27.20 PULMONARY HYPERTENSION, UNSPECIFIED: ICD-10-CM

## 2023-01-18 LAB
ALBUMIN SERPL ELPH-MCNC: 3.8 G/DL — SIGNIFICANT CHANGE UP (ref 3.3–5)
ALP SERPL-CCNC: 107 U/L — SIGNIFICANT CHANGE UP (ref 40–120)
ALT FLD-CCNC: 19 U/L — SIGNIFICANT CHANGE UP (ref 10–45)
ANION GAP SERPL CALC-SCNC: 15 MMOL/L — SIGNIFICANT CHANGE UP (ref 5–17)
APTT BLD: 46.6 SEC — HIGH (ref 27.5–35.5)
AST SERPL-CCNC: 20 U/L — SIGNIFICANT CHANGE UP (ref 10–40)
BASOPHILS # BLD AUTO: 0.02 K/UL — SIGNIFICANT CHANGE UP (ref 0–0.2)
BASOPHILS NFR BLD AUTO: 0.2 % — SIGNIFICANT CHANGE UP (ref 0–2)
BILIRUB SERPL-MCNC: 0.5 MG/DL — SIGNIFICANT CHANGE UP (ref 0.2–1.2)
BUN SERPL-MCNC: 15 MG/DL — SIGNIFICANT CHANGE UP (ref 7–23)
CALCIUM SERPL-MCNC: 9.7 MG/DL — SIGNIFICANT CHANGE UP (ref 8.4–10.5)
CHLORIDE SERPL-SCNC: 100 MMOL/L — SIGNIFICANT CHANGE UP (ref 96–108)
CK SERPL-CCNC: 137 U/L — SIGNIFICANT CHANGE UP (ref 25–170)
CO2 SERPL-SCNC: 24 MMOL/L — SIGNIFICANT CHANGE UP (ref 22–31)
CREAT SERPL-MCNC: 0.79 MG/DL — SIGNIFICANT CHANGE UP (ref 0.5–1.3)
EGFR: 85 ML/MIN/1.73M2 — SIGNIFICANT CHANGE UP
EOSINOPHIL # BLD AUTO: 0.09 K/UL — SIGNIFICANT CHANGE UP (ref 0–0.5)
EOSINOPHIL NFR BLD AUTO: 0.8 % — SIGNIFICANT CHANGE UP (ref 0–6)
FLUAV AG NPH QL: SIGNIFICANT CHANGE UP
FLUBV AG NPH QL: SIGNIFICANT CHANGE UP
GAS PNL BLDV: SIGNIFICANT CHANGE UP
GLUCOSE SERPL-MCNC: 118 MG/DL — HIGH (ref 70–99)
HCT VFR BLD CALC: 36.6 % — SIGNIFICANT CHANGE UP (ref 34.5–45)
HGB BLD-MCNC: 11.9 G/DL — SIGNIFICANT CHANGE UP (ref 11.5–15.5)
HIV 1 & 2 AB SERPL IA.RAPID: SIGNIFICANT CHANGE UP
IMM GRANULOCYTES NFR BLD AUTO: 0.4 % — SIGNIFICANT CHANGE UP (ref 0–0.9)
INR BLD: 4.32 RATIO — HIGH (ref 0.88–1.16)
LYMPHOCYTES # BLD AUTO: 0.78 K/UL — LOW (ref 1–3.3)
LYMPHOCYTES # BLD AUTO: 7 % — LOW (ref 13–44)
MCHC RBC-ENTMCNC: 27.5 PG — SIGNIFICANT CHANGE UP (ref 27–34)
MCHC RBC-ENTMCNC: 32.5 GM/DL — SIGNIFICANT CHANGE UP (ref 32–36)
MCV RBC AUTO: 84.5 FL — SIGNIFICANT CHANGE UP (ref 80–100)
MONOCYTES # BLD AUTO: 0.97 K/UL — HIGH (ref 0–0.9)
MONOCYTES NFR BLD AUTO: 8.7 % — SIGNIFICANT CHANGE UP (ref 2–14)
NEUTROPHILS # BLD AUTO: 9.29 K/UL — HIGH (ref 1.8–7.4)
NEUTROPHILS NFR BLD AUTO: 82.9 % — HIGH (ref 43–77)
NRBC # BLD: 0 /100 WBCS — SIGNIFICANT CHANGE UP (ref 0–0)
PLATELET # BLD AUTO: 171 K/UL — SIGNIFICANT CHANGE UP (ref 150–400)
POTASSIUM SERPL-MCNC: 3.9 MMOL/L — SIGNIFICANT CHANGE UP (ref 3.5–5.3)
POTASSIUM SERPL-SCNC: 3.9 MMOL/L — SIGNIFICANT CHANGE UP (ref 3.5–5.3)
PROT SERPL-MCNC: 7.7 G/DL — SIGNIFICANT CHANGE UP (ref 6–8.3)
PROTHROM AB SERPL-ACNC: 50.4 SEC — HIGH (ref 10.5–13.4)
RBC # BLD: 4.33 M/UL — SIGNIFICANT CHANGE UP (ref 3.8–5.2)
RBC # FLD: 13.8 % — SIGNIFICANT CHANGE UP (ref 10.3–14.5)
RSV RNA NPH QL NAA+NON-PROBE: SIGNIFICANT CHANGE UP
SARS-COV-2 RNA SPEC QL NAA+PROBE: SIGNIFICANT CHANGE UP
SODIUM SERPL-SCNC: 139 MMOL/L — SIGNIFICANT CHANGE UP (ref 135–145)
TROPONIN T, HIGH SENSITIVITY RESULT: 7 NG/L — SIGNIFICANT CHANGE UP (ref 0–51)
WBC # BLD: 11.19 K/UL — HIGH (ref 3.8–10.5)
WBC # FLD AUTO: 11.19 K/UL — HIGH (ref 3.8–10.5)

## 2023-01-18 PROCEDURE — 72128 CT CHEST SPINE W/O DYE: CPT | Mod: 26,MA

## 2023-01-18 PROCEDURE — 99285 EMERGENCY DEPT VISIT HI MDM: CPT

## 2023-01-18 PROCEDURE — 99284 EMERGENCY DEPT VISIT MOD MDM: CPT

## 2023-01-18 PROCEDURE — 72131 CT LUMBAR SPINE W/O DYE: CPT | Mod: 26,MA

## 2023-01-18 PROCEDURE — 71275 CT ANGIOGRAPHY CHEST: CPT | Mod: 26,MA

## 2023-01-18 PROCEDURE — 70498 CT ANGIOGRAPHY NECK: CPT | Mod: 26,MA

## 2023-01-18 PROCEDURE — 70496 CT ANGIOGRAPHY HEAD: CPT | Mod: 26,MA

## 2023-01-18 PROCEDURE — 70450 CT HEAD/BRAIN W/O DYE: CPT | Mod: 26,59,MA

## 2023-01-18 PROCEDURE — 74174 CTA ABD&PLVS W/CONTRAST: CPT | Mod: 26,MA

## 2023-01-18 PROCEDURE — 99284 EMERGENCY DEPT VISIT MOD MDM: CPT | Mod: GC

## 2023-01-18 PROCEDURE — 73030 X-RAY EXAM OF SHOULDER: CPT | Mod: 26,LT

## 2023-01-18 RX ORDER — LIDOCAINE 4 G/100G
1 CREAM TOPICAL DAILY
Refills: 0 | Status: DISCONTINUED | OUTPATIENT
Start: 2023-01-18 | End: 2023-01-22

## 2023-01-18 RX ORDER — ACETAMINOPHEN 500 MG
650 TABLET ORAL EVERY 6 HOURS
Refills: 0 | Status: DISCONTINUED | OUTPATIENT
Start: 2023-01-18 | End: 2023-01-22

## 2023-01-18 RX ORDER — ALBUTEROL 90 UG/1
1 AEROSOL, METERED ORAL
Refills: 0 | Status: DISCONTINUED | OUTPATIENT
Start: 2023-01-18 | End: 2023-01-22

## 2023-01-18 RX ORDER — CYCLOBENZAPRINE HYDROCHLORIDE 10 MG/1
5 TABLET, FILM COATED ORAL THREE TIMES A DAY
Refills: 0 | Status: DISCONTINUED | OUTPATIENT
Start: 2023-01-18 | End: 2023-01-22

## 2023-01-18 RX ORDER — TIOTROPIUM BROMIDE 18 UG/1
2 CAPSULE ORAL; RESPIRATORY (INHALATION) DAILY
Refills: 0 | Status: DISCONTINUED | OUTPATIENT
Start: 2023-01-18 | End: 2023-01-22

## 2023-01-18 RX ORDER — METOPROLOL TARTRATE 50 MG
25 TABLET ORAL DAILY
Refills: 0 | Status: DISCONTINUED | OUTPATIENT
Start: 2023-01-18 | End: 2023-01-22

## 2023-01-18 RX ORDER — FUROSEMIDE 40 MG
20 TABLET ORAL DAILY
Refills: 0 | Status: DISCONTINUED | OUTPATIENT
Start: 2023-01-18 | End: 2023-01-22

## 2023-01-18 RX ORDER — LIDOCAINE 4 G/100G
1 CREAM TOPICAL ONCE
Refills: 0 | Status: COMPLETED | OUTPATIENT
Start: 2023-01-18 | End: 2023-01-18

## 2023-01-18 RX ORDER — BUDESONIDE AND FORMOTEROL FUMARATE DIHYDRATE 160; 4.5 UG/1; UG/1
2 AEROSOL RESPIRATORY (INHALATION)
Refills: 0 | Status: DISCONTINUED | OUTPATIENT
Start: 2023-01-18 | End: 2023-01-22

## 2023-01-18 RX ORDER — IBUPROFEN 200 MG
600 TABLET ORAL THREE TIMES A DAY
Refills: 0 | Status: DISCONTINUED | OUTPATIENT
Start: 2023-01-18 | End: 2023-01-22

## 2023-01-18 RX ORDER — ACETAMINOPHEN 500 MG
1000 TABLET ORAL ONCE
Refills: 0 | Status: COMPLETED | OUTPATIENT
Start: 2023-01-18 | End: 2023-01-18

## 2023-01-18 RX ADMIN — Medication 1000 MILLIGRAM(S): at 10:05

## 2023-01-18 RX ADMIN — Medication 400 MILLIGRAM(S): at 09:44

## 2023-01-18 RX ADMIN — LIDOCAINE 1 PATCH: 4 CREAM TOPICAL at 16:04

## 2023-01-18 RX ADMIN — Medication 1000 MILLIGRAM(S): at 11:12

## 2023-01-18 NOTE — H&P ADULT - PROBLEM SELECTOR PLAN 3
c/w rate control with metoprolol   Supratherapeutic INR on coumadin, hold tonight  Based on outpatient records - cards were considering ablation due to some LV dsfxn c/w rate control with metoprolol   Supratherapeutic INR on coumadin, hold tonight, will need to dose daily  Based on outpatient records - cards were considering ablation due to some LV dsfxn c/w rate control with metoprolol   Supratherapeutic INR on coumadin, hold tonight, no s/s of bleeding, will need to dose daily  Based on outpatient records - cards were considering ablation due to some LV dsfxn

## 2023-01-18 NOTE — ED ADULT NURSE NOTE - OBJECTIVE STATEMENT
0817 62 yr old BF brought in from triage via wheelchair. States unable to walk this morning. c/o back pain x 3.5 days. Weakness of left arm and right leg. denies recent falls or injury. Pt is on coumadin. A&Ox4. PERRL. Speech clear. Skin W&D. lips and nailbeds pink. States dizziness a few days ago and episode of chest pain and SOB. No c/o chest pain, SOB, palp, dizziness at present. Denies numbness or incontinence. No fever or chills. Fall risk precautions maintained. No leg swelling. +Left arm weakness and right leg weakness, Cardiac monitor applied 0817 62 yr old BF brought in from triage via wheelchair. States unable to walk this morning. c/o back pain x 3.5 days. Weakness of left arm and right leg. denies recent falls or injury. Pt is on coumadin. A&Ox4. PERRL. Speech clear. Skin W&D. lips and nailbeds pink. States dizziness a few days ago and episode of chest pain and SOB. No c/o chest pain, SOB, palp, dizziness at present. Denies numbness or incontinence. No fever or chills. Fall risk precautions maintained. No leg swelling. +Left arm weakness and right leg weakness, Cardiac monitor applied. See Neuro assessment sheet

## 2023-01-18 NOTE — H&P ADULT - NSHPPHYSICALEXAM_GEN_ALL_CORE
Vital Signs Last 24 Hrs  T(C): 36.7 (18 Jan 2023 11:33), Max: 37.2 (18 Jan 2023 08:17)  T(F): 98 (18 Jan 2023 11:33), Max: 99 (18 Jan 2023 08:17)  HR: 77 (18 Jan 2023 11:33) (75 - 108)  BP: 121/82 (18 Jan 2023 11:33) (118/67 - 138/92)  BP(mean): --  RR: 17 (18 Jan 2023 11:33) (17 - 20)  SpO2: 97% (18 Jan 2023 11:33) (95% - 99%)    Parameters below as of 18 Jan 2023 11:33  Patient On (Oxygen Delivery Method): room air        CONSTITUTIONAL: in no apparent distress, comfortable  EYES: No conjunctival or scleral injection, non-icteric;   ENMT: No external nasal lesions; nasal mucosa not inflamed; normal dentition; no pharyngeal injection or exudates, oral mucosa with moist membranes  NECK: Trachea midline without palpable neck mass; thyroid not enlarged and non-tender, no JVD ++R clavicular LN  RESPIRATORY: Breathing comfortably; lungs CTA without wheeze/rhonchi/rales  CARDIOVASCULAR: RRR, no murmurs; pedal pulses full and symmetric; no lower extremity edema  GASTROINTESTINAL: No palpable masses or tenderness, +BS throughout, nondistended  MUSCULOSKELETAL: Normal gait and station; no digital clubbing or cyanosis; no paraspinal tenderness; examination of the  (head/neck, spine/ribs/pelvis, RUE, LUE, RLE, LLE) without misalignment, normal strength and tone of extremities  SKIN: No rashes or ulcers noted; no subcutaneous nodules or induration palpable  NEUROLOGIC: 5/5 strength except slight weakness RUE, hyperreflexia 3/5 b/l   PSYCHIATRIC: A+O x 3; mood and affect appropriate; appropriate insight and judgment

## 2023-01-18 NOTE — CONSULT NOTE ADULT - ASSESSMENT
62y F with Hx afib (on coumadin), HTN, who presents w/ CC of pain/weakness    Impression: []    Recommendations:  []    Case discussed and seen at bedside w/ attending Dr. Dai 62y F with Hx afib (on coumadin), HTN, who presents w/ CC of pain/weakness    Impression: 5 days of constant back pain w/ point tenderness and no focal neurological deficits on exam. DDx musculoskeletal strain, fibromyalgia, herniated disc, minor vertebral body fracture. Would proceed w/ symptomatic treatment and appropriate work-up. Less likely to be ischemic stroke, due to negative CT and non-localizing exam.    Recommendations:  []    Case discussed and seen at bedside w/ attending Dr. Dai 62y F with Hx afib (on coumadin), HTN, who presents w/ CC of pain/weakness    Impression: 5 days of constant back pain w/ point tenderness and no focal neurological deficits on exam. DDx musculoskeletal strain, fibromyalgia, herniated disc, minor vertebral body fracture. Would proceed w/ symptomatic treatment and appropriate work-up. Less likely to be ischemic stroke, due to negative CT and non-localizing exam.    Recommendations:  [] s/p 1x Ofirimev in ED  [] agree w/ daily lidocaine patch  [] PT/OT evaluation  [] PRN ibuprofen 200mg q6h for pain  [] PRN Flexeril 5mg q6h for spasms  [] MR cervical/lumbar spine w/o contrast  [] fall precautions  [] check vitamin B1, B6, B12, folate, D, E  [] will continue to follow    Case discussed and seen at bedside w/ attending Dr. Dai 62y F with Hx afib (on coumadin), HTN, who presents w/ CC of pain/weakness    Impression: 5 days of constant back pain w/ point tenderness and no focal neurological deficits on exam. DDx musculoskeletal strain, fibromyalgia, herniated disc, minor vertebral body fracture. Would proceed w/ symptomatic treatment and appropriate work-up. Less likely to be ischemic stroke, due to negative CT and non-localizing exam.    Recommendations:  [] s/p 1x Ofirimev in ED  [] agree w/ daily lidocaine patch  [] PT/OT evaluation  [] PRN ibuprofen 600-800mg q8h for pain  [] PRN Flexeril 5-10mg q8h for spasms  [] MR cervical/lumbar spine w/o contrast  [] fall precautions  [] check vitamin B1, B6, B12, folate, D, E  [] will continue to follow    Case discussed and seen at bedside w/ attending Dr. Dai

## 2023-01-18 NOTE — REVIEW OF SYSTEMS
[Chest Pain] : chest pain [Orthopnea] : orthopnea [Shortness Of Breath] : shortness of breath [Dyspnea on Exertion] : dyspnea on exertion [Fever] : no fever [Chills] : no chills [FreeTextEntry5] : sharp pain in her right side of her chest started last night lasted until this morning.  Accompanied with shortness of breath and worsening shortness of breath lying down.  [FreeTextEntry6] : using rescue inhaler intermittently, due for refills but pharmacy didn't have it ready for .

## 2023-01-18 NOTE — CONSULT NOTE ADULT - CONSULT REASON
weakness/pain What Type Of Note Output Would You Prefer (Optional)?: Standard Output Is This A New Presentation, Or A Follow-Up?: Rash Additional History: Pt reports the rash only appears on her mid back and insides of the ears that is itchy.  She states she was concerned she could be having a reaction to the Humira she is taking for ankylosing spondylitis.  She has been on the medication for 5 years now, without prior adverse event. She has only treated the rash with rubbing alcohol that showed no improvement. The rash is improved greatly today but some areas are still present on her posterior neck. She has not been on topical or systemic steroids. She denies exogenous androgens, and is up to date with gyn care.

## 2023-01-18 NOTE — H&P ADULT - ASSESSMENT
62yoF hx mild LV dyfxn, mild pulm HTN, afib on coumadin (due to cost), HTN, asthma presents with acute lower back pain with ?weakness also found with 2 R breast nodules, and enlarged R supraclavicular LN

## 2023-01-18 NOTE — CONSULT NOTE ADULT - ATTENDING COMMENTS
HPI as per resident note, personally verified by me. Patient with cervical and lower back pain since 1/14 and radiation into her LUE and b/l thighs. She feels weak in those limbs when pain exacerbates, particularly with movement. Denies any numbness or focal neurologic deficits elsewhere. Some mild improvement in pain with Tylenol 1g IV. Right sided breast nodules and enlarging supraclavicular found on CT scan.    Neurologic exam as per resident note with additions as below:  AAO x3, speech fluent  CN's II-XII intact  Strength 5/5 all except for LUE  4+/5 (pain limited)  Sens intact all  FtN intact b/l  DTR's 3+ biceps/triceps b/l, 2+ and brisk BR b/l, 3+ KJ b/l, 2+ AJ b/l, and downgoing b/l plantar response  Straight leg raise test (-) b/l    A/P:  Weakness  Lower back pain with likely lumbar radiculopathy  Possible cervical radiculopathy  Atrial fibrillation on coumadin  HTN  Breast nodules    - BLE/LBP and LUE pain and weakness localizes most to musculoskeletal systems or peripheral nervous system/radiculopathy given description and distribution. Could consider compressive, toxic/metabolic, or even neoplastic (given right breast nodules) as etiology. Mildly improved with pain control  - MRI c-spine and l-spine w/ and w/o to assess for above  - Pain control with ibuprofen 600-800mg PO TID prn and cyclobenzaprine 5-10mg PO TID prn  - PT/OT as tolerated  - Continue to address above medical problems, as you are doing  - Will continue to follow patient with you if she remains in hospital (may request transfer to [facility])

## 2023-01-18 NOTE — ED PROVIDER NOTE - OBJECTIVE STATEMENT
63 yo female with pmh htn, afib (on 5mg coumadin daily) here for back pain , L arm and R leg pain/weakness x 3 days. Pt states she noticed pain 3 days ago, denies trauma or falls which has been worsening until today when she was having difficulty ambulating 2/2 symptoms. She denies numbness, tingling, abdominal pain, chest pain, shortness of breath, difficulty breathing, changes in vision, blood in stools or in her urine. Has never had similar symptoms before. No fevers or chills, no nausea, vomiting. Has not taken any medications for the symptoms.

## 2023-01-18 NOTE — H&P ADULT - PROBLEM SELECTOR PLAN 1
Acute lower back (mid) pain with radiation to R leg  Differential includes disc herniation, MSK muscle spasm, metastatic lesion?  Neuro input appreciated - exam wnl for them except slight LUE weakness (may be limited due to pain) and hyper reflexes which are symmetric  pan CT negative for fracture, cord compression  Will check MRI with/without contrast of C spine and L spine to eval for herniated disc, cord compression, ?metastatic disease (in setting of 2 breast nodules and R supraclavicular LN)  for now pain management with motrin 600mg TID and lidoderm patch, tylenol, flexeril PRN  Neuro checks q4hrs

## 2023-01-18 NOTE — H&P ADULT - NSICDXPASTMEDICALHX_GEN_ALL_CORE_FT
PAST MEDICAL HISTORY:  Asthma     Chronic atrial fibrillation     Chronic systolic congestive heart failure     COVID-19 vaccine series completed W #2 boosters    HTN (hypertension)     Mild pulmonary hypertension

## 2023-01-18 NOTE — H&P ADULT - PROBLEM SELECTOR PLAN 2
Mildly enlarged right supraclavicular lymph node measures 1.4 x 1.2 cm (10:28), nonspecific yet increased in size   compared to prior exam. Right superior medial breast ill-defined attenuation measures approximately 1.4 cm (10:135), in region of previously described 2.3 cm nodule. Similar right lateral breast nodule measures 0.8 cm.   Patient says she has not had a mammography, findings concerning for malignancy  check MRI of R breast with/without contrast    Also... on last outpatient visit - patient was referred for EGD/colonoscopy (family history of gastric ca) due to weight loss Mildly enlarged right supraclavicular lymph node measures 1.4 x 1.2 cm (10:28), nonspecific yet increased in size   compared to prior exam. Right superior medial breast ill-defined attenuation measures approximately 1.4 cm (10:135), in region of previously described 2.3 cm nodule. Similar right lateral breast nodule measures 0.8 cm.   Patient says she has not had a mammography, findings concerning for malignancy  check MRI of R breast with/without contrast  If suspicious for malignancy, consider biopsy of supraclavicular LN  Also... on last outpatient visit - patient was referred for EGD/colonoscopy (family history of gastric ca) due to weight loss Mildly enlarged right supraclavicular lymph node measures 1.4 x 1.2 cm (10:28), nonspecific yet increased in size   compared to prior exam. Right superior medial breast ill-defined attenuation measures approximately 1.4 cm (10:135), in region of previously described 2.3 cm nodule. Similar right lateral breast nodule measures 0.8 cm.   Patient says she has not had a mammography, findings concerning for malignancy  check MRI of R breast with/without contrast  If suspicious for malignancy, consider biopsy of supraclavicular LN  Also... on last outpatient visit - patient was referred for EGD/colonoscopy (family history of gastric ca) due to weight loss  d/w radiology - inpatient breast MRIs only done for those needing anesthesia-will need approval from radiology tomorrow (1/19). Will need to follow up with radiology in am

## 2023-01-18 NOTE — H&P ADULT - PROBLEM SELECTOR PLAN 7
Cerebral hemispheric white matter lesions consistent with ischemic white matter disease, somewhat advanced   for age.     Unclear significance. Should likely be on statin

## 2023-01-18 NOTE — H&P ADULT - HISTORY OF PRESENT ILLNESS
62yoF hx afib on coumadin, asthma, HTN, who presents w/ CC of back pain. Patient started having symptoms last Saturday (1/14). No obvious trigger or injury. The pain is mostly in the lower back (midline) and occasionally radiates into the b/l thighs. Never had pain like this before. Tried using Bengay cream, which did not help. Pain is worse with walking or rolling over in bed. She is having difficulty walking. Denies numbness, tingling, or focal weakness. No bowel or bladder changes. No headaches, slurred speech, or vision changes. No recent medication changes. Her daily medications include losartan, HCTZ, warfarin, albuterol, Ellipta. No history of surgeries. Family history positive for brain aneurysm in sister. ROS negative for cough, dysuria, diarrhea.  She works as a  and is on her feet a lot.   Patient has never had a mammography or colonoscopy  Family history significant for gastric cancer  In the ED, vitals have been within normal limits. Received 1x Ofirimev and lidocaine patch. CBC, CMP, TSH, A1c, CK wnl. RVP neg. CTH/A unremarkable. CT thoracic/lumbar spine showing degenerative changes, no fractures.   62yoF hx afib on coumadin, asthma, HTN, who presents w/ CC of back pain. Patient started having symptoms last Saturday (1/14). No obvious trigger or injury. The pain is mostly in the lower back (midline) and occasionally radiates into the b/l thighs. Never had pain like this before. Tried using Bengay cream, which did not help. Pain is worse with walking or rolling over in bed. She is having difficulty walking. Denies numbness, tingling, or focal weakness. No bowel or bladder changes. No headaches, slurred speech, or vision changes. No recent medication changes. Her daily medications include metoprolol, warfarin, albuterol, Ellipta, lasix. No history of surgeries. Family history positive for brain aneurysm in sister and gastric ca in parent. ROS negative for cough, dysuria, diarrhea.  She works as a  and is on her feet a lot.   Patient has never had a mammography or colonoscopy  Family history significant for gastric cancer  In the ED, vitals have been within normal limits. Received 1x Ofirimev and lidocaine patch. CBC, CMP, TSH, A1c, CK wnl. RVP neg. CTH/A unremarkable. CT thoracic/lumbar spine showing degenerative changes, no fractures.   62yoF hx afib on coumadin, asthma, HTN, who presents w/ CC of back pain. Patient started having symptoms last Saturday (1/14). No obvious trigger or injury. The pain is mostly in the lower back (midline) and occasionally radiates into the b/l thighs. Never had pain like this before. Tried using Bengay cream, which did not help. Pain is worse with walking or rolling over in bed. She is having difficulty walking. Denies numbness, tingling, or focal weakness. No bowel or bladder changes. No headaches, slurred speech, or vision changes. No recent medication changes. Her daily medications include metoprolol, warfarin, albuterol, Ellipta, lasix. No history of surgeries. Family history positive for brain aneurysm in sister and gastric ca in parent. ROS negative for cough, dysuria, diarrhea.  She works as a  and is on her feet a lot.   Patient has never had a mammography or colonoscopy  Of note... patient's daughter has noticed darkening of her skin (especially of her face) in recent months  Family history significant for gastric cancer  In the ED, vitals have been within normal limits. Received 1x Ofirimev and lidocaine patch. CBC, CMP, TSH, A1c, CK wnl. RVP neg. CTH/A unremarkable. CT thoracic/lumbar spine showing degenerative changes, no fractures.

## 2023-01-18 NOTE — ED PROVIDER NOTE - ATTENDING APP SHARED VISIT CONTRIBUTION OF CARE
RGUJRAL 62-year-old female history of hypertension on Coumadin for A. fib atrial fibrillation presents today with diffuse back pain since Saturday and left upper extremity and right lower extremity weakness since yesterday.  Patient denies any trauma, headache, dizziness, nausea vomiting, chest pain or abdominal pain, fever or chills.  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  States pain started suddenly and has been progressive since.  Patient now notes left upper extremity weakness and has to assist her arm and right lower extremity weakness.  Denies any numbness or tingling.    On exam, Patient is awake, alert and oriented x 3  NCAT  Neck is supple  Lungs are CTA B/L,+S1S2  Abdomen:Soft nd/nt+bs no rebound or guarding.  Back: + T12 - L2 ttp. + radial pulse equal b/l.   Extremity no edema or calf tender.  Skin with no rash.  Neuro CN3-12 intact. LUE 3+/5, RLE 3+/5, RUE, LLE 5/5.  Patient's last normal for weakness was yesterday.  Check labs, CTA to eval for CVA vs dissection, ACS, fracture. Pain control and monitor.

## 2023-01-18 NOTE — H&P ADULT - PROBLEM SELECTOR PLAN 4
mild LV dsfxn likely due to chronic afib  c/w lasix  Last TTE (recent) improved LV fxn to 55% (in 4/22 EF 45%)  Not currently on ACE/ARB (due to dizziness)

## 2023-01-18 NOTE — H&P ADULT - NSHPLABSRESULTS_GEN_ALL_CORE
11.9   11.19 )-----------( 171      ( 18 Jan 2023 09:19 )             36.6       01-18    139  |  100  |  15  ----------------------------<  118<H>  3.9   |  24  |  0.79    Ca    9.7      18 Jan 2023 09:19  Phos  4.2     01-18  Mg     1.9     01-18    TPro  7.7  /  Alb  3.8  /  TBili  0.5  /  DBili  x   /  AST  20  /  ALT  19  /  AlkPhos  107  01-18      PT/INR - ( 18 Jan 2023 09:19 )   PT: 50.4 sec;   INR: 4.32 ratio         PTT - ( 18 Jan 2023 09:19 )  PTT:46.6 sec    CARDIAC MARKERS ( 18 Jan 2023 09:19 )  x     / x     / 137 U/L / x     / x              Troponin T, High Sensitivity Result: 7 ng/L (01-18-23 @ 09:19)    Personally reviewed EKG - wnl    CT brain Brain:   No infarct is identified.   Cerebral hemispheric white matter   lesions consistent with ischemic white matter disease, somewhat advanced   for age. Diffuse brain volume loss typical for age. Consider MR   evaluation of the clinical findings    CT chest Mildly enlarged right supraclavicular lymph   node measures 1.4 x 1.2 cm (10:28), nonspecific yet increased in size   compared to prior exam. Right superior medial breast ill-defined   attenuation measures approximately 1.4 cm (10:135), in region of   previously described 2.3 cm nodule. Similar right lateral breast nodule   measures 0.8 cm. Bilateral subcentimeter hypoattenuating thyroid lesions,   largest in the left lobe measures 0.8 cm.

## 2023-01-18 NOTE — ED PROVIDER NOTE - PHYSICAL EXAMINATION
A&Ox3, NAD, well appearing  Lungs CTAB. No w/r/r  Cardiac +S1S2, RRR, No m/r/g.   Abd soft, NT/ND, +BS, no rebound or guarding.   Extremities: cap refill <2, pulses in distal extremities 4+, no edema.   Skin without rash.   No focal Deficits, Strength 5/5 RUE/LLE. 3+ ANGELY/RLE,+ vertebral ttp of T12

## 2023-01-18 NOTE — ED ADULT NURSE REASSESSMENT NOTE - NS ED NURSE REASSESS COMMENT FT1
1340 Neuro eval pt. Pt moving left arm and right leg. Left arm and right leg remain weak, less than earlier. Pt A&Ox4. Denies HA or dizziness. Less back pain than earlier. Fall risk precautions maintained

## 2023-01-18 NOTE — DATA REVIEWED
[FreeTextEntry1] : EKG obtained today, noted to be unchanged from previous one.  No significant findings of concern.

## 2023-01-18 NOTE — CONSULT NOTE ADULT - SUBJECTIVE AND OBJECTIVE BOX
Neurology - Consult Note    -  Spectra: [phone] ([facility]), [phone] ([facility])  -    HPI: Patient ROSANGELA CHAMPION is a 62y (1960) woman with a PMHx significant for afib (on coumadin), HTN, who presents w/ CC of L arm and R leg weakness. []      Review of Systems:  INCOMPLETE   CONSTITUTIONAL: No fevers or chills  EYES AND ENT: No visual changes or no throat pain   NECK: No pain or stiffness  RESPIRATORY: No hemoptysis or shortness of breath  CARDIOVASCULAR: No chest pain or palpitations  GASTROINTESTINAL: No melena or hematochezia  GENITOURINARY: No dysuria or hematuria  NEUROLOGICAL: +As stated in HPI above  SKIN: No itching, burning, rashes, or lesions   All other review of systems is negative unless indicated above.    Allergies:      PMHx/PSHx/Family Hx: As above, otherwise see below   No pertinent past medical history    COVID-19 vaccine series completed    HTN (hypertension)    Asthma    H/O aneurysm        Social Hx:  No current use of tobacco, alcohol, or illicit drugs  Lives with ***    Medications:  MEDICATIONS  (STANDING):    MEDICATIONS  (PRN):      Vitals:  T(C): 36.7 (01-18-23 @ 11:33), Max: 37.2 (01-18-23 @ 08:17)  HR: 77 (01-18-23 @ 11:33) (75 - 108)  BP: 121/82 (01-18-23 @ 11:33) (118/67 - 138/92)  RR: 17 (01-18-23 @ 11:33) (17 - 20)  SpO2: 97% (01-18-23 @ 11:33) (95% - 99%)    Physical Examination: INCOMPLETE  General - NAD  Cardiovascular - Peripheral pulses palpable, no edema  Eyes - Fundoscopy with flat, sharp optic discs and no hemorrhage or exudates; Fundoscopy not well visualized; Fundoscopy not performed due to safety precautions in the setting of the COVID-19 pandemic    Neurologic Exam:  Mental status - Awake, Alert, Oriented to person, place, and time. Speech fluent, repetition and naming intact. Follows simple and complex commands. Attention/concentration, recent and remote memory (including registration and recall), and fund of knowledge intact    Cranial nerves - PERRLA, VFF, EOMI, face sensation (V1-V3) intact b/l, facial strength intact without asymmetry b/l, hearing intact b/l, palate with symmetric elevation, trapezius OR sternocleidomastiod 5/5 strength b/l, tongue midline on protrusion with full lateral movement    Motor - Normal bulk and tone throughout. No pronator drift.  Strength testing            Deltoid      Biceps      Triceps     Wrist Extension    Wrist Flexion     Interossei         R            5                 5               5                     5                              5                        5                 5  L             5                 5               5                     5                              5                        5                 5              Hip Flexion    Hip Extension    Knee Flexion    Knee Extension    Dorsiflexion    Plantar Flexion  R              5                           5                       5                           5                            5                          5  L              5                           5                        5                           5                            5                          5    Sensation - Light touch/temperature OR pain/vibration intact throughout    DTR's -             Biceps      Triceps     Brachioradialis      Patellar    Ankle    Toes/plantar response  R             2+             2+                  2+                       2+            2+                 Down  L              2+             2+                 2+                        2+           2+                 Down    Coordination - Finger to Nose intact b/l. No tremors appreciated    Gait and station - Normal casual gait. Romberg (-)    Labs:                        11.9   11.19 )-----------( 171      ( 18 Jan 2023 09:19 )             36.6     01-18    139  |  100  |  15  ----------------------------<  118<H>  3.9   |  24  |  0.79    Ca    9.7      18 Jan 2023 09:19    TPro  7.7  /  Alb  3.8  /  TBili  0.5  /  DBili  x   /  AST  20  /  ALT  19  /  AlkPhos  107  01-18    CAPILLARY BLOOD GLUCOSE        LIVER FUNCTIONS - ( 18 Jan 2023 09:19 )  Alb: 3.8 g/dL / Pro: 7.7 g/dL / ALK PHOS: 107 U/L / ALT: 19 U/L / AST: 20 U/L / GGT: x             PT/INR - ( 18 Jan 2023 09:19 )   PT: 50.4 sec;   INR: 4.32 ratio         PTT - ( 18 Jan 2023 09:19 )  PTT:46.6 sec                    Radiology:  CT Head No Cont:  (18 Jan 2023 09:25)  1.   Right carotid system:    No hemodynamically significant stenosis.    2.   Left carotid system:     No hemodynamically significant stenosis.    3.   Vertebral circulation:    Patent.    4.  Anterior intracranial circulation:     Unremarkable.    5.  Posterior intracranial circulation:    Unremarkable.    6.  No large vessel occlusion.    7. Brain:   No infarct is identified.   Cerebral hemispheric white matter   lesions consistent with ischemic white matter disease, somewhat advanced   for age. Diffuse brain volume loss typical for age. Consider MR   evaluation of the clinical findings    CT thoracic/lumbar spine:  No acute fractures or dislocations are seen.    Degenerative changes as described above    If symptoms continue MRI can be done for further evaluation if there are   no contraindications.       Neurology - Consult Note    -  Spectra: [phone] ([facility]), [phone] ([facility])  -    HPI: Patient ROSANGELA CHAMPION is a 62y (1960) woman with a PMHx significant for asthma, afib (on coumadin), HTN, who presents w/ CC of back pain. Patient started having symptoms last Saturday (1/14). No obvious trigger or injury. She works as a  and is on her feet a lot. The pain is mostly in the lower back (midline) and occasionally radiates into the b/l thighs. Never had pain like this before. Tried using Bengay cream, which did not help. Pain is worse with walking or rolling over in bed. She is having difficulty walking. Denies numbness, tingling, or focal weakness. No bowel or bladder changes. No headaches, slurred speech, or vision changes. No recent medication changes. Her daily medications include losartan, HCTZ, warfarin, albuterol, Ellipta. No history of surgeries. Family history positive for brain aneurysm in sister. ROS negative for cough, dysuria, diarrhea.    In the ED, vitals have been within normal limits. Received 1x Ofirimev and lidocaine patch. CBC, CMP, TSH, A1c, CK wnl. RVP neg. CTH/A unremarkable. CT thoracic/lumbar spine showing degenerative changes, no fractures.      Review of Systems:    CONSTITUTIONAL: No fevers or chills  EYES AND ENT: No visual changes or no throat pain   NECK: No pain or stiffness  RESPIRATORY: No hemoptysis or shortness of breath  CARDIOVASCULAR: No chest pain or palpitations  GASTROINTESTINAL: No melena or hematochezia  GENITOURINARY: No dysuria or hematuria  NEUROLOGICAL: +As stated in HPI above  SKIN: No itching, burning, rashes, or lesions   All other review of systems is negative unless indicated above.    Allergies:      PMHx/PSHx/Family Hx: As above, otherwise see below   No pertinent past medical history    COVID-19 vaccine series completed    HTN (hypertension)    Asthma    H/O aneurysm        Social Hx:  No current use of tobacco, alcohol, or illicit drugs  Independent in ADLs, no assistive devices to ambulate    Medications:  MEDICATIONS  (STANDING):    MEDICATIONS  (PRN):      Vitals:  T(C): 36.7 (01-18-23 @ 11:33), Max: 37.2 (01-18-23 @ 08:17)  HR: 77 (01-18-23 @ 11:33) (75 - 108)  BP: 121/82 (01-18-23 @ 11:33) (118/67 - 138/92)  RR: 17 (01-18-23 @ 11:33) (17 - 20)  SpO2: 97% (01-18-23 @ 11:33) (95% - 99%)    Physical Examination:   General - NAD  Cardiovascular - Peripheral pulses palpable, no edema  Eyes - Fundoscopy not performed due to safety precautions in the setting of the COVID-19 pandemic    Neurologic Exam:  Mental status - Awake, Alert, Oriented to person, place, and time. Speech fluent, repetition and naming intact. Follows simple and complex commands. Attention/concentration, recent and remote memory (including registration and recall), and fund of knowledge intact    Cranial nerves - PERRLA, VFF, EOMI, face sensation (V1-V3) intact b/l, facial strength intact without asymmetry b/l, hearing intact b/l, palate with symmetric elevation, trapezius 5/5 strength b/l, tongue midline on protrusion with full lateral movement    Motor - Normal bulk and tone throughout. No pronator drift.  Strength testing            Deltoid      Biceps      Triceps     Wrist Extension    Wrist Flexion     Interossei         R            5                 5               5                     5                              5                        5                 5  L             5                 5               5                     5                              5                        5                 5              Hip Flexion    Hip Extension    Knee Flexion    Knee Extension    Dorsiflexion    Plantar Flexion  R              5                           5                       5                           5                            5                          5  L              5                           5                        5                           5                            5                          5    Sensation - Light touch/temperature intact throughout    DTR's -             Biceps      Triceps     Brachioradialis      Patellar    Ankle    Toes/plantar response  R             2+             2+                  2+                       2+            2+                 Down  L              2+             2+                 2+                        2+           2+                 Down    Coordination - Finger to Nose intact b/l. No tremors appreciated    Gait and station - slow antalgic gait, leaning on bed for support, stands unassisted    MSK - TTP of midline L4/5, no paraspinal tenderness. Straight leg raise negative b/l. Rolls onto side unassisted, but with great hesitation and grimacing due to pain.    Labs:                        11.9   11.19 )-----------( 171      ( 18 Jan 2023 09:19 )             36.6     01-18    139  |  100  |  15  ----------------------------<  118<H>  3.9   |  24  |  0.79    Ca    9.7      18 Jan 2023 09:19    TPro  7.7  /  Alb  3.8  /  TBili  0.5  /  DBili  x   /  AST  20  /  ALT  19  /  AlkPhos  107  01-18    CAPILLARY BLOOD GLUCOSE        LIVER FUNCTIONS - ( 18 Jan 2023 09:19 )  Alb: 3.8 g/dL / Pro: 7.7 g/dL / ALK PHOS: 107 U/L / ALT: 19 U/L / AST: 20 U/L / GGT: x             PT/INR - ( 18 Jan 2023 09:19 )   PT: 50.4 sec;   INR: 4.32 ratio         PTT - ( 18 Jan 2023 09:19 )  PTT:46.6 sec                    Radiology:  CT Head No Cont:  (18 Jan 2023 09:25)  1.   Right carotid system:    No hemodynamically significant stenosis.    2.   Left carotid system:     No hemodynamically significant stenosis.    3.   Vertebral circulation:    Patent.    4.  Anterior intracranial circulation:     Unremarkable.    5.  Posterior intracranial circulation:    Unremarkable.    6.  No large vessel occlusion.    7. Brain:   No infarct is identified.   Cerebral hemispheric white matter   lesions consistent with ischemic white matter disease, somewhat advanced   for age. Diffuse brain volume loss typical for age. Consider MR   evaluation of the clinical findings    CT thoracic/lumbar spine:  No acute fractures or dislocations are seen.    Degenerative changes as described above    If symptoms continue MRI can be done for further evaluation if there are   no contraindications.       Neurology - Consult Note    -  Spectra: [phone] ([facility]), [phone] ([facility])  -    HPI: Patient ROSANGELA CHAMPION is a 62y (1960) woman with a PMHx significant for asthma, afib (on coumadin), HTN, who presents w/ CC of back pain. Patient started having symptoms since Saturday (1/14). No obvious trigger or injury. She works as a  and is on her feet a lot. The pain is mostly in the lower back (midline) and occasionally radiates into the b/l thighs. Never had pain like this before. Tried using Bengay cream, which did not help. Pain is worse with walking or rolling over in bed. She is having difficulty walking. Denies numbness, tingling, or focal weakness. No bowel or bladder changes. No headaches, slurred speech, or vision changes. No recent medication changes. Her daily medications include losartan, HCTZ, warfarin, albuterol, Ellipta. No history of surgeries. Family history positive for brain aneurysm in sister. ROS negative for cough, dysuria, diarrhea.    In the ED, vitals have been within normal limits. Received 1x Ofirimev and lidocaine patch. CBC, CMP, TSH, A1c, CK wnl. RVP neg. CTH/A unremarkable. CT thoracic/lumbar spine showing degenerative changes, no fractures.      Review of Systems:    CONSTITUTIONAL: No fevers or chills  EYES AND ENT: No visual changes or no throat pain   NECK: No pain or stiffness  RESPIRATORY: No hemoptysis or shortness of breath  CARDIOVASCULAR: No chest pain or palpitations  GASTROINTESTINAL: No melena or hematochezia  GENITOURINARY: No dysuria or hematuria  NEUROLOGICAL: +As stated in HPI above  SKIN: No itching, burning, rashes, or lesions   All other review of systems is negative unless indicated above.    Allergies:  NKDA    PMHx/PSHx/Family Hx: As above, otherwise see below   No pertinent past medical history    COVID-19 vaccine series completed    HTN (hypertension)    Asthma    H/O aneurysm        Social Hx:  No current use of tobacco, alcohol, or illicit drugs  Independent in ADLs, no assistive devices to ambulate    Medications:  MEDICATIONS  (STANDING):    MEDICATIONS  (PRN):      Vitals:  T(C): 36.7 (01-18-23 @ 11:33), Max: 37.2 (01-18-23 @ 08:17)  HR: 77 (01-18-23 @ 11:33) (75 - 108)  BP: 121/82 (01-18-23 @ 11:33) (118/67 - 138/92)  RR: 17 (01-18-23 @ 11:33) (17 - 20)  SpO2: 97% (01-18-23 @ 11:33) (95% - 99%)    Physical Examination:   General - NAD  Cardiovascular - Peripheral pulses palpable, no edema  Eyes - Fundoscopy not performed due to safety precautions in the setting of the COVID-19 pandemic    Neurologic Exam:  Mental status - Awake, Alert, Oriented to person, place, and time. Speech fluent, repetition and naming intact. Follows simple and complex commands. Attention/concentration, recent and remote memory (including registration and recall), and fund of knowledge intact    Cranial nerves - PERRLA, VFF, EOMI, face sensation (V1-V3) intact b/l, facial strength intact without asymmetry b/l, hearing intact b/l, palate with symmetric elevation, trapezius 5/5 strength b/l, tongue midline on protrusion with full lateral movement    Motor - Normal bulk and tone throughout. No pronator drift.  Strength testing            Deltoid      Biceps      Triceps     Wrist Extension    Wrist Flexion     Interossei         R            5                 5               5                     5                              5                        5                 5  L             5                 5               5                     5                              5                        5                 5              Hip Flexion    Hip Extension    Knee Flexion    Knee Extension    Dorsiflexion    Plantar Flexion  R              5                           5                       5                           5                            5                          5  L              5                           5                        5                           5                            5                          5    Sensation - Light touch/temperature intact throughout    DTR's -             Biceps      Triceps     Brachioradialis      Patellar    Ankle    Toes/plantar response  R             2+             2+                  2+                       2+            2+                 Down  L              2+             2+                 2+                        2+           2+                 Down    Coordination - Finger to Nose intact b/l. No tremors appreciated    Gait and station - slow antalgic gait, leaning on bed for support, stands unassisted    MSK - TTP of midline L4/5, no paraspinal tenderness. Straight leg raise negative b/l. Rolls onto side unassisted, but with great hesitation and grimacing due to pain.    Labs:                        11.9   11.19 )-----------( 171      ( 18 Jan 2023 09:19 )             36.6     01-18    139  |  100  |  15  ----------------------------<  118<H>  3.9   |  24  |  0.79    Ca    9.7      18 Jan 2023 09:19    TPro  7.7  /  Alb  3.8  /  TBili  0.5  /  DBili  x   /  AST  20  /  ALT  19  /  AlkPhos  107  01-18    CAPILLARY BLOOD GLUCOSE        LIVER FUNCTIONS - ( 18 Jan 2023 09:19 )  Alb: 3.8 g/dL / Pro: 7.7 g/dL / ALK PHOS: 107 U/L / ALT: 19 U/L / AST: 20 U/L / GGT: x             PT/INR - ( 18 Jan 2023 09:19 )   PT: 50.4 sec;   INR: 4.32 ratio         PTT - ( 18 Jan 2023 09:19 )  PTT:46.6 sec                    Radiology:  CT Head No Cont:  (18 Jan 2023 09:25)  1.   Right carotid system:    No hemodynamically significant stenosis.    2.   Left carotid system:     No hemodynamically significant stenosis.    3.   Vertebral circulation:    Patent.    4.  Anterior intracranial circulation:     Unremarkable.    5.  Posterior intracranial circulation:    Unremarkable.    6.  No large vessel occlusion.    7. Brain:   No infarct is identified.   Cerebral hemispheric white matter   lesions consistent with ischemic white matter disease, somewhat advanced   for age. Diffuse brain volume loss typical for age. Consider MR   evaluation of the clinical findings    CT thoracic/lumbar spine:  No acute fractures or dislocations are seen.    Degenerative changes as described above    If symptoms continue MRI can be done for further evaluation if there are   no contraindications.

## 2023-01-18 NOTE — PHYSICAL EXAM
[No Acute Distress] : no acute distress [Normal Voice/Communication] : normal voice/communication [No Accessory Muscle Use] : no accessory muscle use [Clear to Auscultation] : lungs were clear to auscultation bilaterally [No Murmur] : no murmur heard [de-identified] : slightly short of breath [de-identified] : irregular heart rate [de-identified] : patient wearing facial makeup, noted dark hyperpigmentation lesions around make up, noted it primarily around cheeks, near mask.   [de-identified] : Patient reporting not recalling much information, suggesting poor memory.

## 2023-01-18 NOTE — HISTORY OF PRESENT ILLNESS
[FreeTextEntry1] : dizzy [de-identified] : Dizzy at work but reports that it started while picking up her medication at the pharmacy. She went to work soon after.\maximino Felt funny at work, nurse checked her blood pressure and found it low, but patient doesn't know what the reading was. \par \maximino Eats only at 8 am, no lunch break is given.  Finishes work at 1:30.  Eats dinner around 8 pm due to not having much appetite.  Dinner usually is another yogurt.  During the day will have pieces of fruit.  Doesn't have the financial means, but has help from her sister who will cook for her and give her food. \par \maximino SIster lives in [place] had an aneurysm but lives with another sister and brother who are both sick.  Sister asked to join during the end of the visit.  Sister stating that she has seen darkness on patient's face.  Sister also wants to know if this provider is aware of the family history of irregular heart rate that exists in the family.

## 2023-01-19 ENCOUNTER — NON-APPOINTMENT (OUTPATIENT)
Age: 63
End: 2023-01-19

## 2023-01-19 LAB
ALBUMIN SERPL ELPH-MCNC: 3.4 G/DL — SIGNIFICANT CHANGE UP (ref 3.3–5)
ALP SERPL-CCNC: 109 U/L — SIGNIFICANT CHANGE UP (ref 40–120)
ALT FLD-CCNC: 19 U/L — SIGNIFICANT CHANGE UP (ref 10–45)
ANION GAP SERPL CALC-SCNC: 12 MMOL/L — SIGNIFICANT CHANGE UP (ref 5–17)
APTT BLD: 46.1 SEC — HIGH (ref 27.5–35.5)
AST SERPL-CCNC: 25 U/L — SIGNIFICANT CHANGE UP (ref 10–40)
BILIRUB SERPL-MCNC: 0.4 MG/DL — SIGNIFICANT CHANGE UP (ref 0.2–1.2)
BUN SERPL-MCNC: 14 MG/DL — SIGNIFICANT CHANGE UP (ref 7–23)
CALCIUM SERPL-MCNC: 9.6 MG/DL — SIGNIFICANT CHANGE UP (ref 8.4–10.5)
CHLORIDE SERPL-SCNC: 102 MMOL/L — SIGNIFICANT CHANGE UP (ref 96–108)
CO2 SERPL-SCNC: 26 MMOL/L — SIGNIFICANT CHANGE UP (ref 22–31)
CREAT SERPL-MCNC: 0.72 MG/DL — SIGNIFICANT CHANGE UP (ref 0.5–1.3)
EGFR: 94 ML/MIN/1.73M2 — SIGNIFICANT CHANGE UP
GLUCOSE SERPL-MCNC: 103 MG/DL — HIGH (ref 70–99)
HCV AB S/CO SERPL IA: 0.11 S/CO — SIGNIFICANT CHANGE UP (ref 0–0.99)
HCV AB SERPL-IMP: SIGNIFICANT CHANGE UP
INR BLD: 4.66 RATIO — HIGH (ref 0.88–1.16)
POTASSIUM SERPL-MCNC: 3.4 MMOL/L — LOW (ref 3.5–5.3)
POTASSIUM SERPL-SCNC: 3.4 MMOL/L — LOW (ref 3.5–5.3)
PROT SERPL-MCNC: 7.3 G/DL — SIGNIFICANT CHANGE UP (ref 6–8.3)
PROTHROM AB SERPL-ACNC: 54.9 SEC — HIGH (ref 10.5–13.4)
SODIUM SERPL-SCNC: 140 MMOL/L — SIGNIFICANT CHANGE UP (ref 135–145)

## 2023-01-19 PROCEDURE — 72156 MRI NECK SPINE W/O & W/DYE: CPT | Mod: 26

## 2023-01-19 PROCEDURE — 72158 MRI LUMBAR SPINE W/O & W/DYE: CPT | Mod: 26

## 2023-01-19 PROCEDURE — 99233 SBSQ HOSP IP/OBS HIGH 50: CPT

## 2023-01-19 RX ORDER — POTASSIUM CHLORIDE 20 MEQ
20 PACKET (EA) ORAL ONCE
Refills: 0 | Status: COMPLETED | OUTPATIENT
Start: 2023-01-19 | End: 2023-01-19

## 2023-01-19 RX ADMIN — Medication 600 MILLIGRAM(S): at 14:03

## 2023-01-19 RX ADMIN — LIDOCAINE 1 PATCH: 4 CREAM TOPICAL at 11:12

## 2023-01-19 RX ADMIN — LIDOCAINE 1 PATCH: 4 CREAM TOPICAL at 03:05

## 2023-01-19 RX ADMIN — Medication 650 MILLIGRAM(S): at 07:49

## 2023-01-19 RX ADMIN — Medication 600 MILLIGRAM(S): at 13:32

## 2023-01-19 RX ADMIN — Medication 20 MILLIGRAM(S): at 05:20

## 2023-01-19 RX ADMIN — TIOTROPIUM BROMIDE 2 PUFF(S): 18 CAPSULE ORAL; RESPIRATORY (INHALATION) at 11:16

## 2023-01-19 RX ADMIN — LIDOCAINE 1 PATCH: 4 CREAM TOPICAL at 23:13

## 2023-01-19 RX ADMIN — BUDESONIDE AND FORMOTEROL FUMARATE DIHYDRATE 2 PUFF(S): 160; 4.5 AEROSOL RESPIRATORY (INHALATION) at 18:46

## 2023-01-19 RX ADMIN — Medication 20 MILLIEQUIVALENT(S): at 18:46

## 2023-01-19 RX ADMIN — Medication 25 MILLIGRAM(S): at 05:20

## 2023-01-19 RX ADMIN — Medication 650 MILLIGRAM(S): at 08:25

## 2023-01-19 RX ADMIN — BUDESONIDE AND FORMOTEROL FUMARATE DIHYDRATE 2 PUFF(S): 160; 4.5 AEROSOL RESPIRATORY (INHALATION) at 05:21

## 2023-01-19 RX ADMIN — LIDOCAINE 1 PATCH: 4 CREAM TOPICAL at 19:04

## 2023-01-19 NOTE — PHYSICAL THERAPY INITIAL EVALUATION ADULT - NSPTDISCHREC_GEN_A_CORE
No
If pt d/c home would require home PT, assist with ALL mobility, & DME: RW, 3:1 commode, & w/c./[facility] Rehab

## 2023-01-19 NOTE — PHYSICAL THERAPY INITIAL EVALUATION ADULT - GENERAL OBSERVATIONS, REHAB EVAL
Chart reviewed events to date noted. Blood glucose reviewed. Pt tolerated 45min PT initial evaluation fair. Pt received supine in bed, c/o 10/10 pain, RN Darnell peña. Pt modAx1 with bed mobility and modAx1 ambulating w/ RW. Chart reviewed events to date noted. Blood glucose reviewed. Pt tolerated 45min PT initial evaluation fair. Pt received supine in bed, c/o 10/10 pain, RN Darnell peña. Pt modAx1 supine to sit EOB and modAx1 ambulating w/ RW. Pt ambulated 5ft x2, unable to continue due to high pain levels. Pt left in chair. Chart reviewed events to date noted. Blood glucose reviewed. Pt tolerated 45min PT initial evaluation fair. Pt received supine in bed, c/o 10/10 pain, RN Darnell peña.

## 2023-01-19 NOTE — PROGRESS NOTE ADULT - TIME BILLING
Time spent reviewing the chart documentation, reviewing labs and imaging studies, evaluating the patient, discussing the plan of care with the consultants & medical team, and documenting.

## 2023-01-19 NOTE — PHYSICAL THERAPY INITIAL EVALUATION ADULT - PERTINENT HX OF CURRENT PROBLEM, REHAB EVAL
62yoF hx afib on coumadin, asthma, HTN, who presents w/ CC of back pain. Patient started having symptoms last Saturday (1/14). No obvious trigger or injury. The pain is mostly in the lower back (midline) and occasionally radiates into the b/l thighs. Never had pain like this before. Tried using Bengay cream, which did not help. Pain is worse with walking or rolling over in bed. She is having difficulty walking. Denies numbness, tingling, or focal weakness. No bowel or bladder changes. No headaches, slurred speech, or vision changes. No recent medication changes. Her daily medications include metoprolol, warfarin, albuterol, Ellipta, lasix. No history of surgeries. Family history positive for brain aneurysm in sister and gastric ca in parent. ROS negative for cough, dysuria, diarrhea.  She works as a  and is on her feet a lot. 62yoF hx afib on coumadin, asthma, HTN, who presents w/ CC of back pain. Patient started having symptoms last Saturday (1/14). No obvious trigger or injury. The pain is mostly in the lower back (midline) and occasionally radiates into the b/l thighs. Never had pain like this before. Tried using Bengay cream, which did not help. Pain is worse with walking or rolling over in bed. She is having difficulty walking. Denies numbness, tingling, or focal weakness. No bowel or bladder changes. No headaches, slurred speech, or vision changes. No recent medication changes. Her daily medications include metoprolol, warfarin, albuterol, Ellipta, lasix.She works as a  and is on her feet a lot.    CT Thoracic spine: No acute fractures or dislocations are seen. Degenerative changes as described above  CT Lumbar spine: No acute fractures or dislocations are seen.  CT Abdomen and Pelvis: No acute aortic syndrome.Trace right pleural effusion. Dilatation of the distal common bile duct at 1 cm, the proximal and mid   CBD are not well visualized, further evaluation with MRCP may be obtained to assess for choledocholithiasis.. Increasing right supraclavicular lymph node measures 1.4 cm, nonspecific.

## 2023-01-19 NOTE — PROGRESS NOTE ADULT - PROBLEM SELECTOR PLAN 3
c/w rate control with metoprolol   Supratherapeutic INR on coumadin, hold tonight, no s/s of bleeding, will need to dose daily  Based on outpatient records - cards were considering ablation due to some LV dsfxn

## 2023-01-19 NOTE — PATIENT PROFILE ADULT - FALL HARM RISK - HARM RISK INTERVENTIONS

## 2023-01-19 NOTE — PHYSICAL THERAPY INITIAL EVALUATION ADULT - PLANNED THERAPY INTERVENTIONS, PT EVAL
balance training/gait training/strengthening stair training, GOAL: In 2 weeks pt will negotiate 1 flight of stairs independently with least restrictive device./balance training/bed mobility training/gait training/strengthening/transfer training

## 2023-01-19 NOTE — PROGRESS NOTE ADULT - ASSESSMENT
Assessment: 63yo woman with a PMHx significant for asthma, afib (on coumadin), HTN, who presents w/ CC of back pain. Patient started having symptoms last Saturday (1/14). No obvious trigger or injury. She works as a  and is on her feet a lot. The pain is mostly in the lower back (midline) and occasionally radiates into the b/l thighs. Never had pain like this before.  Pain is worse with walking or rolling over in bed. She is having difficulty walking. Denies numbness, tingling, or focal weakness. No bowel or bladder changes. No headaches, slurred speech, or vision changes. No recent medication changes.    CTH/A unremarkable. CT thoracic/lumbar spine showing degenerative changes, no fractures.     Impression: Acute lower back pain worsens with ambulation and supine position, without saddle paresthesia or incontinence. Possible etiologies includes radiculopathy vs myelopathy vs musculoskeletal vs compressive vs toxic/metabolic vs neoplastic (given right breast nodules) as etiology.    Recommendations:    [] MR cervical/lumbar spine w/w/o contrast  [x] continue w/daily lidocaine patch  [x] Ibuprofen 600-800mg PO TID PRN for pain  [x] Cyclobenzaprine 5-10mg PO TID PRN for spasms  [] Check vitamin B1, B6, B12, folate, D, E  [] PT/OT evaluation  [] Fall precautions    Plans discussed with neurology attending, Dr Dai

## 2023-01-19 NOTE — PROGRESS NOTE ADULT - ASSESSMENT
62F hx mild LV dyfxn, mild pulm HTN, chronic A.fib on coumadin (due to cost), HTN, asthma presents with acute lower back pain with ?weakness also found with 2 R breast nodules, and enlarged R supraclavicular LN

## 2023-01-20 ENCOUNTER — TRANSCRIPTION ENCOUNTER (OUTPATIENT)
Age: 63
End: 2023-01-20

## 2023-01-20 ENCOUNTER — NON-APPOINTMENT (OUTPATIENT)
Age: 63
End: 2023-01-20

## 2023-01-20 DIAGNOSIS — Z29.9 ENCOUNTER FOR PROPHYLACTIC MEASURES, UNSPECIFIED: ICD-10-CM

## 2023-01-20 LAB
ANION GAP SERPL CALC-SCNC: 13 MMOL/L — SIGNIFICANT CHANGE UP (ref 5–17)
ANION GAP SERPL CALC-SCNC: 13 MMOL/L — SIGNIFICANT CHANGE UP (ref 5–17)
APTT BLD: 43.8 SEC — HIGH (ref 27.5–35.5)
BUN SERPL-MCNC: 12 MG/DL — SIGNIFICANT CHANGE UP (ref 7–23)
BUN SERPL-MCNC: 14 MG/DL — SIGNIFICANT CHANGE UP (ref 7–23)
CALCIUM SERPL-MCNC: 9.5 MG/DL — SIGNIFICANT CHANGE UP (ref 8.4–10.5)
CALCIUM SERPL-MCNC: 9.5 MG/DL — SIGNIFICANT CHANGE UP (ref 8.4–10.5)
CHLORIDE SERPL-SCNC: 102 MMOL/L — SIGNIFICANT CHANGE UP (ref 96–108)
CHLORIDE SERPL-SCNC: 102 MMOL/L — SIGNIFICANT CHANGE UP (ref 96–108)
CO2 SERPL-SCNC: 24 MMOL/L — SIGNIFICANT CHANGE UP (ref 22–31)
CO2 SERPL-SCNC: 25 MMOL/L — SIGNIFICANT CHANGE UP (ref 22–31)
CREAT SERPL-MCNC: 0.73 MG/DL — SIGNIFICANT CHANGE UP (ref 0.5–1.3)
CREAT SERPL-MCNC: 0.74 MG/DL — SIGNIFICANT CHANGE UP (ref 0.5–1.3)
EGFR: 91 ML/MIN/1.73M2 — SIGNIFICANT CHANGE UP
EGFR: 93 ML/MIN/1.73M2 — SIGNIFICANT CHANGE UP
GLUCOSE SERPL-MCNC: 100 MG/DL — HIGH (ref 70–99)
GLUCOSE SERPL-MCNC: 97 MG/DL — SIGNIFICANT CHANGE UP (ref 70–99)
HCT VFR BLD CALC: 36.6 % — SIGNIFICANT CHANGE UP (ref 34.5–45)
HGB BLD-MCNC: 12.2 G/DL — SIGNIFICANT CHANGE UP (ref 11.5–15.5)
INR BLD: 4 RATIO — HIGH (ref 0.88–1.16)
MCHC RBC-ENTMCNC: 27.7 PG — SIGNIFICANT CHANGE UP (ref 27–34)
MCHC RBC-ENTMCNC: 33.3 GM/DL — SIGNIFICANT CHANGE UP (ref 32–36)
MCV RBC AUTO: 83 FL — SIGNIFICANT CHANGE UP (ref 80–100)
NRBC # BLD: 0 /100 WBCS — SIGNIFICANT CHANGE UP (ref 0–0)
PLATELET # BLD AUTO: 283 K/UL — SIGNIFICANT CHANGE UP (ref 150–400)
POTASSIUM SERPL-MCNC: 4 MMOL/L — SIGNIFICANT CHANGE UP (ref 3.5–5.3)
POTASSIUM SERPL-MCNC: 4.5 MMOL/L — SIGNIFICANT CHANGE UP (ref 3.5–5.3)
POTASSIUM SERPL-SCNC: 4 MMOL/L — SIGNIFICANT CHANGE UP (ref 3.5–5.3)
POTASSIUM SERPL-SCNC: 4.5 MMOL/L — SIGNIFICANT CHANGE UP (ref 3.5–5.3)
PROTHROM AB SERPL-ACNC: 47.1 SEC — HIGH (ref 10.5–13.4)
RBC # BLD: 4.41 M/UL — SIGNIFICANT CHANGE UP (ref 3.8–5.2)
RBC # FLD: 13.6 % — SIGNIFICANT CHANGE UP (ref 10.3–14.5)
SODIUM SERPL-SCNC: 139 MMOL/L — SIGNIFICANT CHANGE UP (ref 135–145)
SODIUM SERPL-SCNC: 140 MMOL/L — SIGNIFICANT CHANGE UP (ref 135–145)
WBC # BLD: 9.03 K/UL — SIGNIFICANT CHANGE UP (ref 3.8–10.5)
WBC # FLD AUTO: 9.03 K/UL — SIGNIFICANT CHANGE UP (ref 3.8–10.5)

## 2023-01-20 PROCEDURE — 99233 SBSQ HOSP IP/OBS HIGH 50: CPT

## 2023-01-20 RX ADMIN — TIOTROPIUM BROMIDE 2 PUFF(S): 18 CAPSULE ORAL; RESPIRATORY (INHALATION) at 12:48

## 2023-01-20 RX ADMIN — LIDOCAINE 1 PATCH: 4 CREAM TOPICAL at 19:07

## 2023-01-20 RX ADMIN — BUDESONIDE AND FORMOTEROL FUMARATE DIHYDRATE 2 PUFF(S): 160; 4.5 AEROSOL RESPIRATORY (INHALATION) at 05:11

## 2023-01-20 RX ADMIN — BUDESONIDE AND FORMOTEROL FUMARATE DIHYDRATE 2 PUFF(S): 160; 4.5 AEROSOL RESPIRATORY (INHALATION) at 16:04

## 2023-01-20 RX ADMIN — Medication 25 MILLIGRAM(S): at 05:11

## 2023-01-20 RX ADMIN — Medication 20 MILLIGRAM(S): at 05:11

## 2023-01-20 RX ADMIN — LIDOCAINE 1 PATCH: 4 CREAM TOPICAL at 12:49

## 2023-01-20 RX ADMIN — Medication 650 MILLIGRAM(S): at 21:22

## 2023-01-20 RX ADMIN — Medication 600 MILLIGRAM(S): at 05:11

## 2023-01-20 NOTE — DISCHARGE NOTE PROVIDER - NSDCFUSCHEDAPPT_GEN_ALL_CORE_FT
[facility]  INTMED [address]  Scheduled Appointment: 01/24/2023    Destin Mejia  [facility]  CARDIOLOGY [address]   Scheduled Appointment: 01/25/2023    Viv Peña  [facility]  DERM [address]  Scheduled Appointment: 02/15/2023    [facility]  PULED [address]  Scheduled Appointment: 02/21/2023    [facility]eth Curtis  [facility]  [facility] [address]  Scheduled Appointment: 02/21/2023    Chelsy Aguero  [facility]  [facility] [place] [address]  Scheduled Appointment: 02/21/2023

## 2023-01-20 NOTE — DISCHARGE NOTE PROVIDER - PROVIDER TOKENS
FREE:[LAST:[Dr. Vesna Flor],PHONE:[phone]],FAX:[(   )    -],ADDRESS:[[address] [place] [address]],FOLLOWUP:[1 week]],PROVIDER:[TOKEN:[[id number]],FOLLOWUP:[1 week]] PROVIDER:[TOKEN:[[id number]],FOLLOWUP:[1 week]],FREE:[LAST:[Dr. Vesna Flor],PHONE:[phone]],FAX:[(   )    -],ADDRESS:[[address] [place] [address]],FOLLOWUP:[1-3 days]]

## 2023-01-20 NOTE — DISCHARGE NOTE PROVIDER - NSDCFUADDAPPT_GEN_ALL_CORE_FT
APPTS ARE READY TO BE MADE: [X] YES    Best Family or Patient Contact (if needed):    Additional Information about above appointments (if needed):    1:   2:   3:     Other comments or requests:    APPTS ARE READY TO BE MADE: [X] YES    Best Family or Patient Contact (if needed):    Additional Information about above appointments (if needed):    1: Please follow up with Dr. Flor in 1 day for PT/INR check  2:   3:     Other comments or requests:    APPTS ARE READY TO BE MADE: [X] YES    Best Family or Patient Contact (if needed):    Additional Information about above appointments (if needed):    1: Please follow up with Dr. Aguero in 1 day for PT/INR check  2:   3:     Other comments or requests:    APPTS ARE READY TO BE MADE: [X] YES    Best Family or Patient Contact (if needed):    Additional Information about above appointments (if needed):    1: Please follow up with Dr. Aguero in 1 day for PT/INR check  2:   3:     Other comments or requests:   Patient was previously scheduled with Dr. Aguero on 1/24 at 3PM at [address] [place] [address]. Patient was provided with Dr. Flor's information and was advised to call to schedule follow up within specified time frame. At this time patient declined scheduling assistance.

## 2023-01-20 NOTE — DISCHARGE NOTE PROVIDER - CARE PROVIDER_API CALL
Dr. Vesna Flor,   [address]  Phone: [phone]  Fax: (   )    -  Follow Up Time: 1 week    Chelsy Aguero)  Internal Medicine  [address] [place] [address]  Phone: [phone]  Fax: [phone]  Follow Up Time: 1 week   Chelsy Aguero)  Internal Medicine  [address] [facility], [place] [address]  Phone: [phone]  Fax: [phone]  Follow Up Time: 1 week    Dr. Vesna Flor,   [address]  Phone: [phone]  Fax: (   )    -  Follow Up Time: 1-3 days

## 2023-01-20 NOTE — DISCHARGE NOTE PROVIDER - NSDCCPCAREPLAN_GEN_ALL_CORE_FT
PRINCIPAL DISCHARGE DIAGNOSIS  Diagnosis: Back pain  Assessment and Plan of Treatment: MRI of C/L spine demonstrated no evidence for metastatic disease. Mild degenerative changes. Pan CT negative for fracture, cord compression  Per neuro team 1/20: patient to follow up outpatient orthopedic surgery and neurologist outpatient for EMG study with Dr Vesna Flor. Continue medication as prescribed.      SECONDARY DISCHARGE DIAGNOSES  Diagnosis: Chronic atrial fibrillation  Assessment and Plan of Treatment: Lab work notable for supra-therapeutic INR thus coumadin held while inpatient based on daily INR levels.   This medication requires PT/INR Blood work monitoring, Please follow up with PCP for INR/PT level  Tell your Dentist, Surgeon, and other Doctors that you are on this drug.  Atrial fibrillation is a common heart rhythm problem which increases the risk of stroke and heat attack.  It helps if you control your blood pressure, avoid alcohol, cut down on caffeine, get treatment for your thyroid if it is overactive, and perform moderate exercise in consultation with your Primary Care Provider.  Call your doctor if you experience chest tightness/pain, lightheadedness, loss of consciousness, shortness of breath (especially with exercise), feel your heart racing or beating unusually, frequent or abnormal bleeding.  It is important to take all your heart medications as prescribed.      Diagnosis: Breast nodule  Assessment and Plan of Treatment: Please follow up outpatient for breast MRI and biopsy of supraclavicular Lymph node     PRINCIPAL DISCHARGE DIAGNOSIS  Diagnosis: Back pain  Assessment and Plan of Treatment: MRI of C/L spine demonstrated no evidence for metastatic disease. Mild degenerative changes. Pan CT negative for fracture, cord compression  Per neuro team 1/20: patient to follow up outpatient for EMG study with Dr Vesna Flor. Continue medication as prescribed.      SECONDARY DISCHARGE DIAGNOSES  Diagnosis: Chronic atrial fibrillation  Assessment and Plan of Treatment: Lab work notable for supra-therapeutic INR thus coumadin held while inpatient based on daily INR levels.   On day of discharge INR Level 2.89 - dosed 2.5mg of coumadin  Please follow up with PCP for INR/PT level on Monday 1/23/23  Tell your Dentist, Surgeon, and other Doctors that you are on this drug.  Atrial fibrillation is a common heart rhythm problem which increases the risk of stroke and heat attack.  It helps if you control your blood pressure, avoid alcohol, cut down on caffeine, get treatment for your thyroid if it is overactive, and perform moderate exercise in consultation with your Primary Care Provider.  Call your doctor if you experience chest tightness/pain, lightheadedness, loss of consciousness, shortness of breath (especially with exercise), feel your heart racing or beating unusually, frequent or abnormal bleeding.  It is important to take all your heart medications as prescribed.      Diagnosis: Breast nodule  Assessment and Plan of Treatment: Please follow up outpatient for breast MRI and biopsy of supraclavicular Lymph node     PRINCIPAL DISCHARGE DIAGNOSIS  Diagnosis: Back pain  Assessment and Plan of Treatment: MRI of C/L spine demonstrated no evidence for metastatic disease. Mild degenerative changes. Pan CT negative for fracture, cord compression  Per neuro team 1/20: patient to follow up outpatient for EMG study with Dr Vesna Flor. Continue medication as prescribed.      SECONDARY DISCHARGE DIAGNOSES  Diagnosis: Breast nodule  Assessment and Plan of Treatment: Please follow up outpatient for breast MRI and biopsy of supraclavicular Lymph node    Diagnosis: Chronic atrial fibrillation  Assessment and Plan of Treatment: Lab work notable for supra-therapeutic INR thus coumadin held while inpatient based on daily INR levels.   On day of discharge INR Level 2.89 - dosed 2.5mg of coumadin.  Please follow up with PCP for INR/PT level on Monday 1/23/23 and further coumadin dose adjustment.   Tell your Dentist, Surgeon, and other Doctors that you are on this drug.  Atrial fibrillation is a common heart rhythm problem which increases the risk of stroke and heat attack.  It helps if you control your blood pressure, avoid alcohol, cut down on caffeine, get treatment for your thyroid if it is overactive, and perform moderate exercise in consultation with your Primary Care Provider.  Call your doctor if you experience chest tightness/pain, lightheadedness, loss of consciousness, shortness of breath (especially with exercise), feel your heart racing or beating unusually, frequent or abnormal bleeding.  It is important to take all your heart medications as prescribed.

## 2023-01-20 NOTE — DISCHARGE NOTE PROVIDER - NSDCMRMEDTOKEN_GEN_ALL_CORE_FT
albuterol 0.63 mg/3 mL (0.021%) inhalation solution: 3 milliliter(s) inhaled 3 times a day  Lasix 20 mg oral tablet: 1 tab(s) orally once a day  metoprolol succinate 25 mg oral capsule, extended release: 1 cap(s) orally once a day   Trelegy Ellipta 100 mcg-62.5 mcg-25 mcg/inh inhalation powder: 1 puff(s) inhaled once a day  warfarin 5 mg oral tablet: 1 tab(s) orally once a day (at bedtime)    1 rolling walker : Dx - back pain    ICD - M54.50  acetaminophen 325 mg oral tablet: 2 tab(s) orally every 6 hours, As needed, Temp greater or equal to 38C (100.4F), Mild Pain (1 - 3)  albuterol 0.63 mg/3 mL (0.021%) inhalation solution: 3 milliliter(s) inhaled 3 times a day, As Needed - for shortness of breath and/or wheezing  cyclobenzaprine 5 mg oral tablet: 1 tab(s) orally 3 times a day, As needed, Muscle Spasm  Lasix 20 mg oral tablet: 1 tab(s) orally once a day  lidocaine 4% topical film: Apply topically to affected area once a day   Patch may remain in place for 12 hours in any 24-hour period  metoprolol succinate 25 mg oral capsule, extended release: 1 cap(s) orally once a day   Outpatient Physical Therapy:   petrolatum topical ointment: 1 application topically once a day  Protonix 40 mg oral delayed release tablet: 1 tab(s) orally once a day   Trelegy Ellipta 100 mcg-62.5 mcg-25 mcg/inh inhalation powder: 1 puff(s) inhaled once a day  triamcinolone 0.1% topical ointment: 1 application topically 2 times a day to abdomen  warfarin 2.5 mg oral tablet: 1 tab(s) orally once (at bedtime)   Please follow up with your PCP for PT/INR check in 1 day

## 2023-01-20 NOTE — PROGRESS NOTE ADULT - ASSESSMENT
Assessment: 63yo woman with a PMHx significant for asthma, afib (on coumadin), HTN, who presents w/ CC of back pain. Patient started having symptoms last Saturday (1/14). No obvious trigger or injury. She works as a  and is on her feet a lot. The pain is mostly in the lower back (midline) and occasionally radiates into the b/l thighs. Never had pain like this before.  Pain is worse with walking or rolling over in bed. She is having difficulty walking. Denies numbness, tingling, or focal weakness. No bowel or bladder changes. No headaches, slurred speech, or vision changes. No recent medication changes.    CTH/A unremarkable. CT thoracic/lumbar spine showing degenerative changes, no fractures.     Impression: Acute lower back pain worsens with ambulation and supine position, without saddle paresthesia or incontinence. Possible etiologies most likely radiculopathy, less likely myelopathy or musculoskeletal or compressive vs neoplastic given no evidence of cord compression, abnormal enhancement or metastatic changes on MRI C/L spine.  Degenerative changes at C and L spine.    Recommendations:    [] MR cervical/lumbar spine w/w/o contrast result above, no evidence of cord compression, abnormal enhancement or metastatic changes. Degenerative changes at C and L spine.  [] consider orthopedics consult for above MRI C/L spine results.  [] continue w/ lidocaine patch as needed  [] Cyclobenzaprine 5-10mg PO TID short course PRN for spasms,   [] Check vitamin B1, B6, B12, folate, D, E  [] PT/OT evaluation  [] Patient can follow up with Dr. Vesna Flor for EMG study at [address] [facility] after discharge by calling [phone] to schedule this appointment  [] No further inpatient neurology recommendation, will sign off, please contact [phone] with any questions.      Plans discussed with neurology attending, Dr Dai Assessment: 61yo woman with a PMHx significant for asthma, afib (on coumadin), HTN, who presents w/ CC of back pain. Patient started having symptoms last Saturday (1/14). No obvious trigger or injury. She works as a  and is on her feet a lot. The pain is mostly in the lower back (midline) and occasionally radiates into the b/l thighs. Never had pain like this before.  Pain is worse with walking or rolling over in bed. She is having difficulty walking. Denies numbness, tingling, or focal weakness. No bowel or bladder changes. No headaches, slurred speech, or vision changes. No recent medication changes.    CTH/A unremarkable. CT thoracic/lumbar spine showing degenerative changes, no fractures.     Impression: Acute lower back pain worsens with ambulation and supine position, without saddle paresthesia or incontinence. Possible etiologies most likely radiculopathy, less likely myelopathy or musculoskeletal or compressive vs neoplastic given no evidence of cord compression, abnormal enhancement or metastatic changes on MRI C/L spine.  Degenerative changes at C and L spine.    Recommendations:    [] MR cervical/lumbar spine w/w/o contrast result above, no evidence of cord compression, abnormal enhancement or metastatic changes. Degenerative changes at C and L spine.  [] consider spine consult for above MRI C/L spine results; can be done as outpatient and if symptoms not resolved  [] continue w/ lidocaine patch as needed  [] Cyclobenzaprine 5-10mg PO TID short course (1-2 weeks) PRN for spasms, ibuprofen 600-800mg PO TID prn for 1-2 weeks for anti-inflammatory effects as well  [] Check vitamin B1, B6, B12, folate, D, E; can follow-up results as outpatient  [] PT/OT evaluation  [] Patient can follow up with Dr. Vesna Flor for EMG study at [address] after discharge by calling [phone] to schedule this appointment  [] No further inpatient neurology recommendation, will sign off, please contact [phone] with any questions.      Plans discussed with neurology attending, Dr Dai

## 2023-01-20 NOTE — DISCHARGE NOTE PROVIDER - HOSPITAL COURSE
62 year old female with PMhx of mild LV dyfxn, mild pulm HTN, chronic A.fib on coumadin (due to cost), HTN, asthma presents with acute lower back pain with ?weakness also found with 2 R breast nodules, and enlarged R supraclavicular LN. Patient was admitted for further medical management. MRI of C/L spine demonstrated no evidence for metastatic disease. Mild degenerative changes. Pan CT negative for fracture, cord compression  Per neuro team 1/20: patient to follow up outpatient orthopedic surgery and neurologist outpatient for EMG study with Dr Vesna Flor. Continue Pain management with motrin 600mg TID and lidoderm patch, tylenol, flexeril PRN  -Neuro checks q4hrs. Of note, lab work notable for supra-therapeutic INR thus coumadin held while inpatient based on daily INR levels.     Patient also with mildly enlarged right supraclavicular lymph node measures 1.4 x 1.2 cm (10:28), nonspecific yet increased in size compared to prior exam. Right superior medial breast ill-defined attenuation measures approximately 1.4 cm(10:135), in region of previously described 2.3 cm nodule. Similar right lateral breast nodule measures 0.8 cm. Patient says she has not had a mammography, findings might be consistent with a malignancy. Additionally, on last outpatient visit patient was referred for EGD/colonoscopy (family history of gastric ca) due to weight loss. If Breast MRI can't be done inpatient, then patient will need to follow up outpatient for breast MRI, biopsy of supraclavicular LN.       62 year old female with PMhx of mild LV dyfxn, mild pulm HTN, chronic A.fib on coumadin (due to cost), HTN, asthma presents with acute lower back pain with ?weakness also found with 2 R breast nodules, and enlarged R supraclavicular LN. Patient was admitted for further medical management. MRI of C/L spine demonstrated no evidence for metastatic disease. Mild degenerative changes. Pan CT negative for fracture, cord compression    Per neuro team 1/20: patient to follow up outpatient with neurologist for outpatient for EMG study with Dr Vesna Flor. Continue Pain management with motrin 600mg TID and lidoderm patch, tylenol, flexeril PRN. Of note, lab work notable for supra-therapeutic INR thus coumadin held while inpatient based on daily INR levels. On day of discharge, INR noted to be 2.89. Coumadin dosed to 2.5mg and patient advised to follow up with PCP on Monday 1/23/23 for PT/INR check.     Patient also with mildly enlarged right supraclavicular lymph node measures 1.4 x 1.2 cm (10:28), nonspecific yet increased in size compared to prior exam. Right superior medial breast ill-defined attenuation measures approximately 1.4 cm(10:135), in region of previously described 2.3 cm nodule. Similar right lateral breast nodule measures 0.8 cm. Patient says she has not had a mammography, findings might be consistent with a malignancy. Additionally, on last outpatient visit patient was referred for EGD/colonoscopy (family history of gastric ca) due to weight loss. Given breast MRI unable to obtain inpatient, patient will need to follow up outpatient for breast MRI, biopsy of supraclavicular LN. Physical therapy evaluated patient recommending [facility], however patient wants to go home;     Discharge/Dispo/Med rec discussed with attending Dr. Fernandez. Patient medically cleared for discharge home with outpatient PT with outpatient follow up with PCP/neurology.       62 year old female with PMhx of mild LV dyfxn, mild pulm HTN, chronic A.fib on coumadin (due to cost), HTN, asthma presents with acute lower back pain with ?weakness also found with 2 R breast nodules, and enlarged R supraclavicular LN. Patient was admitted for further medical management. MRI of C/L spine demonstrated no evidence for metastatic disease. Mild degenerative changes. Pan CT negative for fracture, cord compression    Per neuro team 1/20: patient to follow up outpatient with neurologist for outpatient for EMG study with Dr Vesna Flor. Continue Pain management with motrin 600mg TID and lidoderm patch, tylenol, flexeril PRN.     Of note, lab work notable for supra-therapeutic INR thus coumadin held while inpatient based on daily INR levels. On day of discharge, INR noted to be 2.89. Coumadin dose changed to 2.5mg  po qd per discussion with clinical pharmacist and patient advised to follow up with PCP on Monday 1/23/23 for PT/INR check and further coumadin dose adjustment.     Patient also with mildly enlarged right supraclavicular lymph node measures 1.4 x 1.2 cm (10:28), nonspecific yet increased in size compared to prior exam. Right superior medial breast ill-defined attenuation measures approximately 1.4 cm(10:135), in region of previously described 2.3 cm nodule. Similar right lateral breast nodule measures 0.8 cm. Patient says she has not had a mammography, findings might be consistent with a malignancy. Additionally, on last outpatient visit patient was referred for EGD/colonoscopy (family history of gastric ca) due to weight loss. Given breast MRI unable to obtain inpatient, patient will need to follow up outpatient for breast MRI, biopsy of supraclavicular LN.     Physical therapy evaluated patient recommending [facility], however patient wants to go home;     Discharge/Dispo/Med rec discussed with attending Dr. Fernandez. Patient medically cleared for discharge home with outpatient PT with outpatient follow up with PCP/neurology.

## 2023-01-20 NOTE — PROGRESS NOTE ADULT - PROBLEM SELECTOR PLAN 3
Continue rate control with metoprolol succinate 25    #Supratherapeutic INR on coumadin  Based on outpatient records - cards were considering ablation due to some LV dsfxn  HOLD coumadin  Monitor for s/s of bleeding  Check INR daily and daily assessment for coumadin dosing

## 2023-01-20 NOTE — DISCHARGE NOTE PROVIDER - CARE PROVIDERS DIRECT ADDRESSES
,DirectAddress_Unknown,[email or web address],DirectAddress_Unknown

## 2023-01-20 NOTE — DISCHARGE NOTE PROVIDER - ATTENDING DISCHARGE PHYSICAL EXAMINATION:
CONSTITUTIONAL: NAD, well-groomed  EYES:  EOMI, conjunctiva and sclera clear  ENMT: Moist oral mucosa  NECK: Supple, no JVD  RESPIRATORY: Normal respiratory effort; lungs are clear to auscultation bilaterally  CARDIOVASCULAR: Regular rate and rhythm, normal S1 and S2, no murmur/rub/gallop; No lower extremity edema  ABDOMEN: normoactive bowel sounds, soft, nontender to palpation, no distension   MUSCULOSKELETAL:  extremities non tender. moving all extremities   PSYCH: A+O x3; affect appropriate, calm and cooperative  NEUROLOGY: CN 2-12 are intact and symmetric; no gross sensory deficits   SKIN: scattered mildly raised palpable wheals on neck, few on abdominal area

## 2023-01-20 NOTE — PROGRESS NOTE ADULT - PROBLEM SELECTOR PLAN 8
DVT PPX: Ambulate as tolerated  Fall precautions      Discharge planning  PT rec [facility], F/U CM DVT PPX: Ambulate as tolerated  Fall precautions      Discharge planning  PT rec [facility], however patient wants to go home; F/U CM

## 2023-01-21 LAB
24R-OH-CALCIDIOL SERPL-MCNC: 14 NG/ML — LOW (ref 30–80)
APTT BLD: 40.4 SEC — HIGH (ref 27.5–35.5)
FOLATE SERPL-MCNC: 17.7 NG/ML — SIGNIFICANT CHANGE UP
INR BLD: 3.24 RATIO — HIGH (ref 0.88–1.16)
PROTHROM AB SERPL-ACNC: 38 SEC — HIGH (ref 10.5–13.4)
VIT B12 SERPL-MCNC: 724 PG/ML — SIGNIFICANT CHANGE UP (ref 232–1245)

## 2023-01-21 PROCEDURE — 93010 ELECTROCARDIOGRAM REPORT: CPT

## 2023-01-21 PROCEDURE — 99232 SBSQ HOSP IP/OBS MODERATE 35: CPT

## 2023-01-21 RX ORDER — PANTOPRAZOLE SODIUM 20 MG/1
40 TABLET, DELAYED RELEASE ORAL DAILY
Refills: 0 | Status: DISCONTINUED | OUTPATIENT
Start: 2023-01-21 | End: 2023-01-22

## 2023-01-21 RX ORDER — PETROLATUM,WHITE
1 JELLY (GRAM) TOPICAL DAILY
Refills: 0 | Status: DISCONTINUED | OUTPATIENT
Start: 2023-01-21 | End: 2023-01-22

## 2023-01-21 RX ADMIN — PANTOPRAZOLE SODIUM 40 MILLIGRAM(S): 20 TABLET, DELAYED RELEASE ORAL at 16:15

## 2023-01-21 RX ADMIN — LIDOCAINE 1 PATCH: 4 CREAM TOPICAL at 12:20

## 2023-01-21 RX ADMIN — BUDESONIDE AND FORMOTEROL FUMARATE DIHYDRATE 2 PUFF(S): 160; 4.5 AEROSOL RESPIRATORY (INHALATION) at 05:38

## 2023-01-21 RX ADMIN — BUDESONIDE AND FORMOTEROL FUMARATE DIHYDRATE 2 PUFF(S): 160; 4.5 AEROSOL RESPIRATORY (INHALATION) at 19:07

## 2023-01-21 RX ADMIN — Medication 1 APPLICATION(S): at 17:58

## 2023-01-21 RX ADMIN — Medication 650 MILLIGRAM(S): at 00:16

## 2023-01-21 RX ADMIN — Medication 20 MILLIGRAM(S): at 05:37

## 2023-01-21 RX ADMIN — TIOTROPIUM BROMIDE 2 PUFF(S): 18 CAPSULE ORAL; RESPIRATORY (INHALATION) at 12:21

## 2023-01-21 RX ADMIN — LIDOCAINE 1 PATCH: 4 CREAM TOPICAL at 18:40

## 2023-01-21 RX ADMIN — Medication 25 MILLIGRAM(S): at 05:37

## 2023-01-21 RX ADMIN — Medication 1 APPLICATION(S): at 17:54

## 2023-01-21 RX ADMIN — LIDOCAINE 1 PATCH: 4 CREAM TOPICAL at 00:15

## 2023-01-21 NOTE — PROGRESS NOTE ADULT - PROBLEM SELECTOR PLAN 3
Continue rate control with metoprolol succinate 25    #Supratherapeutic INR on coumadin  -Based on outpatient records - cards were considering ablation due to some LV dsfxn  -HOLD coumadin as INR today 3.24 >    > Discussed with clinical pharmacist, would dose coumadin 2.5mg tomorrow if INR continues to trend in the right direction. Patient doesn't appear to have a safe followup plan for INR monitoring and coumadin warfarin, and given the weekend unavailability of outpatient facility, would monitor inpatient for another day. Discharge plan, med management and follow up need discussed with patient.   -Monitor for s/s of bleeding  -Check INR daily and daily assessment for coumadin dosing Continue rate control with metoprolol succinate 25    #Supratherapeutic INR on coumadin  -Based on outpatient records - cards were considering ablation due to some LV dsfxn  -HOLD coumadin as INR today 3.24 >    > Discussed with clinical pharmacist, would dose coumadin 2.5mg tomorrow if INR continues to trend in the right direction. Patient doesn't appear to have a safe followup plan for INR monitoring and coumadin warfarin, and given the weekend unavailability of outpatient facility, would monitor inpatient for another day. Discharge plan, med management and follow up need discussed with patient. Patient states she follows with Dr. Aguero [phone] for INR checks and coumadin management.   -Monitor for s/s of bleeding  -Check INR daily and daily assessment for coumadin dosing

## 2023-01-21 NOTE — PROGRESS NOTE ADULT - PROBLEM SELECTOR PLAN 4
mild LV dsfxn likely due to Chronic afib  Last TTE (recent) improved LV fxn to 55% (in 4/22 EF 45%)    Continue Lasix   Not currently on ACE/ARB (due to dizziness)
mild LV dsfxn likely due to Chronic afib  Last TTE (recent) improved LV fxn to 55% (in 4/22 EF 45%)    Continue Lasix   Not currently on ACE/ARB (due to dizziness)
mild LV dsfxn likely due to chronic afib  c/w lasix  Last TTE (recent) improved LV fxn to 55% (in 4/22 EF 45%)  Not currently on ACE/ARB (due to dizziness)

## 2023-01-21 NOTE — PROGRESS NOTE ADULT - PROBLEM SELECTOR PLAN 8
DVT PPX: Ambulate as tolerated  Fall precautions      Discharge planning  PT rec [facility], however patient wants to go home;   1/21:  PT recommendation changed to outpatient P/T & RW.  F/U CM

## 2023-01-21 NOTE — PROGRESS NOTE ADULT - SUBJECTIVE AND OBJECTIVE BOX
NEUROLOGY FOLLOW-UP CONSULT NOTE    RFC: lower back pain    Interval history: No acute neurologic events overnight.    Meds:  MEDICATIONS  (STANDING):  budesonide  80 MICROgram(s)/formoterol 4.5 MICROgram(s) Inhaler 2 Puff(s) Inhalation two times a day  furosemide    Tablet 20 milliGRAM(s) Oral daily  lidocaine   4% Patch 1 Patch Transdermal daily  metoprolol succinate ER 25 milliGRAM(s) Oral daily  tiotropium 2.5 MICROgram(s) Inhaler 2 Puff(s) Inhalation daily    MEDICATIONS  (PRN):  acetaminophen     Tablet .. 650 milliGRAM(s) Oral every 6 hours PRN Temp greater or equal to 38C (100.4F), Mild Pain (1 - 3)  albuterol    90 MICROgram(s) HFA Inhaler 1 Puff(s) Inhalation four times a day PRN Bronchospasm  cyclobenzaprine 5 milliGRAM(s) Oral three times a day PRN Muscle Spasm  ibuprofen  Tablet. 600 milliGRAM(s) Oral three times a day PRN Moderate Pain (4 - 6)        PMHx/PSHx/FHx/SHx:  Weakness    History of Sprained Right Hamstring Insertion    Abnormal coagulation profile    Allergic rhinitis, unspecified    Anorexia    Cardiac arrhythmia, unspecified    Chronic combined systolic (congestive) and diastolic (congestive) heart failure    Disorder of pigmentation, unspecified    Emphysema, unspecified    Encounter for follow-up examination after completed treatment for conditions other than malignant neoplasm    Encounter for general adult medical examination without abnormal findings    Encounter for immunization    Encounter for other screening for malignant neoplasm of breast    Encounter for prophylactic measures, unspecified    Encounter for screening for malignant neoplasm of cervix    Encounter for screening for malignant neoplasm of colon    Encounter for therapeutic drug level monitoring    Eosinophilic asthma    Essential (primary) hypertension    Heart failure, unspecified    Hypertensive heart disease with heart failure    Localized edema    Localized swelling, mass and lump, right lower limb    Long term (current) use of anticoagulants    Long term (current) use of non-steroidal anti-inflammatories (nsaid)    Nonrheumatic mitral (valve) insufficiency    Other chest pain    Other ill-defined heart diseases    Other melanin hyperpigmentation    Other specified abnormal immunological findings in serum    Pain in right leg    Personal history of other diseases of the circulatory system    Personal history of other specified conditions    Pulmonary hypertension, unspecified    Shortness of breath    Snoring    Unspecified asthma with status asthmaticus    Unspecified asthma, uncomplicated    Unspecified atrial fibrillation    Unspecified skin changes    No pertinent family history in first degree relatives    FH: gastric cancer (Father)    FH: HTN (hypertension) (Mother)    FH: aneurysm (Mother, Sibling)    Handoff    MEWS Score    No pertinent past medical history    COVID-19 vaccine series completed    HTN (hypertension)    Asthma    H/O aneurysm    Mild pulmonary hypertension    Chronic systolic congestive heart failure    Chronic atrial fibrillation    Weakness    Back pain    Breast nodule    Chronic atrial fibrillation    Asthma    Chronic systolic congestive heart failure    Mild pulmonary hypertension    White matter periventricular infarction    No significant past surgical history    BACK PAIN    17    [email or web address]        Allergies:  No Known Allergies      ROS: All systems negative except as documented in Interval history    O:  MEDICATIONS  (STANDING):  budesonide  80 MICROgram(s)/formoterol 4.5 MICROgram(s) Inhaler 2 Puff(s) Inhalation two times a day  furosemide    Tablet 20 milliGRAM(s) Oral daily  lidocaine   4% Patch 1 Patch Transdermal daily  metoprolol succinate ER 25 milliGRAM(s) Oral daily  tiotropium 2.5 MICROgram(s) Inhaler 2 Puff(s) Inhalation daily    MEDICATIONS  (PRN):  acetaminophen     Tablet .. 650 milliGRAM(s) Oral every 6 hours PRN Temp greater or equal to 38C (100.4F), Mild Pain (1 - 3)  albuterol    90 MICROgram(s) HFA Inhaler 1 Puff(s) Inhalation four times a day PRN Bronchospasm  cyclobenzaprine 5 milliGRAM(s) Oral three times a day PRN Muscle Spasm  ibuprofen  Tablet. 600 milliGRAM(s) Oral three times a day PRN Moderate Pain (4 - 6)    Focused neurologic exam:  MS - AAO x3, speech fluent, rep/naming intact, follows commands, attn/conc/recent and remote memory/fund of knowledge WNL  CN - PERRLA, EOMI, VFF, face sens/str/hearing WNL b/l, tongue/palate midline, trap 5/5 b/l  Motor - Normal bulk/tone, 5/5 all except left hip flexion 4+/5  Sens - LT/temp/vib intact all  DTR's - 2+ all and downgoing b/l plantar response  Coord - FtN intact b/l  Gait and station - wide gait with walker    Pertinent labs/studies:      LABS:  cret    01-20    139  |  102  |  12  ----------------------------<  97  4.5   |  24  |  0.73    Ca    9.5      20 Jan 2023 06:49    TPro  7.3  /  Alb  3.4  /  TBili  0.4  /  DBili  x   /  AST  25  /  ALT  19  /  AlkPhos  109  01-19    PT/INR - ( 20 Jan 2023 06:49 )   PT: 47.1 sec;   INR: 4.00 ratio         PTT - ( 20 Jan 2023 06:49 )  PTT:43.8 sec    Radiology:  MR Cervical and lumbar Spine w/wo IV Cont (01.19.23 @ 18:34)   FINDINGS:    MRI CERVICAL SPINE:    Vertebral body height, marrow signal homogeneity, and facet alignment are   maintained throughout the visualizedspinal segments.    Straightening of the normal cervical lordosis. Craniocervical junction is   unremarkable.    Disc space narrowing at C4-C5, C5-C6, and C6-C7. Enhancing Modic type I   endplate degenerative changes at C4-C5 and C5-C6.    No spinal cord compression or abnormal intrinsic cord signal.    No abnormal epidural, leptomeningeal, or intramedullary enhancement. No   lymphadenopathy.    C2-C3: No spinal canal stenosis or neural foraminal narrowing.    C3-C4: Small central disc protrusiondeforms the ventral sac. Ventral   cord flattening. No neural foraminal narrowing.    C4-C5: Broad-based disc protrusion deforms the ventral thecal sac. Mild   ventral cord flattening. Bilateral uncinate hypertrophy results in mild   to moderate right and mild left neural foraminal narrowing.    C5-C6: Broad-based disc protrusion deforms the ventral thecal sac. Mild   ventral cord flattening. No neural foraminal narrowing.    C6-C7: Broad-based disc protrusion deforms the ventral thecal sac.   Bilateral uncinate hypertrophy. Mild right neural foraminal narrowing.    C7-T1: No spinal canal stenosis or neural foraminal narrowing.    MRI LUMBAR SPINE:    Lumbarization of the S1 vertebral body.    Vertebral body height, marrow signal homogeneity,and facet alignment are   maintained throughout the visualized spinal segments.    Mild disc space narrowing at L5-S1.    The conus is normal in size, position, and signal characteristics, ending   at L2.    No abnormal enhancement in the lumbar region.    T12-L1: No spinal canal stenosis or neural foraminal narrowing.    L1-L2: No spinal canal stenosis or neural foraminal narrowing. Mild facet   hypertrophy.    L2-L3: No spinal canal stenosis or neural foraminal narrowing. Mild facet   hypertrophy.    L3-L4: No spinal canal stenosis or neural foraminal narrowing. Bilateral   facet/ligamentous hypertrophy.    L4-L5: Mild broad-based disc protrusion minimally deforms the ventral   thecal sac. Bilateral facet and ligamentous hypertrophy. No neural   foraminal narrowing.    L5-S1: No spinal canal stenosis or neural foraminal narrowing. Bilateral   facet hypertrophy.    IMPRESSION:    MRI CERVICAL SPINE:  No evidence for metastatic disease.  Mild degenerative changes.    MRI LUMBAR SPINE:  Noevidence for metastatic disease.  Mild degenerative changes.         CT Head No Cont (01.18.23 @ 09:25)     IMPRESSION: No acute intracranial hemorrhage or mass effect.      CT Lumbar Spine Reform No Cont (01.18.23 @ 09:58)>    IMPRESSION: No acute fractures or dislocations are seen.    Degenerativechanges as described above      CT Thoracic Spine Reform No Cont (01.18.23 @ 10:20)     Axial coronal and sagittal reconstructed images were performed through   the thoracic and lumbar spine region. IV contrast is identified from   abdominal CT scan.    Reversal of the normal thoracic spine is identified    Disc space narrowing endplate sclerotic changes and osteophytes are seen   involving the T3-T10 level. These findings are secondary to chronic   degenerative change    There is a grade 1 anterolisthesis seen involving L5 and S1 which is   secondary to chronic degenerative change.    No acute fractures ordislocations are identified    Evaluation of the paraspinal soft tissues appear unremarkable    Right-sided pleural effusion is identified.    Right-sided renal cyst is seen.    IMPRESSION: No acute fractures or dislocations are seen.    Degenerative changes as described above  
[facility] Division of Hospital Medicine  Neelam Fernandez MD M-F, 8A-5P: MS Teams, Pager: [phone]  Other Times: Ext: [phone], Pager: [phone]      Patient is a 62y old  Female who presents with a chief complaint of back pain (20 Jan 2023 14:53)      SUBJECTIVE / OVERNIGHT EVENTS:  Patient was examined this morning. She states she has some lower back pain and walked using walker earlier. No other complaint. Rest of the ROS neg. Discharge plan, med management and follow up need discussed.       ADDITIONAL REVIEW OF SYSTEMS:    MEDICATIONS  (STANDING):  budesonide  80 MICROgram(s)/formoterol 4.5 MICROgram(s) Inhaler 2 Puff(s) Inhalation two times a day  furosemide    Tablet 20 milliGRAM(s) Oral daily  lidocaine   4% Patch 1 Patch Transdermal daily  metoprolol succinate ER 25 milliGRAM(s) Oral daily  tiotropium 2.5 MICROgram(s) Inhaler 2 Puff(s) Inhalation daily    MEDICATIONS  (PRN):  acetaminophen     Tablet .. 650 milliGRAM(s) Oral every 6 hours PRN Temp greater or equal to 38C (100.4F), Mild Pain (1 - 3)  albuterol    90 MICROgram(s) HFA Inhaler 1 Puff(s) Inhalation four times a day PRN Bronchospasm  cyclobenzaprine 5 milliGRAM(s) Oral three times a day PRN Muscle Spasm  ibuprofen  Tablet. 600 milliGRAM(s) Oral three times a day PRN Moderate Pain (4 - 6)      CAPILLARY BLOOD GLUCOSE          I&O's Summary    20 Jan 2023 07:01  -  21 Jan 2023 07:00  --------------------------------------------------------  IN: 960 mL / OUT: 0 mL / NET: 960 mL        Daily     Daily     PHYSICAL EXAM:  Vital Signs Last 24 Hrs  T(C): 37.4 (21 Jan 2023 12:41), Max: 37.9 (20 Jan 2023 20:48)  T(F): 99.4 (21 Jan 2023 12:41), Max: 100.2 (20 Jan 2023 20:48)  HR: 98 (21 Jan 2023 12:41) (76 - 98)  BP: 111/74 (21 Jan 2023 12:41) (106/70 - 130/83)  BP(mean): --  RR: 18 (21 Jan 2023 12:41) (18 - 19)  SpO2: 97% (21 Jan 2023 12:41) (95% - 100%)    Parameters below as of 21 Jan 2023 12:41  Patient On (Oxygen Delivery Method): room air      CONSTITUTIONAL: NAD, well-groomed  EYES:  EOMI, conjunctiva and sclera clear  ENMT: Moist oral mucosa  NECK: Supple, no JVD  RESPIRATORY: Normal respiratory effort; lungs are clear to auscultation bilaterally  CARDIOVASCULAR: Regular rate and rhythm, normal S1 and S2, no murmur/rub/gallop; No lower extremity edema  ABDOMEN: normoactive bowel sounds, soft, nontender to palpation, no distension   MUSCULOSKELETAL:  extremities non tender. moving all extremities   PSYCH: A+O x3; affect appropriate, calm and cooperative  NEUROLOGY: CN 2-12 are intact and symmetric; no gross sensory deficits       LABS:                        12.2   9.03  )-----------( 283      ( 20 Jan 2023 16:11 )             36.6     01-20    140  |  102  |  14  ----------------------------<  100<H>  4.0   |  25  |  0.74    Ca    9.5      20 Jan 2023 16:11        PT/INR - ( 21 Jan 2023 06:53 )   PT: 38.0 sec;   INR: 3.24 ratio         PTT - ( 21 Jan 2023 06:53 )  PTT:40.4 sec              RADIOLOGY & ADDITIONAL TESTS:  Results Reviewed:   Imaging Personally Reviewed:  Electrocardiogram Personally Reviewed:    COORDINATION OF CARE:  Care Discussed with Consultants/Other Providers [Y/N]:  Prior or Outpatient Records Reviewed [Y/N]:  
NEUROLOGY FOLLOW-UP CONSULT NOTE    RFC: lower back pain    Interval history: No acute neurologic events overnight.    Meds:  MEDICATIONS  (STANDING):  budesonide  80 MICROgram(s)/formoterol 4.5 MICROgram(s) Inhaler 2 Puff(s) Inhalation two times a day  furosemide    Tablet 20 milliGRAM(s) Oral daily  lidocaine   4% Patch 1 Patch Transdermal daily  metoprolol succinate ER 25 milliGRAM(s) Oral daily  potassium chloride    Tablet ER 20 milliEquivalent(s) Oral once  tiotropium 2.5 MICROgram(s) Inhaler 2 Puff(s) Inhalation daily    MEDICATIONS  (PRN):  acetaminophen     Tablet .. 650 milliGRAM(s) Oral every 6 hours PRN Temp greater or equal to 38C (100.4F), Mild Pain (1 - 3)  albuterol    90 MICROgram(s) HFA Inhaler 1 Puff(s) Inhalation four times a day PRN Bronchospasm  cyclobenzaprine 5 milliGRAM(s) Oral three times a day PRN Muscle Spasm  ibuprofen  Tablet. 600 milliGRAM(s) Oral three times a day PRN Moderate Pain (4 - 6)      PMHx/PSHx/FHx/SHx:  Weakness    History of Sprained Right Hamstring Insertion    Abnormal coagulation profile    Allergic rhinitis, unspecified    Anorexia    Cardiac arrhythmia, unspecified    Chronic combined systolic (congestive) and diastolic (congestive) heart failure    Disorder of pigmentation, unspecified    Emphysema, unspecified    Encounter for follow-up examination after completed treatment for conditions other than malignant neoplasm    Encounter for general adult medical examination without abnormal findings    Encounter for immunization    Encounter for other screening for malignant neoplasm of breast    Encounter for prophylactic measures, unspecified    Encounter for screening for malignant neoplasm of cervix    Encounter for screening for malignant neoplasm of colon    Encounter for therapeutic drug level monitoring    Eosinophilic asthma    Essential (primary) hypertension    Heart failure, unspecified    Hypertensive heart disease with heart failure    Localized edema    Localized swelling, mass and lump, right lower limb    Long term (current) use of anticoagulants    Long term (current) use of non-steroidal anti-inflammatories (nsaid)    Nonrheumatic mitral (valve) insufficiency    Other chest pain    Other ill-defined heart diseases    Other melanin hyperpigmentation    Other specified abnormal immunological findings in serum    Pain in right leg    Personal history of other diseases of the circulatory system    Personal history of other specified conditions    Pulmonary hypertension, unspecified    Shortness of breath    Snoring    Unspecified asthma with status asthmaticus    Unspecified asthma, uncomplicated    Unspecified atrial fibrillation    Unspecified skin changes    No pertinent family history in first degree relatives    FH: gastric cancer (Father)    FH: HTN (hypertension) (Mother)    FH: aneurysm (Mother, Sibling)    Handoff    MEWS Score    No pertinent past medical history    COVID-19 vaccine series completed    HTN (hypertension)    Asthma    H/O aneurysm    Mild pulmonary hypertension    Chronic systolic congestive heart failure    Chronic atrial fibrillation    Weakness    Back pain    Breast nodule    Chronic atrial fibrillation    Asthma    Chronic systolic congestive heart failure    Mild pulmonary hypertension    White matter periventricular infarction    No significant past surgical history    BACK PAIN    17    [email or web address]        Allergies:  No Known Allergies      ROS: All systems negative except as documented in Interval history    O:  T(C): 36.7 (01-19-23 @ 10:41), Max: 37.2 (01-18-23 @ 18:00)  HR: 91 (01-19-23 @ 11:27) (63 - 91)  BP: 123/78 (01-19-23 @ 11:27) (113/72 - 135/84)  RR: 18 (01-19-23 @ 10:41) (18 - 18)  SpO2: 98% (01-19-23 @ 10:41) (98% - 100%)    Focused neurologic exam:  MS - AAO x3, speech fluent, rep/naming intact, follows commands, attn/conc/recent and remote memory/fund of knowledge WNL  CN - PERRLA, EOMI, VFF, face sens/str/hearing WNL b/l, tongue/palate midline, trap 5/5 b/l  Motor - Normal bulk/tone, 5/5 all except left hip flexion 4+/5  Sens - LT/temp/vib intact all  DTR's - 2+ all and downgoing b/l plantar response  Coord - FtN intact b/l  Gait and station - wide gait with walker    Pertinent labs/studies:    LABS:  01-18 @ 09:19 Creatine 137 U/L [25 - 170]  cret                        11.9   11.19 )-----------( 171      ( 18 Jan 2023 09:19 )             36.6     01-19    140  |  102  |  14  ----------------------------<  103<H>  3.4<L>   |  26  |  0.72    Ca    9.6      19 Jan 2023 07:10  Phos  4.2     01-18  Mg     1.9     01-18    TPro  7.3  /  Alb  3.4  /  TBili  0.4  /  DBili  x   /  AST  25  /  ALT  19  /  AlkPhos  109  01-19    PT/INR - ( 19 Jan 2023 07:11 )   PT: 54.9 sec;   INR: 4.66 ratio         PTT - ( 19 Jan 2023 07:11 )  PTT:46.1 sec     CT Head No Cont (01.18.23 @ 09:25)     IMPRESSION: No acute intracranial hemorrhage or mass effect.      CT Lumbar Spine Reform No Cont (01.18.23 @ 09:58)>    IMPRESSION: No acute fractures or dislocations are seen.    Degenerativechanges as described above      CT Thoracic Spine Reform No Cont (01.18.23 @ 10:20)     Axial coronal and sagittal reconstructed images were performed through   the thoracic and lumbar spine region. IV contrast is identified from   abdominal CT scan.    Reversal of the normal thoracic spine is identified    Disc space narrowing endplate sclerotic changes and osteophytes are seen   involving the T3-T10 level. These findings are secondary to chronic   degenerative change    There is a grade 1 anterolisthesis seen involving L5 and S1 which is   secondary to chronic degenerative change.    No acute fractures ordislocations are identified    Evaluation of the paraspinal soft tissues appear unremarkable    Right-sided pleural effusion is identified.    Right-sided renal cyst is seen.    IMPRESSION: No acute fractures or dislocations are seen.    Degenerativechanges as described above  
[facility] Division of Hospital Medicine  Neelam Fernandez MD M-F, 8A-5P: MS Teams, Pager: [phone]  Other Times: Ext: [phone], Pager: [phone]      Patient is a 62y old  Female who presents with a chief complaint of back pain (19 Jan 2023 15:12)      SUBJECTIVE / OVERNIGHT EVENTS:  Patient was examined this morning. She denies any acute complaint. States she is walking ok without any issues. Denies headache, dizziness, chest pain, palpitations, dyspnea, cough, nausea, abdominal pain, any weakness. Not complaining of pain currently.       ADDITIONAL REVIEW OF SYSTEMS:    MEDICATIONS  (STANDING):  budesonide  80 MICROgram(s)/formoterol 4.5 MICROgram(s) Inhaler 2 Puff(s) Inhalation two times a day  furosemide    Tablet 20 milliGRAM(s) Oral daily  lidocaine   4% Patch 1 Patch Transdermal daily  metoprolol succinate ER 25 milliGRAM(s) Oral daily  tiotropium 2.5 MICROgram(s) Inhaler 2 Puff(s) Inhalation daily    MEDICATIONS  (PRN):  acetaminophen     Tablet .. 650 milliGRAM(s) Oral every 6 hours PRN Temp greater or equal to 38C (100.4F), Mild Pain (1 - 3)  albuterol    90 MICROgram(s) HFA Inhaler 1 Puff(s) Inhalation four times a day PRN Bronchospasm  cyclobenzaprine 5 milliGRAM(s) Oral three times a day PRN Muscle Spasm  ibuprofen  Tablet. 600 milliGRAM(s) Oral three times a day PRN Moderate Pain (4 - 6)      CAPILLARY BLOOD GLUCOSE          I&O's Summary    19 Jan 2023 07:01  -  20 Jan 2023 07:00  --------------------------------------------------------  IN: 300 mL / OUT: 0 mL / NET: 300 mL        Daily     Daily     PHYSICAL EXAM:  Vital Signs Last 24 Hrs  T(C): 37.2 (20 Jan 2023 04:11), Max: 37.2 (20 Jan 2023 04:11)  T(F): 98.9 (20 Jan 2023 04:11), Max: 98.9 (20 Jan 2023 04:11)  HR: 91 (20 Jan 2023 04:11) (84 - 91)  BP: 113/77 (20 Jan 2023 04:11) (109/71 - 113/77)  BP(mean): --  RR: 18 (20 Jan 2023 04:11) (18 - 18)  SpO2: 96% (20 Jan 2023 04:11) (96% - 99%)    Parameters below as of 20 Jan 2023 04:11  Patient On (Oxygen Delivery Method): room air        CONSTITUTIONAL: NAD, well-groomed  EYES:  EOMI, conjunctiva and sclera clear  ENMT: Moist oral mucosa  NECK: Supple, no JVD  RESPIRATORY: Normal respiratory effort; lungs are clear to auscultation bilaterally  CARDIOVASCULAR: Regular rate and rhythm, normal S1 and S2, no murmur/rub/gallop; No lower extremity edema  ABDOMEN: normoactive bowel sounds, soft, nontender to palpation, no distension   MUSCULOSKELETAL:  extremities non tender. moving all extremities   PSYCH: A+O x3; affect appropriate, calm and cooperative  NEUROLOGY: CN 2-12 are intact and symmetric; no gross sensory deficits       LABS:    01-20    139  |  102  |  12  ----------------------------<  97  4.5   |  24  |  0.73    Ca    9.5      20 Jan 2023 06:49    TPro  7.3  /  Alb  3.4  /  TBili  0.4  /  DBili  x   /  AST  25  /  ALT  19  /  AlkPhos  109  01-19    LIVER FUNCTIONS - ( 19 Jan 2023 07:10 )  Alb: 3.4 g/dL / Pro: 7.3 g/dL / ALK PHOS: 109 U/L / ALT: 19 U/L / AST: 25 U/L / GGT: x           PT/INR - ( 20 Jan 2023 06:49 )   PT: 47.1 sec;   INR: 4.00 ratio         PTT - ( 20 Jan 2023 06:49 )  PTT:43.8 sec              RADIOLOGY & ADDITIONAL TESTS:  Results Reviewed:   Imaging Personally Reviewed:  Electrocardiogram Personally Reviewed:    COORDINATION OF CARE:  Care Discussed with Consultants/Other Providers [Y/N]:  Prior or Outpatient Records Reviewed [Y/N]:  
[facility] Division of Hospital Medicine  Duy Aguilera MD, RUI  I'm reachable on [employer] Teams   Off hours:  [phone] ([facility] pager)    Patient is a 62y old  Female who presents with a chief complaint of back pain (18 Jan 2023 16:24)      SUBJECTIVE / OVERNIGHT EVENTS:  No events overnight. C/o right upper leg MSK pain - reproducible to ROM exercise and tender to palpation     MEDICATIONS  (STANDING):  budesonide  80 MICROgram(s)/formoterol 4.5 MICROgram(s) Inhaler 2 Puff(s) Inhalation two times a day  furosemide    Tablet 20 milliGRAM(s) Oral daily  lidocaine   4% Patch 1 Patch Transdermal daily  metoprolol succinate ER 25 milliGRAM(s) Oral daily  potassium chloride    Tablet ER 20 milliEquivalent(s) Oral once  tiotropium 2.5 MICROgram(s) Inhaler 2 Puff(s) Inhalation daily    MEDICATIONS  (PRN):  acetaminophen     Tablet .. 650 milliGRAM(s) Oral every 6 hours PRN Temp greater or equal to 38C (100.4F), Mild Pain (1 - 3)  albuterol    90 MICROgram(s) HFA Inhaler 1 Puff(s) Inhalation four times a day PRN Bronchospasm  cyclobenzaprine 5 milliGRAM(s) Oral three times a day PRN Muscle Spasm  ibuprofen  Tablet. 600 milliGRAM(s) Oral three times a day PRN Moderate Pain (4 - 6)      PHYSICAL EXAM:  Vital Signs Last 24 Hrs  T(C): 36.7 (19 Jan 2023 10:41), Max: 37.2 (18 Jan 2023 18:00)  T(F): 98 (19 Jan 2023 10:41), Max: 99 (18 Jan 2023 18:00)  HR: 91 (19 Jan 2023 11:27) (63 - 91)  BP: 123/78 (19 Jan 2023 11:27) (113/72 - 135/84)  BP(mean): --  RR: 18 (19 Jan 2023 10:41) (18 - 18)  SpO2: 98% (19 Jan 2023 10:41) (98% - 100%)    Parameters below as of 19 Jan 2023 10:41  Patient On (Oxygen Delivery Method): room air        CONSTITUTIONAL: NAD, well-groomed  EYES:  EOMI, conjunctiva and sclera clear  ENMT: Moist oral mucosa  NECK: Supple, no JVD  RESPIRATORY: Normal respiratory effort; lungs are clear to auscultation bilaterally  CARDIOVASCULAR: Regular rate and rhythm, normal S1 and S2, no murmur/rub/gallop; No lower extremity edema  ABDOMEN: normoactive bowel sounds, soft, nontender to palpation, no distension   MUSCULOSKELETAL:  right quadracept tenderness to palpation on exam. RLE ROM limited by pain/discomfort.   PSYCH: A+O x3; affect appropriate, calm and cooperative  NEUROLOGY: CN 2-12 are intact and symmetric; no gross sensory deficits     LABS:                        11.9   11.19 )-----------( 171      ( 18 Jan 2023 09:19 )             36.6     01-19    140  |  102  |  14  ----------------------------<  103<H>  3.4<L>   |  26  |  0.72    Ca    9.6      19 Jan 2023 07:10  Phos  4.2     01-18  Mg     1.9     01-18    TPro  7.3  /  Alb  3.4  /  TBili  0.4  /  DBili  x   /  AST  25  /  ALT  19  /  AlkPhos  109  01-19    PT/INR - ( 19 Jan 2023 07:11 )   PT: 54.9 sec;   INR: 4.66 ratio         PTT - ( 19 Jan 2023 07:11 )  PTT:46.1 sec  CARDIAC MARKERS ( 18 Jan 2023 09:19 )  x     / x     / 137 U/L / x     / x

## 2023-01-21 NOTE — PROGRESS NOTE ADULT - PROBLEM SELECTOR PROBLEM 7
White matter periventricular infarction

## 2023-01-21 NOTE — PROGRESS NOTE ADULT - PROBLEM SELECTOR PLAN 2
-Mildly enlarged right supraclavicular lymph node measures 1.4 x 1.2 cm (10:28), nonspecific yet increased in size   compared to prior exam. Right superior medial breast ill-defined attenuation measures approximately 1.4 cm(10:135), in region of previously described 2.3 cm nodule. Similar right lateral breast nodule measures 0.8 cm.   -Patient says she has not had a mammography, findings might be consistent with a malignancy. Additionally, on last outpatient visit patient was referred for EGD/colonoscopy (family history of gastric ca) due to weight loss.    >     > Awaiting inpatient breast MRI : pending approval from radiology   >     > If Breast MRI can't be done inpatient, then patient will need to follow up outpatient for breast MRI, biopsy of supraclavicular LN.
-Mildly enlarged right supraclavicular lymph node measures 1.4 x 1.2 cm (10:28), nonspecific yet increased in size   compared to prior exam. Right superior medial breast ill-defined attenuation measures approximately 1.4 cm(10:135), in region of previously described 2.3 cm nodule. Similar right lateral breast nodule measures 0.8 cm.   -Patient says she has not had a mammography, findings might be consistent with a malignancy. Additionally, on last outpatient visit patient was referred for EGD/colonoscopy (family history of gastric ca) due to weight loss.    >     > Breast MRI can't be done inpatient, patient needs to follow up outpatient for breast MRI, biopsy of supraclavicular LN.
Mildly enlarged right supraclavicular lymph node measures 1.4 x 1.2 cm (10:28), nonspecific yet increased in size   compared to prior exam. Right superior medial breast ill-defined attenuation measures approximately 1.4 cm (10:135), in region of previously described 2.3 cm nodule. Similar right lateral breast nodule measures 0.8 cm.   Patient says she has not had a mammography, findings might be consistent with a malignancy.   Will decide between further work up inpatient vs outpatient. This type of malignancy normally is performed outpatient.   Can consider further breast imaging, biopsy of supraclavicular LN.  Additionally, on last outpatient visit patient was referred for EGD/colonoscopy (family history of gastric ca) due to weight loss.  d/w radiology - inpatient breast MRIs only done for those needing anesthesia-will need approval from radiology tomorrow.

## 2023-01-21 NOTE — PROGRESS NOTE ADULT - PROBLEM SELECTOR PLAN 7
Cerebral hemispheric white matter lesions consistent with ischemic white matter disease, somewhat advanced   for age. Unclear significance.      PT- rec GIOVANNI when medically ready
Cerebral hemispheric white matter lesions consistent with ischemic white matter disease, somewhat advanced   for age. Unclear significance.
Cerebral hemispheric white matter lesions consistent with ischemic white matter disease, somewhat advanced   for age. Unclear significance.

## 2023-01-21 NOTE — PROGRESS NOTE ADULT - PROBLEM SELECTOR PLAN 1
#Acute lower back (mid) pain with radiation to R leg  Differential includes disc herniation, MSK muscle spasm, cord compression, ?metastatic disease (in setting of 2 breast nodules and R supraclavicular LN)    -Neuro input appreciated - exam wnl for them except slight LUE weakness (may be limited due to pain) and hyper reflexes which are symmetric  -Pan CT negative for fracture, cord compression  -MRI C and L spine: No evidence for metastatic disease. Mild degenerative changes.    -Discussed with neuro team 1/20: patient to follow up outpatient orthopedic surgery and neurologist outpatient for EMG study with Dr Vesna Flor  -Continue Pain management with motrin 600mg TID and lidoderm patch, tylenol, flexeril PRN  -Neuro checks q4hrs
#Acute lower back (mid) pain with radiation to R leg  Differential includes disc herniation, MSK muscle spasm, cord compression, ?metastatic disease (in setting of 2 breast nodules and R supraclavicular LN)    -Neuro input appreciated - exam wnl for them except slight LUE weakness (may be limited due to pain) and hyper reflexes which are symmetric  -Pan CT negative for fracture, cord compression  -MRI C and L spine: No evidence for metastatic disease. Mild degenerative changes.    -Discussed with neuro team 1/20: patient to follow up outpatient orthopedic surgery and neurologist outpatient for EMG study with Dr Vesna Flor  -Continue Pain management with motrin 600mg TID and lidoderm patch, tylenol, flexeril PRN  -Neuro checks q4hrs
Acute lower back (mid) pain with radiation to R leg  Differential includes disc herniation, MSK muscle spasm, metastatic lesion?  Neuro input appreciated - exam wnl for them except slight LUE weakness (may be limited due to pain) and hyper reflexes which are symmetric  pan CT negative for fracture, cord compression  Pending MRI with/without contrast of C spine and L spine to eval for herniated disc, cord compression, ?metastatic disease (in setting of 2 breast nodules and R supraclavicular LN)  for now pain management with motrin 600mg TID and lidoderm patch, tylenol, flexeril PRN  Neuro checks q4hrs

## 2023-01-22 ENCOUNTER — TRANSCRIPTION ENCOUNTER (OUTPATIENT)
Age: 63
End: 2023-01-22

## 2023-01-22 VITALS
RESPIRATION RATE: 18 BRPM | DIASTOLIC BLOOD PRESSURE: 75 MMHG | TEMPERATURE: 100 F | OXYGEN SATURATION: 95 % | HEART RATE: 80 BPM | SYSTOLIC BLOOD PRESSURE: 114 MMHG

## 2023-01-22 LAB
INR BLD: 2.89 RATIO — HIGH (ref 0.88–1.16)
PROTHROM AB SERPL-ACNC: 33.6 SEC — HIGH (ref 10.5–13.4)

## 2023-01-22 PROCEDURE — 82550 ASSAY OF CK (CPK): CPT

## 2023-01-22 PROCEDURE — 72156 MRI NECK SPINE W/O & W/DYE: CPT

## 2023-01-22 PROCEDURE — 83735 ASSAY OF MAGNESIUM: CPT

## 2023-01-22 PROCEDURE — 82746 ASSAY OF FOLIC ACID SERUM: CPT

## 2023-01-22 PROCEDURE — 97161 PT EVAL LOW COMPLEX 20 MIN: CPT

## 2023-01-22 PROCEDURE — 84100 ASSAY OF PHOSPHORUS: CPT

## 2023-01-22 PROCEDURE — 93005 ELECTROCARDIOGRAM TRACING: CPT

## 2023-01-22 PROCEDURE — 96365 THER/PROPH/DIAG IV INF INIT: CPT

## 2023-01-22 PROCEDURE — 36415 COLL VENOUS BLD VENIPUNCTURE: CPT

## 2023-01-22 PROCEDURE — 84295 ASSAY OF SERUM SODIUM: CPT

## 2023-01-22 PROCEDURE — 85025 COMPLETE CBC W/AUTO DIFF WBC: CPT

## 2023-01-22 PROCEDURE — 86703 HIV-1/HIV-2 1 RESULT ANTBDY: CPT

## 2023-01-22 PROCEDURE — 85730 THROMBOPLASTIN TIME PARTIAL: CPT

## 2023-01-22 PROCEDURE — 85610 PROTHROMBIN TIME: CPT

## 2023-01-22 PROCEDURE — 85018 HEMOGLOBIN: CPT

## 2023-01-22 PROCEDURE — 82435 ASSAY OF BLOOD CHLORIDE: CPT

## 2023-01-22 PROCEDURE — 99285 EMERGENCY DEPT VISIT HI MDM: CPT

## 2023-01-22 PROCEDURE — 82330 ASSAY OF CALCIUM: CPT

## 2023-01-22 PROCEDURE — 73030 X-RAY EXAM OF SHOULDER: CPT

## 2023-01-22 PROCEDURE — 85014 HEMATOCRIT: CPT

## 2023-01-22 PROCEDURE — 71275 CT ANGIOGRAPHY CHEST: CPT | Mod: MA

## 2023-01-22 PROCEDURE — 80048 BASIC METABOLIC PNL TOTAL CA: CPT

## 2023-01-22 PROCEDURE — 84132 ASSAY OF SERUM POTASSIUM: CPT

## 2023-01-22 PROCEDURE — 70496 CT ANGIOGRAPHY HEAD: CPT | Mod: MA

## 2023-01-22 PROCEDURE — 82803 BLOOD GASES ANY COMBINATION: CPT

## 2023-01-22 PROCEDURE — 97116 GAIT TRAINING THERAPY: CPT

## 2023-01-22 PROCEDURE — A9585: CPT

## 2023-01-22 PROCEDURE — 82306 VITAMIN D 25 HYDROXY: CPT

## 2023-01-22 PROCEDURE — 84425 ASSAY OF VITAMIN B-1: CPT

## 2023-01-22 PROCEDURE — 99239 HOSP IP/OBS DSCHRG MGMT >30: CPT

## 2023-01-22 PROCEDURE — 85027 COMPLETE CBC AUTOMATED: CPT

## 2023-01-22 PROCEDURE — G0378: CPT

## 2023-01-22 PROCEDURE — 73020 X-RAY EXAM OF SHOULDER: CPT

## 2023-01-22 PROCEDURE — 94640 AIRWAY INHALATION TREATMENT: CPT

## 2023-01-22 PROCEDURE — 70450 CT HEAD/BRAIN W/O DYE: CPT | Mod: MA

## 2023-01-22 PROCEDURE — 80053 COMPREHEN METABOLIC PANEL: CPT

## 2023-01-22 PROCEDURE — 82947 ASSAY GLUCOSE BLOOD QUANT: CPT

## 2023-01-22 PROCEDURE — 84446 ASSAY OF VITAMIN E: CPT

## 2023-01-22 PROCEDURE — 83605 ASSAY OF LACTIC ACID: CPT

## 2023-01-22 PROCEDURE — 70498 CT ANGIOGRAPHY NECK: CPT | Mod: MA

## 2023-01-22 PROCEDURE — 87637 SARSCOV2&INF A&B&RSV AMP PRB: CPT

## 2023-01-22 PROCEDURE — 74174 CTA ABD&PLVS W/CONTRAST: CPT | Mod: MA

## 2023-01-22 PROCEDURE — 82607 VITAMIN B-12: CPT

## 2023-01-22 PROCEDURE — 84484 ASSAY OF TROPONIN QUANT: CPT

## 2023-01-22 PROCEDURE — 86803 HEPATITIS C AB TEST: CPT

## 2023-01-22 PROCEDURE — 72158 MRI LUMBAR SPINE W/O & W/DYE: CPT

## 2023-01-22 RX ORDER — CYCLOBENZAPRINE HYDROCHLORIDE 10 MG/1
1 TABLET, FILM COATED ORAL
Qty: 30 | Refills: 0
Start: 2023-01-22 | End: 2023-01-31

## 2023-01-22 RX ORDER — ACETAMINOPHEN 500 MG
2 TABLET ORAL
Qty: 0 | Refills: 0 | DISCHARGE
Start: 2023-01-22

## 2023-01-22 RX ORDER — LIDOCAINE 4 G/100G
1 CREAM TOPICAL
Qty: 7 | Refills: 0
Start: 2023-01-22 | End: 2023-01-28

## 2023-01-22 RX ORDER — PETROLATUM,WHITE
1 JELLY (GRAM) TOPICAL
Qty: 0 | Refills: 0 | DISCHARGE
Start: 2023-01-22

## 2023-01-22 RX ORDER — PANTOPRAZOLE SODIUM 20 MG/1
1 TABLET, DELAYED RELEASE ORAL
Qty: 14 | Refills: 0
Start: 2023-01-22 | End: 2023-02-04

## 2023-01-22 RX ORDER — CYCLOBENZAPRINE HYDROCHLORIDE 10 MG/1
1 TABLET, FILM COATED ORAL
Qty: 90 | Refills: 0
Start: 2023-01-22 | End: 2023-02-20

## 2023-01-22 RX ORDER — WARFARIN SODIUM 2.5 MG/1
2.5 TABLET ORAL ONCE
Refills: 0 | Status: COMPLETED | OUTPATIENT
Start: 2023-01-22 | End: 2023-01-22

## 2023-01-22 RX ORDER — WARFARIN SODIUM 2.5 MG/1
1 TABLET ORAL
Qty: 3 | Refills: 0
Start: 2023-01-22 | End: 2023-01-24

## 2023-01-22 RX ORDER — ALBUTEROL 90 UG/1
3 AEROSOL, METERED ORAL
Qty: 0 | Refills: 0 | DISCHARGE

## 2023-01-22 RX ADMIN — WARFARIN SODIUM 2.5 MILLIGRAM(S): 2.5 TABLET ORAL at 16:34

## 2023-01-22 RX ADMIN — BUDESONIDE AND FORMOTEROL FUMARATE DIHYDRATE 2 PUFF(S): 160; 4.5 AEROSOL RESPIRATORY (INHALATION) at 06:02

## 2023-01-22 RX ADMIN — LIDOCAINE 1 PATCH: 4 CREAM TOPICAL at 00:00

## 2023-01-22 RX ADMIN — Medication 1 APPLICATION(S): at 06:03

## 2023-01-22 RX ADMIN — Medication 1 APPLICATION(S): at 13:12

## 2023-01-22 RX ADMIN — Medication 25 MILLIGRAM(S): at 06:02

## 2023-01-22 RX ADMIN — PANTOPRAZOLE SODIUM 40 MILLIGRAM(S): 20 TABLET, DELAYED RELEASE ORAL at 13:11

## 2023-01-22 RX ADMIN — LIDOCAINE 1 PATCH: 4 CREAM TOPICAL at 13:12

## 2023-01-22 RX ADMIN — Medication 20 MILLIGRAM(S): at 06:02

## 2023-01-22 RX ADMIN — TIOTROPIUM BROMIDE 2 PUFF(S): 18 CAPSULE ORAL; RESPIRATORY (INHALATION) at 13:11

## 2023-01-22 NOTE — CHART NOTE - NSCHARTNOTEFT_GEN_A_CORE
1 attempt were made to reach patient, which have been unsuccessful. Unable to leave voicemail on 01/22/2023.
Request from Dr. Fernandez to facilitate patient discharge. Medication reconciliation reviewed, revised, and resolved with Dr. Fernandez who had medically cleared patient for discharge with follow-up as advised. Please refer to discharge note for detailed hospital course. Patient is currently stable for discharge to home at this time.      Rayne Bernal PA-C  Dept of Medicine   Contact #[phone]
Left (1) message(s) for patient in regards to follow up care with callback information.

## 2023-01-22 NOTE — DISCHARGE NOTE NURSING/CASE MANAGEMENT/SOCIAL WORK - PATIENT PORTAL LINK FT
You can access the FollowMyHealth Patient Portal offered by [facility] by registering at the following website: [email or web address]. By joining [facility]’s FollowMyHealth portal, you will also be able to view your health information using other applications (apps) compatible with our system.

## 2023-01-22 NOTE — DISCHARGE NOTE NURSING/CASE MANAGEMENT/SOCIAL WORK - NSDCPEFALRISK_GEN_ALL_CORE
For information on Fall & Injury Prevention, visit: [email or web address] OR  [email or web address] OR  https://www.cdc.gov/steadi/patient.html

## 2023-01-22 NOTE — DISCHARGE NOTE NURSING/CASE MANAGEMENT/SOCIAL WORK - NSDCFUADDAPPT_GEN_ALL_CORE_FT
APPTS ARE READY TO BE MADE: [X] YES    Best Family or Patient Contact (if needed):    Additional Information about above appointments (if needed):    1: Please follow up with Dr. Aguero in 1 day for PT/INR check  2:   3:     Other comments or requests:

## 2023-01-23 ENCOUNTER — NON-APPOINTMENT (OUTPATIENT)
Age: 63
End: 2023-01-23

## 2023-01-24 ENCOUNTER — APPOINTMENT (OUTPATIENT)
Dept: INTERNAL MEDICINE | Facility: CLINIC | Age: 63
End: 2023-01-24

## 2023-01-24 VITALS — SYSTOLIC BLOOD PRESSURE: 108 MMHG | OXYGEN SATURATION: 97 % | DIASTOLIC BLOOD PRESSURE: 56 MMHG | HEART RATE: 72 BPM

## 2023-01-24 LAB — VIT B1 SERPL-MCNC: 91.9 NMOL/L — SIGNIFICANT CHANGE UP (ref 66.5–200)

## 2023-01-25 ENCOUNTER — APPOINTMENT (OUTPATIENT)
Dept: CARDIOLOGY | Facility: CLINIC | Age: 63
End: 2023-01-25

## 2023-01-25 PROBLEM — I27.20 PULMONARY HYPERTENSION, UNSPECIFIED: Chronic | Status: ACTIVE | Noted: 2023-01-18

## 2023-01-25 PROBLEM — I50.22 CHRONIC SYSTOLIC (CONGESTIVE) HEART FAILURE: Chronic | Status: ACTIVE | Noted: 2023-01-18

## 2023-01-25 PROBLEM — I48.20 CHRONIC ATRIAL FIBRILLATION, UNSPECIFIED: Chronic | Status: ACTIVE | Noted: 2023-01-18

## 2023-01-26 LAB
A-TOCOPHEROL VIT E SERPL-MCNC: 9.8 MG/L — SIGNIFICANT CHANGE UP (ref 9–29)
BETA+GAMMA TOCOPHEROL SERPL-MCNC: 0.9 MG/L — SIGNIFICANT CHANGE UP (ref 0.5–4.9)

## 2023-01-27 ENCOUNTER — APPOINTMENT (OUTPATIENT)
Dept: INTERNAL MEDICINE | Facility: CLINIC | Age: 63
End: 2023-01-27
Payer: COMMERCIAL

## 2023-01-27 ENCOUNTER — NON-APPOINTMENT (OUTPATIENT)
Age: 63
End: 2023-01-27

## 2023-01-27 VITALS
WEIGHT: 150 LBS | OXYGEN SATURATION: 98 % | TEMPERATURE: 98.5 F | DIASTOLIC BLOOD PRESSURE: 78 MMHG | HEIGHT: 67 IN | BODY MASS INDEX: 23.54 KG/M2 | HEART RATE: 108 BPM | SYSTOLIC BLOOD PRESSURE: 114 MMHG

## 2023-01-27 DIAGNOSIS — M54.50 LOW BACK PAIN, UNSPECIFIED: ICD-10-CM

## 2023-01-27 PROCEDURE — 85610 PROTHROMBIN TIME: CPT | Mod: QW

## 2023-01-31 ENCOUNTER — APPOINTMENT (OUTPATIENT)
Dept: INTERNAL MEDICINE | Facility: CLINIC | Age: 63
End: 2023-01-31

## 2023-01-31 VITALS — SYSTOLIC BLOOD PRESSURE: 110 MMHG | HEART RATE: 77 BPM | DIASTOLIC BLOOD PRESSURE: 70 MMHG | OXYGEN SATURATION: 97 %

## 2023-02-01 ENCOUNTER — APPOINTMENT (OUTPATIENT)
Dept: GASTROENTEROLOGY | Facility: CLINIC | Age: 63
End: 2023-02-01
Payer: COMMERCIAL

## 2023-02-01 ENCOUNTER — NON-APPOINTMENT (OUTPATIENT)
Age: 63
End: 2023-02-01

## 2023-02-01 VITALS
BODY MASS INDEX: 23.54 KG/M2 | DIASTOLIC BLOOD PRESSURE: 75 MMHG | TEMPERATURE: 97.2 F | WEIGHT: 150 LBS | SYSTOLIC BLOOD PRESSURE: 110 MMHG | HEIGHT: 67 IN

## 2023-02-01 DIAGNOSIS — G47.33 OBSTRUCTIVE SLEEP APNEA (ADULT) (PEDIATRIC): ICD-10-CM

## 2023-02-01 PROCEDURE — 99203 OFFICE O/P NEW LOW 30 MIN: CPT

## 2023-02-06 NOTE — HISTORY OF PRESENT ILLNESS
[FreeTextEntry1] : 62F afib on coumadin, asthma, CHF, mixed restrictive and obstructive lung disease, pulmonary htn, recent hospital admission for acute low back pain and weakness. Imaging significant for breast nodules and supraclavicular lymph nodes. Hospital course c/b supratheraputic INR. \par \par Refered to GI for ?early satiety. \par Here today w her sister. \par Patient states she is here because of difficulty swallowing x 2 weeks. \par States she feels hungry but has difficulty swallowing solids.\par Feels that food is getting stuck in her chest. \par Typically only feels the sensation after a few bites -- initially is ok.\par No issues w liquids. \par Denies abd pain, n/v, heartburn, odynophagia, weight loss. \par \par Bowel movements are normal \par No prior egd/colon. \par Patient and sister deny fhx of gastric cancer, state their father had hx of lymphoma. \par \par

## 2023-02-06 NOTE — ASSESSMENT
[FreeTextEntry1] : 62F afib on coumadin, asthma, CHF, mixed restrictive and obstructive lung disease, pulmonary htn, recent hospital admission for acute low back pain, weakness, supratheraptueic INR, imaging w breast nodules + lymphadenopathy. Here today for management of episodic dysphagia to solids. No prior egd. No fhx of GI malignancy. \par \par - schedule xray esophagram to further evaluate\par - will consider EGD pending results of above \par - if endoscopy is pursued will need cards clearance \par - soft diet for now \par - manometry to r/o underlying motilitly studies depending on above \par \par RTC after xray esophagram

## 2023-02-07 ENCOUNTER — APPOINTMENT (OUTPATIENT)
Dept: INTERNAL MEDICINE | Facility: CLINIC | Age: 63
End: 2023-02-07

## 2023-02-07 VITALS
WEIGHT: 150 LBS | BODY MASS INDEX: 24.11 KG/M2 | HEART RATE: 100 BPM | TEMPERATURE: 97.6 F | SYSTOLIC BLOOD PRESSURE: 148 MMHG | HEIGHT: 66 IN | DIASTOLIC BLOOD PRESSURE: 96 MMHG | OXYGEN SATURATION: 99 %

## 2023-02-15 ENCOUNTER — APPOINTMENT (OUTPATIENT)
Dept: INTERNAL MEDICINE | Facility: CLINIC | Age: 63
End: 2023-02-15
Payer: COMMERCIAL

## 2023-02-15 ENCOUNTER — APPOINTMENT (OUTPATIENT)
Dept: DERMATOLOGY | Facility: CLINIC | Age: 63
End: 2023-02-15
Payer: COMMERCIAL

## 2023-02-15 VITALS — HEIGHT: 65 IN | BODY MASS INDEX: 24.99 KG/M2 | WEIGHT: 150 LBS

## 2023-02-15 DIAGNOSIS — L30.9 DERMATITIS, UNSPECIFIED: ICD-10-CM

## 2023-02-15 PROCEDURE — 36416 COLLJ CAPILLARY BLOOD SPEC: CPT

## 2023-02-15 PROCEDURE — 99204 OFFICE O/P NEW MOD 45 MIN: CPT

## 2023-02-15 PROCEDURE — 85610 PROTHROMBIN TIME: CPT | Mod: QW

## 2023-02-15 RX ORDER — AMMONIUM LACTATE 12 %
12 CREAM (GRAM) TOPICAL
Qty: 1 | Refills: 1 | Status: ACTIVE | COMMUNITY
Start: 2023-02-15 | End: 1900-01-01

## 2023-02-16 NOTE — PHYSICAL EXAM
[FreeTextEntry3] : AAOx3, NAD, well-appearing / pleasant\par Focused body exam performed\par hyperpigmentation periocularly and on cheeks\par no ocular hyperpigmentation\par \par hyperpigmentation and lichenification/xerosis on bilateral dorsal hands \par \par \par

## 2023-02-16 NOTE — ASSESSMENT
[FreeTextEntry1] : Periocular hyperpigmentation - favor possible exogenous ochronosis in the setting of hydroquinone cream\par New dx, unclear clinical course \par - trial of skin lightening cream (without hydroquinone) via [facility] - \par Azelaic Acid: 20%\par Kojic Acid: 6%\par Niacinamide: 4%\par apply once/night - start gradually and stop if any irritation. side effects discussed\par Reassess in 8 weeks -discussed laser as another possible option if no improvement \par \par Hyperpigmentation, dorsal hands\par Also with component of lichenification/xerosis\par - amlactin cream BID to treat xerosis/lichenification and reassess hyperpigmentation at next visit

## 2023-02-16 NOTE — HISTORY OF PRESENT ILLNESS
[FreeTextEntry1] : facial discoloration [de-identified] : here for evaluation of facial discoloration \par unsure of duration - daughter noticed on facetime\par using topical niacinamide 5% + hydroquinone cream for ~1 year\par \par also noticed some discoloration on hands\par no joint pains

## 2023-02-21 ENCOUNTER — APPOINTMENT (OUTPATIENT)
Dept: INTERNAL MEDICINE | Facility: CLINIC | Age: 63
End: 2023-02-21
Payer: COMMERCIAL

## 2023-02-21 ENCOUNTER — APPOINTMENT (OUTPATIENT)
Dept: PULMONOLOGY | Facility: CLINIC | Age: 63
End: 2023-02-21
Payer: COMMERCIAL

## 2023-02-21 VITALS
HEIGHT: 66 IN | TEMPERATURE: 97.2 F | BODY MASS INDEX: 24.11 KG/M2 | OXYGEN SATURATION: 93 % | HEART RATE: 70 BPM | SYSTOLIC BLOOD PRESSURE: 142 MMHG | DIASTOLIC BLOOD PRESSURE: 86 MMHG | RESPIRATION RATE: 16 BRPM | WEIGHT: 150 LBS

## 2023-02-21 VITALS
TEMPERATURE: 98.2 F | BODY MASS INDEX: 27.23 KG/M2 | DIASTOLIC BLOOD PRESSURE: 116 MMHG | SYSTOLIC BLOOD PRESSURE: 197 MMHG | WEIGHT: 148 LBS | HEART RATE: 74 BPM | HEIGHT: 62 IN | OXYGEN SATURATION: 96 %

## 2023-02-21 VITALS — DIASTOLIC BLOOD PRESSURE: 86 MMHG | SYSTOLIC BLOOD PRESSURE: 146 MMHG

## 2023-02-21 PROCEDURE — 94010 BREATHING CAPACITY TEST: CPT

## 2023-02-21 PROCEDURE — 94729 DIFFUSING CAPACITY: CPT

## 2023-02-21 PROCEDURE — 85610 PROTHROMBIN TIME: CPT | Mod: QW

## 2023-02-21 PROCEDURE — 99214 OFFICE O/P EST MOD 30 MIN: CPT | Mod: 25

## 2023-02-21 PROCEDURE — 36416 COLLJ CAPILLARY BLOOD SPEC: CPT

## 2023-02-21 PROCEDURE — ZZZZZ: CPT

## 2023-02-21 PROCEDURE — 94618 PULMONARY STRESS TESTING: CPT

## 2023-02-21 PROCEDURE — 94727 GAS DIL/WSHOT DETER LNG VOL: CPT

## 2023-02-21 NOTE — ADDENDUM
[FreeTextEntry1] : Documented by Fran Pineda acting as a scribe for Dr. Seth Curtis on 02/21/2023.\par \par All medical record entries made by the Scribe were at my, Dr. Seth Curtis's, direction and personally dictated by me on 02/21/2023. I have reviewed the chart and agree that the record accurately reflects my personal performance of the history, physical exam, assessment and plan. I have also personally directed, reviewed, and agree with the discharge instructions.

## 2023-02-21 NOTE — PROCEDURE
[FreeTextEntry1] : Sleep study (12/23/22) revealed no sleep apnea with an AHI/DARON of 1.1 and a low oxygen saturation of 94%\par \par 6 minute walk test reveals a low saturation of 92% with no evidence of dyspnea or fatigue; walked 391.2 meters\par \par Full PFT revealed normal flows, with a FEV1 of 2.13L, which is 83% of predicted, normal lung volumes, and a diffusion of 21.1, which is 102% of predicted, with a normal flow volume loop\par \par Feno was unable to be performed; a normal value being less than 25. Fractional exhaled nitric oxide (FENO) is regarded as a simple, noninvasive method for assessing eosinophilic airway inflammation. Produced by a variety of cells within the lung, nitric oxide (NO) concentrations are generally low in healthy individuals. However, high concentrations of NO appear to be involved in nonspecific host defense mechanisms and chronic inflammatory  diseases such as asthma. The American Thoracic Society (ATS) therefore recommended using FENO to aid in the diagnosis and monitoring of eosinophilic airway inflammation and asthma, and for identifying steroid responsive individuals whose chronic respiratory symptoms may be caused by airway inflammation \par

## 2023-02-21 NOTE — REASON FOR VISIT
[Follow-Up] : a follow-up visit [TextBox_44] : sob, mild persistent asthma, ?allergies, no JULIANNA, mild PAH

## 2023-02-21 NOTE — HISTORY OF PRESENT ILLNESS
[TextBox_4] : Ms. CHAMPION is a 62 year old female with a history of a fib, grade 3 diastolic dysfunction, positive messi, asthma presenting to the office today for follow up pulmonary evaluation. Her chief complaint is sob, mild persistent asthma, ?allergies, ?JULIANNA \par \par \par -she notes feeling generally well\par -she notes going to PT for her lower back\par -she notes her sinuses are non-problematic at the moment\par -she notes mild visual blurriness (stable)\par -she notes trouble swallowing\par -she notes staying hydrated\par -she notes her breathing is mostly under control except when she eats\par \par -patient denies any headaches, nausea, vomiting, fever, chills, sweats, chest pain, chest pressure, palpitations, coughing, wheezing, fatigue, diarrhea, constipation, dizziness, leg swelling, leg pain, itchy eyes, itchy ears, heartburn, reflux or sour taste in the mouth

## 2023-02-27 ENCOUNTER — APPOINTMENT (OUTPATIENT)
Dept: CARDIOLOGY | Facility: CLINIC | Age: 63
End: 2023-02-27
Payer: COMMERCIAL

## 2023-02-27 VITALS
BODY MASS INDEX: 27.25 KG/M2 | DIASTOLIC BLOOD PRESSURE: 82 MMHG | SYSTOLIC BLOOD PRESSURE: 148 MMHG | HEART RATE: 56 BPM | WEIGHT: 149 LBS | OXYGEN SATURATION: 97 %

## 2023-02-27 PROCEDURE — 99214 OFFICE O/P EST MOD 30 MIN: CPT

## 2023-02-27 NOTE — DISCUSSION/SUMMARY
[FreeTextEntry1] : Ms. Salvador has no dyspnea or symptoms of CHF.  However, she reports 2 syncopal episodes in the past year.  Her exam is unremarkable with no change in her weight, and irregular rhythm at a rate of about 80, normal blood pressure, clear lungs, and a normal cardiac exam.  An EKG done earlier this month shows atrial for but is otherwise unremarkable.\par \par I am unsure what to make of all this.  I explained to her that her symptoms were probably vagal.  She could have a loop recorder implanted and I discussed this with her.  She does not appear very interested.  She will continue on her current regimen and follow-up here in 6 months or as needed.\par

## 2023-02-27 NOTE — HISTORY OF PRESENT ILLNESS
[FreeTextEntry1] : 62-year-old female with a history of chronic atrial fibrillation associated with borderline systolic left ventricular dysfunction returns for follow.  She was advised to follow-up with EP, but never followed through.  She was hospitalized in January with back pain.  She states the reason the back pain occurred was that she passed out and fell, although its not noted in her chart.  She says the school thought it was "due to her medication" and she wants to know what happened.  She had another syncopal episode in 2022.  Both episodes were accompanied by prodromal symptoms and a brief period of unconsciousness typical of vagal events.

## 2023-02-27 NOTE — REVIEW OF SYSTEMS
[Fever] : no fever [Chills] : no chills [Recent Change In Weight] : ~T recent weight change [Chest Pain] : no chest pain [Shortness Of Breath] : shortness of breath [Dyspnea on Exertion] : dyspnea on exertion [Abdominal Pain] : no abdominal pain [Easy Bleeding] : no easy bleeding [Easy Bruising] : no easy bruising

## 2023-02-27 NOTE — HISTORY OF PRESENT ILLNESS
[de-identified] : Patient accompanied by sister for visit.  Patient here for hospital discharge follow up.  Patient reporting that she recently saw Derm, Pulmonary and GI.  Patient is taking medications as prescribed.  Patient needing assistance from pharmacy team with medication pill box being filled out.  Patient reporting that she has problems with remembering things.\par \par Patient reporting feeling unsteady on her feet at times. Doesn't recall what really happened prior to her hospital stay, believes that she may have fallen.  \par \par Patient wondering about some of the testing that was done during the hospital stay.

## 2023-02-27 NOTE — PHYSICAL EXAM
[No Acute Distress] : no acute distress [Normal Voice/Communication] : normal voice/communication [No Respiratory Distress] : no respiratory distress  [Clear to Auscultation] : lungs were clear to auscultation bilaterally [de-identified] : trace pitting edema bilatrerally.

## 2023-02-27 NOTE — DATA REVIEWED
[FreeTextEntry1] : imaging  and discharge summary from hospital stay reviewed. Following copied and pasted from hospital discharge summary. \par \par  mildly enlarged right supraclavicular lymph node measures 1.4 x 1.2 cm (10:28), nonspecific yet increased in size compared to prior exam. Right superior medial breast ill-defined attenuation measures approximately 1.4 cm(10:135), in region of previously described 2.3 cm nodule. Similar right lateral breast nodule measures 0.8 cm. Patient says she has not had a mammography, findings might be consistent with a malignancy. Additionally, on last outpatient visit patient was referred for EGD/colonoscopy (family history\par of gastric ca) due to weight loss. Given breast MRI unable to obtain inpatient, patient will need to follow up outpatient for breast MRI, biopsy of supraclavicular LN.\par

## 2023-02-27 NOTE — REVIEW OF SYSTEMS
[Feeling Fatigued] : not feeling fatigued [Dyspnea on exertion] : dyspnea during exertion [Dizziness] : dizziness [Negative] : Heme/Lymph

## 2023-02-28 ENCOUNTER — APPOINTMENT (OUTPATIENT)
Dept: INTERNAL MEDICINE | Facility: CLINIC | Age: 63
End: 2023-02-28
Payer: COMMERCIAL

## 2023-02-28 VITALS
DIASTOLIC BLOOD PRESSURE: 94 MMHG | TEMPERATURE: 98 F | SYSTOLIC BLOOD PRESSURE: 130 MMHG | WEIGHT: 148 LBS | HEART RATE: 91 BPM | OXYGEN SATURATION: 98 % | BODY MASS INDEX: 27.07 KG/M2

## 2023-02-28 PROCEDURE — 93793 ANTICOAG MGMT PT WARFARIN: CPT

## 2023-02-28 PROCEDURE — 85610 PROTHROMBIN TIME: CPT | Mod: QW

## 2023-02-28 PROCEDURE — 36416 COLLJ CAPILLARY BLOOD SPEC: CPT

## 2023-03-13 ENCOUNTER — NON-APPOINTMENT (OUTPATIENT)
Age: 63
End: 2023-03-13

## 2023-03-13 ENCOUNTER — APPOINTMENT (OUTPATIENT)
Dept: INTERNAL MEDICINE | Facility: CLINIC | Age: 63
End: 2023-03-13

## 2023-03-14 ENCOUNTER — APPOINTMENT (OUTPATIENT)
Dept: RADIOLOGY | Facility: HOSPITAL | Age: 63
End: 2023-03-14
Payer: COMMERCIAL

## 2023-03-14 ENCOUNTER — OUTPATIENT (OUTPATIENT)
Dept: OUTPATIENT SERVICES | Facility: HOSPITAL | Age: 63
LOS: 1 days | End: 2023-03-14

## 2023-03-14 DIAGNOSIS — R13.10 DYSPHAGIA, UNSPECIFIED: ICD-10-CM

## 2023-03-14 PROCEDURE — 74220 X-RAY XM ESOPHAGUS 1CNTRST: CPT | Mod: 26

## 2023-03-21 ENCOUNTER — NON-APPOINTMENT (OUTPATIENT)
Age: 63
End: 2023-03-21

## 2023-03-21 ENCOUNTER — APPOINTMENT (OUTPATIENT)
Dept: INTERNAL MEDICINE | Facility: CLINIC | Age: 63
End: 2023-03-21
Payer: COMMERCIAL

## 2023-03-21 VITALS
HEART RATE: 88 BPM | TEMPERATURE: 98.2 F | BODY MASS INDEX: 33.13 KG/M2 | SYSTOLIC BLOOD PRESSURE: 154 MMHG | OXYGEN SATURATION: 98 % | DIASTOLIC BLOOD PRESSURE: 90 MMHG | WEIGHT: 180 LBS | HEIGHT: 62 IN

## 2023-03-21 DIAGNOSIS — R92.2 INCONCLUSIVE MAMMOGRAM: ICD-10-CM

## 2023-03-21 PROCEDURE — 85610 PROTHROMBIN TIME: CPT | Mod: QW

## 2023-03-21 PROCEDURE — 99214 OFFICE O/P EST MOD 30 MIN: CPT | Mod: 25

## 2023-03-21 PROCEDURE — 36416 COLLJ CAPILLARY BLOOD SPEC: CPT

## 2023-03-22 ENCOUNTER — APPOINTMENT (OUTPATIENT)
Dept: GASTROENTEROLOGY | Facility: CLINIC | Age: 63
End: 2023-03-22
Payer: COMMERCIAL

## 2023-03-22 VITALS
SYSTOLIC BLOOD PRESSURE: 130 MMHG | WEIGHT: 150 LBS | HEART RATE: 100 BPM | OXYGEN SATURATION: 94 % | BODY MASS INDEX: 27.6 KG/M2 | TEMPERATURE: 97.3 F | DIASTOLIC BLOOD PRESSURE: 83 MMHG | HEIGHT: 62 IN

## 2023-03-22 LAB
INR PPP: 2.83
PT BLD: 33.7

## 2023-03-22 PROCEDURE — 99213 OFFICE O/P EST LOW 20 MIN: CPT

## 2023-03-23 ENCOUNTER — APPOINTMENT (OUTPATIENT)
Dept: MRI IMAGING | Facility: IMAGING CENTER | Age: 63
End: 2023-03-23

## 2023-03-29 ENCOUNTER — NON-APPOINTMENT (OUTPATIENT)
Age: 63
End: 2023-03-29

## 2023-03-29 ENCOUNTER — APPOINTMENT (OUTPATIENT)
Dept: INTERNAL MEDICINE | Facility: CLINIC | Age: 63
End: 2023-03-29
Payer: COMMERCIAL

## 2023-03-29 ENCOUNTER — APPOINTMENT (OUTPATIENT)
Dept: RHEUMATOLOGY | Facility: CLINIC | Age: 63
End: 2023-03-29
Payer: COMMERCIAL

## 2023-03-29 VITALS
WEIGHT: 157 LBS | DIASTOLIC BLOOD PRESSURE: 88 MMHG | RESPIRATION RATE: 16 BRPM | BODY MASS INDEX: 27.82 KG/M2 | OXYGEN SATURATION: 96 % | HEART RATE: 79 BPM | HEIGHT: 63 IN | SYSTOLIC BLOOD PRESSURE: 156 MMHG | TEMPERATURE: 98.3 F

## 2023-03-29 DIAGNOSIS — Z87.898 PERSONAL HISTORY OF OTHER SPECIFIED CONDITIONS: ICD-10-CM

## 2023-03-29 PROCEDURE — 99204 OFFICE O/P NEW MOD 45 MIN: CPT

## 2023-03-29 PROCEDURE — 85610 PROTHROMBIN TIME: CPT | Mod: QW

## 2023-03-29 NOTE — HISTORY OF PRESENT ILLNESS
[FreeTextEntry1] : Here for follow up with her sister. \par States she is now able to eat soft foods without any issues. \par Does not feel that foods are getting stuck as before. \par Denies heartburn, dysphagia, odynophagia, n/v. \par \par Pending lymph node biopsies. \par

## 2023-03-29 NOTE — ASSESSMENT
[FreeTextEntry1] : 62F afib on coumadin, asthma, CHF, mixed restrictive and obstructive lung disease, pulmonary htn, recent hospital admission for acute low back pain, weakness, supratheraptueic INR, imaging w breast nodules + lymphadenopathy. Following now for management of episodic dysphagia to solids. No prior egd or colonoscopy. No fhx of GI malignancy. \par \par - now s/p xray esophagram which shows esophageal reflux, normal contour and motility of esophagus with liquid barium suspension, no evidence of GE junction constriction, barium tablet "impacted" at GE junction and dissolved without moving into the stomach. \par - patient is hesitant to undergo EGD given co-morbidities and a/c \par - if endoscopy is pursued will need cards clearance prior to procedure\par - soft diet for now \par - will discuss case with esophageal specialist in group \par - await results of lymph node biopsies \par \par RTC after lymph node biopsies

## 2023-03-31 PROBLEM — R92.2 DENSE BREAST TISSUE ON MAMMOGRAM: Status: ACTIVE | Noted: 2023-02-21

## 2023-03-31 NOTE — REVIEW OF SYSTEMS
[Back Pain] : back pain [Fever] : no fever [Chills] : no chills [Chest Pain] : no chest pain [Palpitations] : no palpitations [Shortness Of Breath] : no shortness of breath

## 2023-03-31 NOTE — HISTORY OF PRESENT ILLNESS
[Family Member] : family member [FreeTextEntry1] : follow up [de-identified] : Patient presents with her sister for follow up visit.  Patient reporting that the MRI was not authorized that she was given a referral for last visit.  Patient wondering what is next step.  Patient did not receive mammogram referral yet.\par \par Patient reporting taking medications as prescribed.  Patient reporting going to all her visits as scheduled. Patient is doing okay for the most part.  Continues to have back pains.

## 2023-03-31 NOTE — PHYSICAL EXAM
[No Acute Distress] : no acute distress [Normal Voice/Communication] : normal voice/communication [No Respiratory Distress] : no respiratory distress  [de-identified] : Repeat blood pressure elevated as well during visit.

## 2023-04-01 NOTE — HISTORY OF PRESENT ILLNESS
[FreeTextEntry1] : Yung Salvador is coming for rheumatologic evaluation of multiple complaints...\par She is accompanied by her sister Lizz. Pt is a limited historian and history is obtained both from pt and sister,\par \par \par Since 2/2023 with hyperpigmentaion of ace and hands. She was seen by a dermatologist and hyperpigmentation was attributed to hydroquinone cream. It is getting better with topical treatments now.\par She has no h/o skin thickening, tightening, telangiectasias, no Raynauds\par She has h/o back pain, now resolved with PT.\par She also has GERD and dysphagia since beg of 2023, seen by GI, esophagogram showing stasis. Pt has lost about 10-15lb in the past 6 months\par She has also been found to have a supraclavicular LN on chest CT and is pending LN biopsy.\par She has also a h/o pulmonary hypertension, asthma, grade 3 diastolic dysfunction\par \par ROS\par no rash, photosensitivity, hair loss, oral or nasal ulcers, dry eyes or mouth, joint pain or swelling, fatigue, muscle weakness, fevers, chills, weight loss, Raynauds, numbness, tingling respiratory or gastrointestinal complaints\par \par Occupation:  \par Obstetric: 2 children, no miscarriages\par FH: cousin SLE\par

## 2023-04-01 NOTE — ASSESSMENT
[FreeTextEntry1] : 62F with dysphagia weight loss referred for a rheumatologic evaluation. Hyperpigmentation of hands and face unlikely to be secondary to a rheumatologic process such as dermatomyositis or scleroderma.\par Concern for malignancy given supraclavicular LN and dysphagia.\par \par \par Plan:\par -can check rheumatologic serologies \par -rtc as needed

## 2023-04-01 NOTE — PHYSICAL EXAM
[General Appearance - Alert] : alert [General Appearance - In No Acute Distress] : in no acute distress [General Appearance - Well Nourished] : well nourished [Sclera] : the sclera and conjunctiva were normal [Nasal Cavity] : the nasal mucosa and septum were normal [Neck Cervical Mass (___cm)] : no neck mass was observed [Respiration, Rhythm And Depth] : normal respiratory rhythm and effort [Auscultation Breath Sounds / Voice Sounds] : lungs were clear to auscultation bilaterally [Heart Sounds] : normal S1 and S2 [Heart Sounds Gallop] : no gallops [Murmurs] : no murmurs [Cervical Lymph Nodes Enlarged Posterior Bilaterally] : posterior cervical [Cervical Lymph Nodes Enlarged Anterior Bilaterally] : anterior cervical [Supraclavicular Lymph Nodes Enlarged Bilaterally] : supraclavicular [Musculoskeletal - Swelling] : no joint swelling seen [] : no rash [FreeTextEntry1] : hyperpigmented patches on hands [Sensation] : the sensory exam was normal to light touch and pinprick [Motor Exam] : the motor exam was normal [Oriented To Time, Place, And Person] : oriented to person, place, and time

## 2023-04-04 ENCOUNTER — OUTPATIENT (OUTPATIENT)
Dept: OUTPATIENT SERVICES | Facility: HOSPITAL | Age: 63
LOS: 1 days | End: 2023-04-04
Payer: COMMERCIAL

## 2023-04-04 ENCOUNTER — APPOINTMENT (OUTPATIENT)
Dept: ULTRASOUND IMAGING | Facility: CLINIC | Age: 63
End: 2023-04-04
Payer: COMMERCIAL

## 2023-04-04 DIAGNOSIS — E04.1 NONTOXIC SINGLE THYROID NODULE: ICD-10-CM

## 2023-04-04 PROCEDURE — 76536 US EXAM OF HEAD AND NECK: CPT

## 2023-04-04 PROCEDURE — 76536 US EXAM OF HEAD AND NECK: CPT | Mod: 26

## 2023-04-10 LAB
ANA SER IF-ACNC: NEGATIVE
APPEARANCE: CLEAR
BACTERIA: NEGATIVE
BILIRUBIN URINE: NEGATIVE
BLOOD URINE: NEGATIVE
CENTROMERE IGG SER-ACNC: <0.2 CD:130001892
CK SERPL-CCNC: 204 U/L
COLOR: NORMAL
CREAT SPEC-SCNC: 43 MG/DL
CREAT/PROT UR: 0.1 RATIO
DSDNA AB SER-ACNC: 54 IU/ML
ENA JO1 AB SER IA-ACNC: <0.2 AL
ENA RNP AB SER IA-ACNC: 0.4 AL
ENA SCL70 IGG SER IA-ACNC: <0.2 AL
ENA SM AB SER IA-ACNC: <0.2 AL
ENA SS-A AB SER IA-ACNC: <0.2 AL
ENA SS-B AB SER IA-ACNC: <0.2 AL
GLUCOSE QUALITATIVE U: NEGATIVE
HYALINE CASTS: 0 /LPF
KETONES URINE: NEGATIVE
LEUKOCYTE ESTERASE URINE: NEGATIVE
MICROSCOPIC-UA: NORMAL
NITRITE URINE: NEGATIVE
PH URINE: 6.5
PROT UR-MCNC: 5 MG/DL
PROTEIN URINE: NEGATIVE
RED BLOOD CELLS URINE: 1 /HPF
RNA POLYMERASE III IGG: 6 UNITS
SPECIFIC GRAVITY URINE: 1.01
SQUAMOUS EPITHELIAL CELLS: 1 /HPF
UROBILINOGEN URINE: NORMAL
WHITE BLOOD CELLS URINE: 1 /HPF

## 2023-04-10 NOTE — PHYSICAL EXAM
[Normal] : no respiratory distress, lungs were clear to auscultation bilaterally and no accessory muscle use
Abdominal Pain, N/V/D

## 2023-04-11 ENCOUNTER — NON-APPOINTMENT (OUTPATIENT)
Age: 63
End: 2023-04-11

## 2023-04-12 ENCOUNTER — APPOINTMENT (OUTPATIENT)
Dept: INTERNAL MEDICINE | Facility: CLINIC | Age: 63
End: 2023-04-12
Payer: COMMERCIAL

## 2023-04-12 ENCOUNTER — NON-APPOINTMENT (OUTPATIENT)
Age: 63
End: 2023-04-12

## 2023-04-12 ENCOUNTER — APPOINTMENT (OUTPATIENT)
Dept: DERMATOLOGY | Facility: CLINIC | Age: 63
End: 2023-04-12

## 2023-04-12 PROCEDURE — 93793 ANTICOAG MGMT PT WARFARIN: CPT

## 2023-04-12 PROCEDURE — 36416 COLLJ CAPILLARY BLOOD SPEC: CPT

## 2023-04-12 PROCEDURE — 85610 PROTHROMBIN TIME: CPT | Mod: QW

## 2023-04-13 ENCOUNTER — TRANSCRIPTION ENCOUNTER (OUTPATIENT)
Age: 63
End: 2023-04-13

## 2023-04-13 ENCOUNTER — NON-APPOINTMENT (OUTPATIENT)
Age: 63
End: 2023-04-13

## 2023-04-17 ENCOUNTER — NON-APPOINTMENT (OUTPATIENT)
Age: 63
End: 2023-04-17

## 2023-04-17 LAB
EJ AB SER QL: NEGATIVE
ENA JO1 AB SER IA-ACNC: <20 UNITS
ENA PM/SCL AB SER-ACNC: <20 UNITS
ENA SM+RNP AB SER IA-ACNC: <20 UNITS
ENA SS-A IGG SER QL: <20 UNITS
FIBRILLARIN AB SER QL: NEGATIVE
KU AB SER QL: NEGATIVE
MDA-5 (P140)(CADM-140): <20 UNITS
MI2 AB SER QL: NEGATIVE
NXP-2 (P140): <20 UNITS
OJ AB SER QL: NEGATIVE
PL12 AB SER QL: NEGATIVE
PL7 AB SER QL: NEGATIVE
SRP AB SERPL QL: NEGATIVE
TIF GAMMA (P155/140): <20 UNITS
U2 SNRNP AB SER QL: NEGATIVE

## 2023-04-18 ENCOUNTER — NON-APPOINTMENT (OUTPATIENT)
Age: 63
End: 2023-04-18

## 2023-04-18 ENCOUNTER — APPOINTMENT (OUTPATIENT)
Dept: INTERNAL MEDICINE | Facility: CLINIC | Age: 63
End: 2023-04-18

## 2023-04-18 RX ORDER — FLUTICASONE FUROATE, UMECLIDINIUM BROMIDE AND VILANTEROL TRIFENATATE 100; 62.5; 25 UG/1; UG/1; UG/1
100-62.5-25 POWDER RESPIRATORY (INHALATION)
Qty: 1 | Refills: 3 | Status: DISCONTINUED | COMMUNITY
Start: 2022-09-19 | End: 2023-04-18

## 2023-04-20 ENCOUNTER — APPOINTMENT (OUTPATIENT)
Dept: DERMATOLOGY | Facility: CLINIC | Age: 63
End: 2023-04-20
Payer: COMMERCIAL

## 2023-04-20 ENCOUNTER — APPOINTMENT (OUTPATIENT)
Dept: CARDIOLOGY | Facility: CLINIC | Age: 63
End: 2023-04-20

## 2023-04-20 VITALS — HEIGHT: 63 IN | WEIGHT: 157 LBS | BODY MASS INDEX: 27.82 KG/M2

## 2023-04-20 DIAGNOSIS — L81.4 OTHER MELANIN HYPERPIGMENTATION: ICD-10-CM

## 2023-04-20 PROCEDURE — 99213 OFFICE O/P EST LOW 20 MIN: CPT

## 2023-04-21 NOTE — HISTORY OF PRESENT ILLNESS
[FreeTextEntry1] : followup [de-identified] : here for followup of discoloration - only using amlactin for her hands without improvement \par Did not obtain skin lightening cream from [facility] due to cost - wants to try topical that may be covered by insurance

## 2023-04-21 NOTE — ASSESSMENT
[FreeTextEntry1] : Periocular hyperpigmentation - favor possible exogenous ochronosis in the setting of hydroquinone cream\par New dx, unclear clinical course\par Will try topical azaleic acid QAM\par tretinoin 0.025% QPM - Advised on the potential side effects of topical retinoids including redness, irritation and peeling of the skin, and how using an oil-free moisturizer after application or escalating up the dosing frequency to nightly (i.e. starting with every other night) may help mitigate these side effects. Also explained that a treatment duration of 2-3 months is typically required to achieve significant results.\par Will see if above would be covered by insurance but if not, will recommend [facility] azaleic acid without hydorquinone compounded cream\par \par Hyperpigmentation, dorsal hands\par Also with component of lichenification/xerosis\par - amlactin cream BID to treat xerosis/lichenification \par - trial of azaleic acid  \par \par \par

## 2023-05-02 ENCOUNTER — APPOINTMENT (OUTPATIENT)
Dept: INTERNAL MEDICINE | Facility: CLINIC | Age: 63
End: 2023-05-02

## 2023-05-03 ENCOUNTER — NON-APPOINTMENT (OUTPATIENT)
Age: 63
End: 2023-05-03

## 2023-05-03 ENCOUNTER — APPOINTMENT (OUTPATIENT)
Dept: CARDIOLOGY | Facility: CLINIC | Age: 63
End: 2023-05-03
Payer: COMMERCIAL

## 2023-05-03 VITALS
DIASTOLIC BLOOD PRESSURE: 67 MMHG | WEIGHT: 150 LBS | BODY MASS INDEX: 26.58 KG/M2 | HEART RATE: 83 BPM | SYSTOLIC BLOOD PRESSURE: 118 MMHG | HEIGHT: 63 IN | OXYGEN SATURATION: 99 %

## 2023-05-03 PROCEDURE — 99214 OFFICE O/P EST MOD 30 MIN: CPT | Mod: 25

## 2023-05-03 PROCEDURE — 93000 ELECTROCARDIOGRAM COMPLETE: CPT

## 2023-05-03 NOTE — DISCUSSION/SUMMARY
[FreeTextEntry1] : Mrs. Salvador continues in atrial fibrillation.  She remains asymptomatic.  Her exam shows no change in her weight, normal blood pressure, and irregular rhythm at a rate of about 70, clear lungs, and a normal cardiac exam.  Her EKG shows atrial fibrillation and is unchanged.\par \par She is stable and is an acceptable candidate for endoscopy.  She is cleared to proceed.  She will discontinue Coumadin 4 days prior to the procedure. [EKG obtained to assist in diagnosis and management of assessed problem(s)] : EKG obtained to assist in diagnosis and management of assessed problem(s)

## 2023-05-03 NOTE — HISTORY OF PRESENT ILLNESS
[FreeTextEntry1] : 62-year-old female with a history of chronic atrial fibrillation returns for follow.  She is being scheduled for endoscopy and biopsy and requires clearance.  She was last seen in February.  She has no palpitations or other cardiac symptoms and is feeling generally well.  She has had problems with her swallowing, but reports it is improved at present.  She had several questionable syncopal episodes over the winter, but has not had any recurrence since February.

## 2023-05-17 ENCOUNTER — APPOINTMENT (OUTPATIENT)
Dept: INTERNAL MEDICINE | Facility: CLINIC | Age: 63
End: 2023-05-17

## 2023-05-22 NOTE — DISCHARGE NOTE NURSING/CASE MANAGEMENT/SOCIAL WORK - NSDCPEPTSTROKESIGNS_GEN_ALL_CORE
Sudden numbness or weakness of the face, arm, or leg, especially on one side of the body. Confusion, trouble speaking or understanding. Trouble seeing in one or both eyes. Trouble walking, dizziness, loss of balance or coordination. Severe headache. Writer met with pt to provide peer support.  Pt is interested in 28-day treatment for AUD.    CD team can be reached at

## 2023-05-25 PROBLEM — L81.4 DARKENING OF SKIN: Status: ACTIVE | Noted: 2023-01-06

## 2023-05-30 ENCOUNTER — APPOINTMENT (OUTPATIENT)
Dept: INTERNAL MEDICINE | Facility: CLINIC | Age: 63
End: 2023-05-30
Payer: COMMERCIAL

## 2023-05-30 ENCOUNTER — RESULT CHARGE (OUTPATIENT)
Age: 63
End: 2023-05-30

## 2023-05-30 ENCOUNTER — NON-APPOINTMENT (OUTPATIENT)
Age: 63
End: 2023-05-30

## 2023-05-30 VITALS
DIASTOLIC BLOOD PRESSURE: 91 MMHG | WEIGHT: 147 LBS | HEIGHT: 63 IN | TEMPERATURE: 98.2 F | OXYGEN SATURATION: 98 % | SYSTOLIC BLOOD PRESSURE: 137 MMHG | BODY MASS INDEX: 26.05 KG/M2 | HEART RATE: 62 BPM

## 2023-05-30 DIAGNOSIS — Z01.810 ENCOUNTER FOR PREPROCEDURAL CARDIOVASCULAR EXAMINATION: ICD-10-CM

## 2023-05-30 DIAGNOSIS — R59.0 LOCALIZED ENLARGED LYMPH NODES: ICD-10-CM

## 2023-05-30 PROCEDURE — 85610 PROTHROMBIN TIME: CPT | Mod: QW

## 2023-05-30 PROCEDURE — 36416 COLLJ CAPILLARY BLOOD SPEC: CPT

## 2023-05-30 PROCEDURE — 99214 OFFICE O/P EST MOD 30 MIN: CPT | Mod: 25

## 2023-05-30 RX ORDER — METOPROLOL SUCCINATE 25 MG/1
25 TABLET, EXTENDED RELEASE ORAL
Qty: 30 | Refills: 0 | Status: COMPLETED | COMMUNITY
Start: 2023-05-04

## 2023-05-30 NOTE — REVIEW OF SYSTEMS
[Fever] : no fever [Chills] : no chills [Recent Change In Weight] : ~T no recent weight change [Abdominal Pain] : no abdominal pain [Nausea] : no nausea [Vomiting] : no vomiting

## 2023-05-30 NOTE — ASSESSMENT
[FreeTextEntry1] : Optimized for her EGD, advised no warfarin for 4 days prior to procedure.\par \par Patient mainly concerned as to why she can't swallow, reassured by her Cardiology visit.\par \par No active breathing issues.\par \par Patient can undergo EGD for further investigation.\par \par Referred to interventional radiology for biopsy of supraclavicular lymph node.

## 2023-05-30 NOTE — HISTORY OF PRESENT ILLNESS
[Family Member] : family member [FreeTextEntry1] : follow up difficulty swallowing [de-identified] : Doing well for swallowing, not yet scheduled for EGD.  Reports told by GI to see Cardiology and Pulmonary. \par \par Breathing no issues. \par \par Swallowing is major issue for most part, can't swallow fruit it gets stuck.  \par \par Otherwise taking all medications as prescribed.  Concerned about getting an EGD but feels more reassured since seeing Cardiology as she wants to understand why the problem swallowing.\par \par No weight loss she can't explain.

## 2023-05-31 ENCOUNTER — NON-APPOINTMENT (OUTPATIENT)
Age: 63
End: 2023-05-31

## 2023-06-01 ENCOUNTER — NON-APPOINTMENT (OUTPATIENT)
Age: 63
End: 2023-06-01

## 2023-06-08 ENCOUNTER — NON-APPOINTMENT (OUTPATIENT)
Age: 63
End: 2023-06-08

## 2023-06-12 ENCOUNTER — NON-APPOINTMENT (OUTPATIENT)
Age: 63
End: 2023-06-12

## 2023-06-14 ENCOUNTER — RESULT REVIEW (OUTPATIENT)
Age: 63
End: 2023-06-14

## 2023-06-14 ENCOUNTER — OUTPATIENT (OUTPATIENT)
Dept: OUTPATIENT SERVICES | Facility: HOSPITAL | Age: 63
LOS: 1 days | End: 2023-06-14
Payer: COMMERCIAL

## 2023-06-14 ENCOUNTER — APPOINTMENT (OUTPATIENT)
Dept: ULTRASOUND IMAGING | Facility: IMAGING CENTER | Age: 63
End: 2023-06-14
Payer: COMMERCIAL

## 2023-06-14 DIAGNOSIS — Z00.8 ENCOUNTER FOR OTHER GENERAL EXAMINATION: ICD-10-CM

## 2023-06-14 PROCEDURE — 10005 FNA BX W/US GDN 1ST LES: CPT

## 2023-06-14 PROCEDURE — 88173 CYTOPATH EVAL FNA REPORT: CPT

## 2023-06-14 PROCEDURE — 87205 SMEAR GRAM STAIN: CPT

## 2023-06-14 PROCEDURE — 88184 FLOWCYTOMETRY/ TC 1 MARKER: CPT

## 2023-06-14 PROCEDURE — 88305 TISSUE EXAM BY PATHOLOGIST: CPT

## 2023-06-14 PROCEDURE — 88173 CYTOPATH EVAL FNA REPORT: CPT | Mod: 26

## 2023-06-14 PROCEDURE — 88185 FLOWCYTOMETRY/TC ADD-ON: CPT

## 2023-06-14 PROCEDURE — 88172 CYTP DX EVAL FNA 1ST EA SITE: CPT

## 2023-06-14 PROCEDURE — 88305 TISSUE EXAM BY PATHOLOGIST: CPT | Mod: 26

## 2023-06-15 LAB — TM INTERPRETATION: SIGNIFICANT CHANGE UP

## 2023-06-16 ENCOUNTER — APPOINTMENT (OUTPATIENT)
Dept: INTERNAL MEDICINE | Facility: CLINIC | Age: 63
End: 2023-06-16

## 2023-06-16 LAB — NON-GYNECOLOGICAL CYTOLOGY STUDY: SIGNIFICANT CHANGE UP

## 2023-06-27 ENCOUNTER — APPOINTMENT (OUTPATIENT)
Dept: PULMONOLOGY | Facility: CLINIC | Age: 63
End: 2023-06-27
Payer: COMMERCIAL

## 2023-06-27 VITALS
HEIGHT: 60 IN | DIASTOLIC BLOOD PRESSURE: 80 MMHG | TEMPERATURE: 97 F | SYSTOLIC BLOOD PRESSURE: 140 MMHG | OXYGEN SATURATION: 99 % | RESPIRATION RATE: 16 BRPM | HEART RATE: 89 BPM | WEIGHT: 149 LBS | BODY MASS INDEX: 29.25 KG/M2

## 2023-06-27 PROCEDURE — 94010 BREATHING CAPACITY TEST: CPT

## 2023-06-27 PROCEDURE — 94729 DIFFUSING CAPACITY: CPT

## 2023-06-27 PROCEDURE — 94727 GAS DIL/WSHOT DETER LNG VOL: CPT

## 2023-06-27 PROCEDURE — ZZZZZ: CPT

## 2023-06-27 PROCEDURE — 99214 OFFICE O/P EST MOD 30 MIN: CPT | Mod: 25

## 2023-06-27 PROCEDURE — 95012 NITRIC OXIDE EXP GAS DETER: CPT

## 2023-06-27 NOTE — PHYSICAL EXAM
[No Acute Distress] : no acute distress [Normal Oropharynx] : normal oropharynx [III] : Mallampati Class: III [Normal Appearance] : normal appearance [Normal Rate/Rhythm] : normal rate/rhythm [No Neck Mass] : no neck mass [Normal S1, S2] : normal s1, s2 [No Murmurs] : no murmurs [No Resp Distress] : no resp distress [Clear to Auscultation Bilaterally] : clear to auscultation bilaterally [No Abnormalities] : no abnormalities [Benign] : benign [Normal Gait] : normal gait [No Clubbing] : no clubbing [No Cyanosis] : no cyanosis [No Edema] : no edema [FROM] : FROM [Normal Color/ Pigmentation] : normal color/ pigmentation [No Focal Deficits] : no focal deficits [Oriented x3] : oriented x3 [Normal Affect] : normal affect [TextBox_2] : ow  [TextBox_68] : I:E ratio 1:3; clear

## 2023-06-27 NOTE — PROCEDURE
[FreeTextEntry1] : \par Sleep study (dec.23.2022) revealed sleep apnea with an AHI/DARON of  1.1, snore index of --% and a low oxygen saturation of 94% - no sleep apnea  \par \par FULL PFTs reveals normal flows; FEV1 was  1.88L which is 82% of predicted; normal lung volumes; normal diffusion of 17, which is 86% of predicted; normal flow volume loop;  PFT's were performed for the evaluation of SOB \par \par FENO was 30 ; normal value being less than 25\par Fractional exhaled nitric oxide (FENO) is regarded as a simple, noninvasive method for assessing eosinophilic airway inflammation. Produced by a variety of cells within the lung, nitric oxide (NO) concentrations are generally low in healthy individuals. However, high concentrations of NO appear to be involved in nonspecific host defense mechanisms and chronic inflammatory diseases such as asthma. The American Thoracic Society (ATS) therefore has recommended using FENO to aid in the diagnosis and monitoring of eosinophilic airway inflammation and asthma, and for identifying steroid responsive individuals whose chronic respiratory symptoms may be airway inflammation.

## 2023-06-27 NOTE — ASSESSMENT
[FreeTextEntry1] : Ms. CHAMPION is a 62 year old female with a history of a fib, grade 3 distolic dysfunction, positive messi, asthma, and mild PAH presenting to the office today for follow up pulmonary evaluation. Her chief complaint is sob, mild persistent asthma, + allergies, no JULIANNA- +PAH\par \par Her shortness of breath is multifactorial due to:\par -poor mechanics of breathing \par -out of shape / overweight\par -pulmonary disease\par    -Mild restrictive dysfunction- WNL Diffusion \par -cardiac disease \par \par Problem 1: Mild persistent Asthma\par - add Albuterol 0.83% via nebulizer, Q6H \par -add Trelegy 100 1 puff QD \par Asthma is  believed to be caused by inherited (genetic) and environmental factor, but its exact cause is unknown. Asthma may be triggered by allergens, lung infections, or irritants in the air. Asthma triggers are different for each person \par Inhaler technique reviewed as well as oral hygiene techniques reviewed with patient. Avoidance of cold air, extremes of temperature, rescue inhaler should be used before exercise. Order of medication reviewed with patient. Recommended use of a cool mist humidifier in the bedroom. \par \par Problem 1A: Eosinophilic Asthma \par - The safety and efficacy of Nucala was established in three double-blind, randomized, placebo-controlled trials in patients with severe asthma. Compared to a placebo, patients with severe asthma receiving Nucala had fewer exacerbation requiring hospitalization and/or emergency department visits, and a longer time to first exacerbation. In addition, patients with severe asthma receiving Nucala or Fasenra experienced greater reductions in their daily maintenance oral corticosteroid dose, while maintaining asthma control compared with patients receiving placebo. Treatment with Nucala did not result in a significant improvement in lung function, as measured by the volume of air exhaled by patients in one second. The most common side effects include: headache, injection site reactions, back pain, weakness, and fatigue; hypersensitivity reactions can occur within hours or days including swelling of the face, mouth, and tongue, fainting, dizziness, hives, breathing problems, and rash; herpes zoster infections have occurred. The drug is a monoclonal antibody that inhibits interleukin-5 which helps regular eosinophils, a type of white blood cell that contributes to asthma. The over-production of eosinophils can cause inflammation in the lungs, increasing the frequency of asthma attacks. Patients must also take other medications, including high dose inhaled corticosteroids and at least one additional asthma drug. \par \par Prble 1B: Elevated IgE 174\par - Xolair candidate if needed \par -Xolair is a recombinant DNA- derived humanized IgG1K monoclonal antibody that selectively binds ot human immunoglobulin E (IgE). Xolair is produced by a Chinese hamster ovary cell suspension culture in nutrient medium containing the antibiotic gentamicin. Gentamicin is not detectable in the final product. Xolair is a sterile, white, preservative free, lyophilized powder contained in a single use vial that is reconstituted with sterile water for suspension. Side effects include: wheezing, tightness of the chest, trouble breathing, hives, skin rash, feeling anxious or light-headed, fainting, warmth or tingling under skin, or swelling of face, lips, or tongue \par \par Problem 2: + Allergies \par - a/p given for blood work: + asthma profile, + food IgE panel, eosinophil level, IgE level 174, Vitamin D level Low\par Environmental measures for allergies were encouraged including mattress and pillow covers, air purifier, and environmental controls. \par \par Problem 2A: Low Vitamin D\par - Suplement \par - Has been associated with asthma exacerbations and increased allergic symptoms. The goal based on recent information is maintaining levels between 50-70 and low normal is 30. Recommended 50,000 units every two weeks to once a month depending on the level.\par \par Problem 3: NO JULIANNA (mallampati class, A fib,)\par -s/p home sleep study - not present \par Sleep apnea is associated with adverse clinical consequences which can affect most organ systems.  Cardiovascular disease risk includes arrhythmias, atrial fibrillation, hypertension, coronary artery disease, and stroke. Metabolic disorders include diabetes type 2, non-alcoholic fatty liver disease. Mood disorder especially depression; and cognitive decline especially in the elderly. Associations with  chronic reflux/Wilson’s esophagus some but not all inclusive. \par -Reasons  include arousal consistent with hypopnea; respiratory events most prominent in REM sleep or supine position; therefore sleep staging and body position are important for accurate diagnosis and estimation of AHI. \par \par Problem 4: GERD/LPR (Dr. Ceballos)\par -endoscopy and manometry pending - intervention \par -Rule of 2s: avoid eating too much, eating too late, eating too spicy, eating two hours before bed.\par - Things to avoid including overeating, spicy foods, tight clothing, eating within two hours of bed, this list is not all inclusive.\par - For treatments of reflux, possible options discussed including diet control, H2 blockers, PPIs, as well as coating motility agents discussed as treatment options. Timing of meals and proximity of last meal to sleep were discussed. If symptoms persist, a formal gastrointestinal evaluation is needed.\par \par \par problem 5: cardiac disease\par -recommended to continue to follow up with Cardiologist (Dr. Mejia)- ECHO 11/2023\par \par problem 5A: + PAH\par - s/p ECHO - next ECHO = 11/2023\par - RHC after if needed Vq scan \par \par problem 6: poor breathing mechanics\par -Proper breathing techniques were reviewed with an emphasis of exhalation. Patient instructed to breath in for 1 second and out for four seconds. Patient was encouraged to not talk while walking. \par \par problem 7: Rule out vascular abnormality \par - Get Doppler \par \par problem 8: out of shape / overweight\par -Weight loss, exercise, and diet control were discussed and are highly encouraged. Treatment options were given such as, aqua therapy, and contacting a nutritionist. Recommended to use the elliptical, stationary bike, less use of treadmill. Mindful eating was explained to the patient Obesity is associated with worsening asthma, shortness of breath, and potential for cardiac disease, diabetes, and other underlying medical conditions\par \par problem 9: health maintenance \par -recommended yearly flu shot after October 15 2022 \par -recommended strep pneumonia vaccines: Prevnar-13 vaccine, followed by Pneumo vaccine 23 one year following after 65\par -recommended early intervention for Upper Respiratory Infections (URIs)\par -recommended regular osteoporosis evaluations\par -recommended early dermatological evaluations\par -recommended after the age of 50 to the age of 70, colonoscopy every 5 years\par \par F/U in 6-8 weeks.\par She is encouraged to call with any changes, concerns, or questions

## 2023-06-27 NOTE — ADDENDUM
[FreeTextEntry1] : Documented by Kylah Elise acting as a scribe for Dr. Seth Curtis on 06/27/2023 \par \par All medical record entries made by the Scribe were at my, Dr. Seth Curtis's, direction and personally dictated by me on 06/27/2023 . I have reviewed the chart and agree that the record accurately reflects my personal performance of the history, physical exam, assessment and plan. I have also personally directed, reviewed, and agree with the discharge instructions.

## 2023-06-27 NOTE — HISTORY OF PRESENT ILLNESS
[TextBox_4] : Ms. CHAMPION is a 62 year old female with a history of a fib, grade 3 diastolic dysfunction, positive messi, asthma presenting to the office today for follow up pulmonary evaluation. Her chief complaint is sob, mild persistent asthma, ?allergies, ?JULIANNA \par - she notes she has been feeling well\par - energy levels are fine \par - she has been having swallowing issues so she has been eating softened foods and soup\par - she notes her weight has been fluctuating\par - she notes her breathing is fine\par - she notes her sleep pattern is fine, she gets about 4-5 hrs of sleep a night and she feels its enough for her. \par - she notes she has been doing a lot of walking for exercise\par - she has been compliant with her respiratory medications \par -She denies any visual issues, headaches, nausea, vomiting, fever, chills, sweats, chest pains, chest pressure, diarrhea, constipation, dysphagia, myalgia, dizziness, leg swelling, leg pain, itchy eyes, itchy ears, heartburn, reflux, or sour taste in the mouth.

## 2023-06-30 ENCOUNTER — RESULT REVIEW (OUTPATIENT)
Age: 63
End: 2023-06-30

## 2023-06-30 ENCOUNTER — APPOINTMENT (OUTPATIENT)
Dept: MAMMOGRAPHY | Facility: CLINIC | Age: 63
End: 2023-06-30
Payer: COMMERCIAL

## 2023-06-30 ENCOUNTER — APPOINTMENT (OUTPATIENT)
Dept: ULTRASOUND IMAGING | Facility: CLINIC | Age: 63
End: 2023-06-30
Payer: COMMERCIAL

## 2023-06-30 PROCEDURE — 77062 BREAST TOMOSYNTHESIS BI: CPT

## 2023-06-30 PROCEDURE — 76642 ULTRASOUND BREAST LIMITED: CPT | Mod: RT

## 2023-06-30 PROCEDURE — 77066 DX MAMMO INCL CAD BI: CPT

## 2023-07-06 ENCOUNTER — RX RENEWAL (OUTPATIENT)
Age: 63
End: 2023-07-06

## 2023-07-06 ENCOUNTER — APPOINTMENT (OUTPATIENT)
Dept: RHEUMATOLOGY | Facility: CLINIC | Age: 63
End: 2023-07-06

## 2023-07-06 DIAGNOSIS — R92.8 OTHER ABNORMAL AND INCONCLUSIVE FINDINGS ON DIAGNOSTIC IMAGING OF BREAST: ICD-10-CM

## 2023-07-07 ENCOUNTER — APPOINTMENT (OUTPATIENT)
Dept: INTERNAL MEDICINE | Facility: CLINIC | Age: 63
End: 2023-07-07

## 2023-07-10 ENCOUNTER — NON-APPOINTMENT (OUTPATIENT)
Age: 63
End: 2023-07-10

## 2023-07-11 ENCOUNTER — APPOINTMENT (OUTPATIENT)
Dept: ULTRASOUND IMAGING | Facility: IMAGING CENTER | Age: 63
End: 2023-07-11
Payer: MEDICAID

## 2023-07-11 ENCOUNTER — NON-APPOINTMENT (OUTPATIENT)
Age: 63
End: 2023-07-11

## 2023-07-11 ENCOUNTER — OUTPATIENT (OUTPATIENT)
Dept: OUTPATIENT SERVICES | Facility: HOSPITAL | Age: 63
LOS: 1 days | End: 2023-07-11
Payer: MEDICAID

## 2023-07-11 DIAGNOSIS — Z00.8 ENCOUNTER FOR OTHER GENERAL EXAMINATION: ICD-10-CM

## 2023-07-11 DIAGNOSIS — R92.8 OTHER ABNORMAL AND INCONCLUSIVE FINDINGS ON DIAGNOSTIC IMAGING OF BREAST: ICD-10-CM

## 2023-07-11 PROCEDURE — 88305 TISSUE EXAM BY PATHOLOGIST: CPT | Mod: 26

## 2023-07-11 PROCEDURE — 88305 TISSUE EXAM BY PATHOLOGIST: CPT

## 2023-07-11 PROCEDURE — 19083 BX BREAST 1ST LESION US IMAG: CPT | Mod: RT

## 2023-07-11 PROCEDURE — 88313 SPECIAL STAINS GROUP 2: CPT | Mod: 26

## 2023-07-11 PROCEDURE — A4648: CPT

## 2023-07-11 PROCEDURE — 19083 BX BREAST 1ST LESION US IMAG: CPT

## 2023-07-11 PROCEDURE — 77065 DX MAMMO INCL CAD UNI: CPT

## 2023-07-11 PROCEDURE — 88312 SPECIAL STAINS GROUP 1: CPT

## 2023-07-11 PROCEDURE — 77065 DX MAMMO INCL CAD UNI: CPT | Mod: 26,RT

## 2023-07-11 PROCEDURE — 88313 SPECIAL STAINS GROUP 2: CPT

## 2023-07-11 PROCEDURE — 88312 SPECIAL STAINS GROUP 1: CPT | Mod: 26

## 2023-07-13 ENCOUNTER — NON-APPOINTMENT (OUTPATIENT)
Age: 63
End: 2023-07-13

## 2023-07-13 LAB
INR PPP: 2.8 RATIO
POCT-PROTHROMBIN TIME: 27.6 SECS

## 2023-07-20 LAB — SURGICAL PATHOLOGY STUDY: SIGNIFICANT CHANGE UP

## 2023-07-28 ENCOUNTER — OUTPATIENT (OUTPATIENT)
Dept: OUTPATIENT SERVICES | Facility: HOSPITAL | Age: 63
LOS: 1 days | End: 2023-07-28

## 2023-07-28 ENCOUNTER — APPOINTMENT (OUTPATIENT)
Dept: INTERNAL MEDICINE | Facility: CLINIC | Age: 63
End: 2023-07-28

## 2023-07-28 VITALS — HEART RATE: 53 BPM | OXYGEN SATURATION: 95 %

## 2023-07-28 LAB
INR PPP: 3.5 RATIO
POCT-PROTHROMBIN TIME: 41.5 SECS
QUALITY CONTROL: YES

## 2023-07-31 DIAGNOSIS — Z79.01 LONG TERM (CURRENT) USE OF ANTICOAGULANTS: ICD-10-CM

## 2023-08-02 ENCOUNTER — APPOINTMENT (OUTPATIENT)
Dept: INTERNAL MEDICINE | Facility: CLINIC | Age: 63
End: 2023-08-02

## 2023-08-03 ENCOUNTER — APPOINTMENT (OUTPATIENT)
Dept: INTERNAL MEDICINE | Facility: CLINIC | Age: 63
End: 2023-08-03

## 2023-08-03 ENCOUNTER — RESULT CHARGE (OUTPATIENT)
Age: 63
End: 2023-08-03

## 2023-08-03 ENCOUNTER — NON-APPOINTMENT (OUTPATIENT)
Age: 63
End: 2023-08-03

## 2023-08-03 ENCOUNTER — OUTPATIENT (OUTPATIENT)
Dept: OUTPATIENT SERVICES | Facility: HOSPITAL | Age: 63
LOS: 1 days | End: 2023-08-03

## 2023-08-03 VITALS — HEART RATE: 62 BPM | OXYGEN SATURATION: 99 %

## 2023-08-04 ENCOUNTER — OUTPATIENT (OUTPATIENT)
Dept: OUTPATIENT SERVICES | Facility: HOSPITAL | Age: 63
LOS: 1 days | End: 2023-08-04
Payer: MEDICAID

## 2023-08-04 VITALS
HEIGHT: 62.99 IN | RESPIRATION RATE: 18 BRPM | SYSTOLIC BLOOD PRESSURE: 130 MMHG | TEMPERATURE: 98 F | HEART RATE: 75 BPM | OXYGEN SATURATION: 97 % | DIASTOLIC BLOOD PRESSURE: 82 MMHG | WEIGHT: 147.93 LBS

## 2023-08-04 DIAGNOSIS — I48.91 UNSPECIFIED ATRIAL FIBRILLATION: ICD-10-CM

## 2023-08-04 DIAGNOSIS — R13.10 DYSPHAGIA, UNSPECIFIED: ICD-10-CM

## 2023-08-04 DIAGNOSIS — Z01.818 ENCOUNTER FOR OTHER PREPROCEDURAL EXAMINATION: ICD-10-CM

## 2023-08-04 DIAGNOSIS — R13.19 OTHER DYSPHAGIA: ICD-10-CM

## 2023-08-04 DIAGNOSIS — J45.909 UNSPECIFIED ASTHMA, UNCOMPLICATED: ICD-10-CM

## 2023-08-04 LAB
ANION GAP SERPL CALC-SCNC: 11 MMOL/L — SIGNIFICANT CHANGE UP (ref 5–17)
BUN SERPL-MCNC: 13 MG/DL — SIGNIFICANT CHANGE UP (ref 7–23)
CALCIUM SERPL-MCNC: 9.5 MG/DL — SIGNIFICANT CHANGE UP (ref 8.4–10.5)
CHLORIDE SERPL-SCNC: 104 MMOL/L — SIGNIFICANT CHANGE UP (ref 96–108)
CO2 SERPL-SCNC: 27 MMOL/L — SIGNIFICANT CHANGE UP (ref 22–31)
CREAT SERPL-MCNC: 0.73 MG/DL — SIGNIFICANT CHANGE UP (ref 0.5–1.3)
EGFR: 92 ML/MIN/1.73M2 — SIGNIFICANT CHANGE UP
GLUCOSE SERPL-MCNC: 90 MG/DL — SIGNIFICANT CHANGE UP (ref 70–99)
HCT VFR BLD CALC: 39.2 % — SIGNIFICANT CHANGE UP (ref 34.5–45)
HGB BLD-MCNC: 13 G/DL — SIGNIFICANT CHANGE UP (ref 11.5–15.5)
INR PPP: 2.3 RATIO
MCHC RBC-ENTMCNC: 27.7 PG — SIGNIFICANT CHANGE UP (ref 27–34)
MCHC RBC-ENTMCNC: 33.2 GM/DL — SIGNIFICANT CHANGE UP (ref 32–36)
MCV RBC AUTO: 83.6 FL — SIGNIFICANT CHANGE UP (ref 80–100)
NRBC # BLD: 0 /100 WBCS — SIGNIFICANT CHANGE UP (ref 0–0)
PLATELET # BLD AUTO: 219 K/UL — SIGNIFICANT CHANGE UP (ref 150–400)
POCT-PROTHROMBIN TIME: 27.5 SECS
POTASSIUM SERPL-MCNC: 3.3 MMOL/L — LOW (ref 3.5–5.3)
POTASSIUM SERPL-SCNC: 3.3 MMOL/L — LOW (ref 3.5–5.3)
RBC # BLD: 4.69 M/UL — SIGNIFICANT CHANGE UP (ref 3.8–5.2)
RBC # FLD: 14.2 % — SIGNIFICANT CHANGE UP (ref 10.3–14.5)
SODIUM SERPL-SCNC: 142 MMOL/L — SIGNIFICANT CHANGE UP (ref 135–145)
WBC # BLD: 4.98 K/UL — SIGNIFICANT CHANGE UP (ref 3.8–10.5)
WBC # FLD AUTO: 4.98 K/UL — SIGNIFICANT CHANGE UP (ref 3.8–10.5)

## 2023-08-04 PROCEDURE — 80048 BASIC METABOLIC PNL TOTAL CA: CPT

## 2023-08-04 PROCEDURE — 36415 COLL VENOUS BLD VENIPUNCTURE: CPT

## 2023-08-04 PROCEDURE — G0463: CPT

## 2023-08-04 PROCEDURE — 85027 COMPLETE CBC AUTOMATED: CPT

## 2023-08-04 NOTE — H&P PST ADULT - PROBLEM SELECTOR PLAN 2
On warfarin. Recs from cardiologist in chart - warfarin to be held x 4 days prior to procedure. Last dose 8/4/23 evening.

## 2023-08-04 NOTE — H&P PST ADULT - ASSESSMENT
DASI score:  DASI activity:  Loose teeth or denture: denies DASI score: 8  DASI activity: reports able to perform all house chores w/o CISNEROS; walks for 30 minutes, 2 flights of stairs  Loose teeth or denture: denies

## 2023-08-04 NOTE — H&P PST ADULT - NSICDXFAMILYHX_GEN_ALL_CORE_FT
FAMILY HISTORY:  Father  Still living? Unknown  FH: gastric cancer, Age at diagnosis: Age Unknown    Mother  Still living? Unknown  FH: cerebral aneurysm, Age at diagnosis: Age Unknown  FH: HTN (hypertension), Age at diagnosis: Age Unknown

## 2023-08-04 NOTE — H&P PST ADULT - HISTORY OF PRESENT ILLNESS
62 yo female presents to PST prior to scheduled EGD Endoflip on 8/9/2023 with Dr. Ye Espinal. Pmhx includes Afib on warfarin, chronic systolic HF, DVT, mild pulmonary HTN, syncope (denies syncopal episodes since 2/2023), asthma (controlled; never hospitalized or intubated for asthma). Appears to be poor historian. Accompanied by her daughter. Reports difficulty swallowing foods since April 2023. Denies choking, change in voice, fever, chills, chest pain, palpitations, easy fatigability, dizziness, leg swelling.

## 2023-08-04 NOTE — H&P PST ADULT - NSICDXPASTMEDICALHX_GEN_ALL_CORE_FT
PAST MEDICAL HISTORY:  Asthma     Chronic atrial fibrillation     Chronic systolic congestive heart failure     COVID-19 vaccine series completed W #2 boosters    History of DVT of lower extremity     HTN (hypertension)     Mild pulmonary hypertension

## 2023-08-07 ENCOUNTER — APPOINTMENT (OUTPATIENT)
Dept: INTERNAL MEDICINE | Facility: CLINIC | Age: 63
End: 2023-08-07

## 2023-08-07 DIAGNOSIS — I48.91 UNSPECIFIED ATRIAL FIBRILLATION: ICD-10-CM

## 2023-08-07 DIAGNOSIS — Z51.81 ENCOUNTER FOR THERAPEUTIC DRUG LEVEL MONITORING: ICD-10-CM

## 2023-08-07 DIAGNOSIS — Z79.01 LONG TERM (CURRENT) USE OF ANTICOAGULANTS: ICD-10-CM

## 2023-08-09 ENCOUNTER — APPOINTMENT (OUTPATIENT)
Dept: GASTROENTEROLOGY | Facility: HOSPITAL | Age: 63
End: 2023-08-09

## 2023-08-09 ENCOUNTER — OUTPATIENT (OUTPATIENT)
Dept: OUTPATIENT SERVICES | Facility: HOSPITAL | Age: 63
LOS: 1 days | End: 2023-08-09
Payer: MEDICAID

## 2023-08-09 ENCOUNTER — TRANSCRIPTION ENCOUNTER (OUTPATIENT)
Age: 63
End: 2023-08-09

## 2023-08-09 ENCOUNTER — RESULT REVIEW (OUTPATIENT)
Age: 63
End: 2023-08-09

## 2023-08-09 VITALS
TEMPERATURE: 98 F | HEIGHT: 66 IN | OXYGEN SATURATION: 95 % | DIASTOLIC BLOOD PRESSURE: 85 MMHG | HEART RATE: 82 BPM | RESPIRATION RATE: 15 BRPM | WEIGHT: 149.91 LBS | SYSTOLIC BLOOD PRESSURE: 143 MMHG

## 2023-08-09 VITALS
DIASTOLIC BLOOD PRESSURE: 82 MMHG | SYSTOLIC BLOOD PRESSURE: 172 MMHG | OXYGEN SATURATION: 94 % | HEART RATE: 81 BPM | RESPIRATION RATE: 18 BRPM

## 2023-08-09 DIAGNOSIS — R13.19 OTHER DYSPHAGIA: ICD-10-CM

## 2023-08-09 PROCEDURE — 43239 EGD BIOPSY SINGLE/MULTIPLE: CPT

## 2023-08-09 PROCEDURE — 88305 TISSUE EXAM BY PATHOLOGIST: CPT | Mod: 26

## 2023-08-09 PROCEDURE — 91040 ESOPH BALLOON DISTENSION TST: CPT | Mod: 26

## 2023-08-09 PROCEDURE — 91040 ESOPH BALLOON DISTENSION TST: CPT

## 2023-08-09 PROCEDURE — 88305 TISSUE EXAM BY PATHOLOGIST: CPT

## 2023-08-09 PROCEDURE — 94640 AIRWAY INHALATION TREATMENT: CPT

## 2023-08-09 DEVICE — KIT CVC 2LUM MAC 9FR CHG: Type: IMPLANTABLE DEVICE | Status: FUNCTIONAL

## 2023-08-09 DEVICE — CATH THERMODIL PACE 7.5FR: Type: IMPLANTABLE DEVICE | Status: FUNCTIONAL

## 2023-08-09 DEVICE — NET RETRV ROT ROTH 2.5MMX230CM: Type: IMPLANTABLE DEVICE | Status: FUNCTIONAL

## 2023-08-09 DEVICE — KIT A-LINE 1LUM 20G X 12CM SAFE KIT: Type: IMPLANTABLE DEVICE | Status: FUNCTIONAL

## 2023-08-09 RX ORDER — LIDOCAINE HCL 20 MG/ML
4 VIAL (ML) INJECTION ONCE
Refills: 0 | Status: COMPLETED | OUTPATIENT
Start: 2023-08-09 | End: 2023-08-09

## 2023-08-09 RX ORDER — IPRATROPIUM/ALBUTEROL SULFATE 18-103MCG
3 AEROSOL WITH ADAPTER (GRAM) INHALATION ONCE
Refills: 0 | Status: COMPLETED | OUTPATIENT
Start: 2023-08-09 | End: 2023-08-09

## 2023-08-09 RX ORDER — SODIUM CHLORIDE 9 MG/ML
500 INJECTION INTRAMUSCULAR; INTRAVENOUS; SUBCUTANEOUS
Refills: 0 | Status: COMPLETED | OUTPATIENT
Start: 2023-08-09 | End: 2023-08-09

## 2023-08-09 RX ADMIN — Medication 4 MILLILITER(S): at 16:45

## 2023-08-09 RX ADMIN — SODIUM CHLORIDE 30 MILLILITER(S): 9 INJECTION INTRAMUSCULAR; INTRAVENOUS; SUBCUTANEOUS at 16:57

## 2023-08-09 RX ADMIN — Medication 3 MILLILITER(S): at 16:45

## 2023-08-09 NOTE — PRE PROCEDURE NOTE - PRE PROCEDURE EVALUATION
Detail Level: Simple Additional Notes: Patient consent was obtained to proceed with the visit and recommended plan of care after discussion of all risks and benefits, including the risks of COVID-19 exposure. Attending Physician:              Ye Espinal MD MSEd    Indication for Procedure     abd pain, gerd           PAST MEDICAL & SURGICAL HISTORY:  COVID-19 vaccine series completed  W #2 boosters      HTN (hypertension)      Asthma      Mild pulmonary hypertension      Chronic systolic congestive heart failure      Chronic atrial fibrillation      History of DVT of lower extremity      No significant past surgical history            See Allscripts Note for further details  ALLERGIES:  No Known Allergies    HOME MEDICATIONS:  albuterol 0.63 mg/3 mL (0.021%) inhalation solution: 3 milliliter(s) inhaled 3 times a day, As Needed - for shortness of breath and/or wheezing  Lasix 20 mg oral tablet: 1 tab(s) orally once a day  metoprolol succinate 50 mg oral tablet, extended release: 1 orally once a day  Trelegy Ellipta 100 mcg-62.5 mcg-25 mcg/inh inhalation powder: 1 puff(s) inhaled once a day  warfarin 5 mg oral tablet: 1 orally once a day (at bedtime)      See Allscripts Note for further details    AICD/PPM: [ ] yes   [x ] no    PERTINENT LAB DATA:                      PHYSICAL EXAMINATION:    T(C): --  HR: --  BP: --  RR: --  SpO2: --    Constitutional: NAD  HEENT: PERRLA, EOMI,    Neck:  No JVD  Respiratory: CTAB/L  Cardiovascular: S1 and S2  Gastrointestinal: BS+, soft, NT/ND  Extremities: No peripheral edema  Neurological: A/O x 3, no focal deficits  Psychiatric: Normal mood, normal affect  Skin: No rashes    ASA Class: I [ ]  II [ ]  III [ x]  IV [ ]    COMMENTS:    The patient is a suitable candidate for the planned procedure unless box checked [ ]  No, explain:

## 2023-08-09 NOTE — ASU PATIENT PROFILE, ADULT - FALL HARM RISK - UNIVERSAL INTERVENTIONS
Bed in lowest position, wheels locked, appropriate side rails in place/Call bell, personal items and telephone in reach/Instruct patient to call for assistance before getting out of bed or chair/Non-slip footwear when patient is out of bed/[place] to call system/Physically safe environment - no spills, clutter or unnecessary equipment/Purposeful Proactive Rounding/Room/bathroom lighting operational, light cord in reach

## 2023-08-09 NOTE — ASU PREOP CHECKLIST - BP NONINVASIVE SYSTOLIC (MM HG)
"  2019      RE: Arun Berman  [address]  [facility] [address]         Rhonda Reyes  [facility] [address] [facility] [place] [address]    RE:  Arun Berman  :  2019  [place] MRN:  [id number]  Date of visit:  December 10, 2019          Dear Dr. Reyes:    I had the pleasure of seeing your patient, Arun, today through the [facility] in consultation for the question of swollen left testicle.  Please see below the details of this visit and my impression and plans discussed with the family.      CC:  Consult (Swollen left testicle)       HPI:  Arun Berman is a 6 week old infant whom I was asked to see in consultation for the above.  He is here today with both parents.  Arun was born at 39 weeks and 4 days gestation via vaginal delivery.  Swollen testis or hydrocele was not documented on  discharge summary from the hospital, nor at his 2 week well visit.  Parents first noticed the swelling of the left testis about two weeks ago.  The swelling does not seem to come and go.  There is no bulging noted in the groin.  Arun does not seem to be uncomfortable due to the swelling; however, he does have some periods of fussiness and irritability, for which they have tried gas drops.  Parents feel the scrotum has appeared bluish at times.      Dad has a history of a hydrocele which was repaired around 4 months of age.  There is no other known family history of genitourinary disorders in childhood.        PMH:  History reviewed. No pertinent past medical history.    PSH:   History reviewed. No pertinent surgical history.    Meds, allergies, family history, social history reviewed per intake form and confirmed in our EMR.    ROS:  Negative on a 12-point scale, except for pertinent positives mentioned in the HPI.    PE:  Height 0.569 m (1' 10.4\"), weight 5.15 kg (11 lb 5.7 oz), head circumference 40.3 cm " "(15.87\").  Body mass index is 15.91 kg/m .  General:  Well-appearing child, in no apparent distress.  HEENT:  Normocephalic, normal facies, moist mucus membranes  Resp:  Symmetric chest wall movement, no audible respirations  Abd:  Soft, non-tender, non-distended, no palpable masses, no hernias appreciated  Genitalia:  Phallus circumcised, orthotopic meatus, scrotum asymmetric with left side goldberg than right, left hemiscrotum appears bluish, moderate-sized tense hydrocele is not reducible, assessment of left testis is limited due to hydrocele, right testis is palpated in dependent hemiscrotum, no obvious right hydrocele appreciated   Ext:  Full range of motion  Skin:  Warm, well-perfused    Imaging: All studies were reviewed and visualized by me today in clinic.  EXAMINATION: US TESTICULAR AND SCROTUM WITH DOPPLER LIMITED   2019 2:03 PM       CLINICAL HISTORY: Hydrocele, left       COMPARISON: None available         PROCEDURE COMMENTS: Ultrasound of the scrotum was performed with color  and spectral Doppler.     FINDINGS:  Right testis: 1.3 x 0.8 x 1.3 cm, volume of 0.7 mL.  Left testis: 1.2 x 0.8 x 0.7 cm, volume of 0.4 mL.     Bilateral vaginal type hydroceles, left greater than right with normal  low resistance flow to the testicles. The epididymides are normal. No  suspicious testicular mass. Visualized aspects of the inguinal canals  are unremarkable.                                                                      IMPRESSION:  1.  Bilateral vaginal type hydroceles, left greater than right.  2.  Normal sonographic appearance of the testes.     I have personally reviewed the examination and initial interpretation  and I agree with the findings.     MEHDI HERNANDEZ MD         Impression:  6 week old male infant with 2 week history of non-reducible left hydrocele.  As complete exam of left testis was limited due to the tense hydrocele, a scrotal and testicular ultrasound was obtained today to verify " adequate blood flow to the testis.  Ultrasound is reassuring and demonstrates normal blood flow to both testicles.  Hydroceles are actually seen bilaterally, left greater than right.  We discussed that most hydroceles that are present in newborns usually resolve spontaneously by the second birthday, unless they are accompanied by an inguinal mass or are large.  Bowel incarceration is a potential complication of hernias/hydroceles with the majority of cases occurring in those less than 1 year of age.  Surgical repair is indicated for communicating hydroceles that persist beyond one to two years of age and for idiopathic, hydroceles that are associated with an inguinal mass, and noncommunicating hydroceles that are symptomatic or compromise the skin integrity.      Diagnoses       Codes Comments    Hydrocele, left    -  Primary N43.3            Plan:    1.  Follow up when Arun is about 12 months of age if the hydrocele is still present.  I will have our  at [facility] reach out to get this scheduled.  If hydrocele has resolved, then okay to cancel the appointment.   2.  Please follow up sooner if hydrocele is noted to be coming and going, getting much larger, or appears to be causing discomfort.    3.  Seek emergency care for pain which is severe, is not controlled by over the counter medications, is associated with nausea/vomiting, or is associated with a change in his scrotal/testicular appearance.       Thank you very much for allowing me the opportunity to participate in this nice family's care with you.    Sincerely,    BHARAT Aguilera, CPNP  Pediatric Urology, [facility]    BHARAT Stratton CNP   [phone]

## 2023-08-11 ENCOUNTER — NON-APPOINTMENT (OUTPATIENT)
Age: 63
End: 2023-08-11

## 2023-08-11 PROBLEM — Z86.718 PERSONAL HISTORY OF OTHER VENOUS THROMBOSIS AND EMBOLISM: Chronic | Status: ACTIVE | Noted: 2023-08-04

## 2023-08-11 LAB — SURGICAL PATHOLOGY STUDY: SIGNIFICANT CHANGE UP

## 2023-08-16 ENCOUNTER — APPOINTMENT (OUTPATIENT)
Dept: INTERNAL MEDICINE | Facility: CLINIC | Age: 63
End: 2023-08-16

## 2023-08-16 ENCOUNTER — OUTPATIENT (OUTPATIENT)
Dept: OUTPATIENT SERVICES | Facility: HOSPITAL | Age: 63
LOS: 1 days | End: 2023-08-16

## 2023-08-16 ENCOUNTER — RESULT CHARGE (OUTPATIENT)
Age: 63
End: 2023-08-16

## 2023-08-16 VITALS — OXYGEN SATURATION: 95 % | HEART RATE: 100 BPM

## 2023-08-17 LAB
INR PPP: 1.9 RATIO
POCT-PROTHROMBIN TIME: 21 SECS
QUALITY CONTROL: YES

## 2023-08-18 DIAGNOSIS — I48.91 UNSPECIFIED ATRIAL FIBRILLATION: ICD-10-CM

## 2023-08-18 DIAGNOSIS — Z51.81 ENCOUNTER FOR THERAPEUTIC DRUG LEVEL MONITORING: ICD-10-CM

## 2023-08-18 DIAGNOSIS — Z79.01 LONG TERM (CURRENT) USE OF ANTICOAGULANTS: ICD-10-CM

## 2023-08-21 ENCOUNTER — RESULT CHARGE (OUTPATIENT)
Age: 63
End: 2023-08-21

## 2023-08-21 ENCOUNTER — APPOINTMENT (OUTPATIENT)
Dept: INTERNAL MEDICINE | Facility: CLINIC | Age: 63
End: 2023-08-21

## 2023-08-21 ENCOUNTER — OUTPATIENT (OUTPATIENT)
Dept: OUTPATIENT SERVICES | Facility: HOSPITAL | Age: 63
LOS: 1 days | End: 2023-08-21

## 2023-08-21 ENCOUNTER — APPOINTMENT (OUTPATIENT)
Dept: GASTROENTEROLOGY | Facility: CLINIC | Age: 63
End: 2023-08-21
Payer: MEDICAID

## 2023-08-21 VITALS
DIASTOLIC BLOOD PRESSURE: 78 MMHG | BODY MASS INDEX: 26.16 KG/M2 | HEART RATE: 74 BPM | HEIGHT: 65 IN | WEIGHT: 157 LBS | OXYGEN SATURATION: 98 % | SYSTOLIC BLOOD PRESSURE: 127 MMHG

## 2023-08-21 VITALS — OXYGEN SATURATION: 99 % | TEMPERATURE: 97.6 F | RESPIRATION RATE: 16 BRPM | HEART RATE: 69 BPM

## 2023-08-21 LAB
INR PPP: 2.5 RATIO
POCT-PROTHROMBIN TIME: 30.2 SECS

## 2023-08-21 PROCEDURE — 99213 OFFICE O/P EST LOW 20 MIN: CPT

## 2023-08-21 NOTE — ASSESSMENT
[FreeTextEntry1] : This is a 63-year-old female presenting with symptoms of dysphagia, mostly oropharyngeal.  Upper endoscopy negative for eosinophilic esophagitis with normal Endoflip.  I recommend modified barium swallow with speech pathology.  I recommend esophageal motility testing.  In the meantime she is to eat slowly and chew her food well.

## 2023-08-21 NOTE — PHYSICAL EXAM
[Alert] : alert [Normal Voice/Communication] : normal voice/communication [Healthy Appearing] : healthy appearing [No Acute Distress] : no acute distress [Sclera] : the sclera and conjunctiva were normal [Hearing Threshold Finger Rub Not [place]] : hearing was normal [Normal Lips/Gums] : the lips and gums were normal [Oropharynx] : the oropharynx was normal [Normal Appearance] : the appearance of the neck was normal [No Neck Mass] : no neck mass was observed [No Respiratory Distress] : no respiratory distress [No Acc Muscle Use] : no accessory muscle use [Respiration, Rhythm And Depth] : normal respiratory rhythm and effort [Auscultation Breath Sounds / Voice Sounds] : lungs were clear to auscultation bilaterally [Heart Rate And Rhythm] : heart rate was normal and rhythm regular [Normal S1, S2] : normal S1 and S2 [Murmurs] : no murmurs [Abdomen Tenderness] : non-tender [Bowel Sounds] : normal bowel sounds [No Masses] : no abdominal mass palpated [Abdomen Soft] : soft [] : no hepatosplenomegaly [Oriented To Time, Place, And Person] : oriented to person, place, and time

## 2023-08-22 DIAGNOSIS — Z51.81 ENCOUNTER FOR THERAPEUTIC DRUG LEVEL MONITORING: ICD-10-CM

## 2023-08-22 DIAGNOSIS — I48.91 UNSPECIFIED ATRIAL FIBRILLATION: ICD-10-CM

## 2023-08-22 DIAGNOSIS — Z79.01 LONG TERM (CURRENT) USE OF ANTICOAGULANTS: ICD-10-CM

## 2023-08-23 ENCOUNTER — NON-APPOINTMENT (OUTPATIENT)
Age: 63
End: 2023-08-23

## 2023-08-28 ENCOUNTER — OUTPATIENT (OUTPATIENT)
Dept: OUTPATIENT SERVICES | Facility: HOSPITAL | Age: 63
LOS: 1 days | End: 2023-08-28

## 2023-08-28 ENCOUNTER — NON-APPOINTMENT (OUTPATIENT)
Age: 63
End: 2023-08-28

## 2023-08-28 ENCOUNTER — RESULT CHARGE (OUTPATIENT)
Age: 63
End: 2023-08-28

## 2023-08-28 ENCOUNTER — APPOINTMENT (OUTPATIENT)
Dept: INTERNAL MEDICINE | Facility: CLINIC | Age: 63
End: 2023-08-28

## 2023-08-28 VITALS — OXYGEN SATURATION: 98 % | HEART RATE: 52 BPM

## 2023-08-28 LAB
INR PPP: 2.8 RATIO
POCT-PROTHROMBIN TIME: 33.6 SECS
QUALITY CONTROL: YES

## 2023-08-30 DIAGNOSIS — Z79.01 LONG TERM (CURRENT) USE OF ANTICOAGULANTS: ICD-10-CM

## 2023-08-30 DIAGNOSIS — Z51.81 ENCOUNTER FOR THERAPEUTIC DRUG LEVEL MONITORING: ICD-10-CM

## 2023-08-30 DIAGNOSIS — I48.91 UNSPECIFIED ATRIAL FIBRILLATION: ICD-10-CM

## 2023-09-04 ENCOUNTER — NON-APPOINTMENT (OUTPATIENT)
Age: 63
End: 2023-09-04

## 2023-09-05 ENCOUNTER — APPOINTMENT (OUTPATIENT)
Dept: OTOLARYNGOLOGY | Facility: CLINIC | Age: 63
End: 2023-09-05
Payer: MEDICAID

## 2023-09-05 VITALS — HEIGHT: 65 IN | BODY MASS INDEX: 26.16 KG/M2 | WEIGHT: 157 LBS

## 2023-09-05 VITALS — HEART RATE: 80 BPM | SYSTOLIC BLOOD PRESSURE: 145 MMHG | DIASTOLIC BLOOD PRESSURE: 92 MMHG

## 2023-09-05 PROCEDURE — 31575 DIAGNOSTIC LARYNGOSCOPY: CPT

## 2023-09-05 PROCEDURE — 99203 OFFICE O/P NEW LOW 30 MIN: CPT | Mod: 25

## 2023-09-05 NOTE — PROCEDURE
[de-identified] : Procedure: Transnasal flexible laryngoscopy  Description: Informed consent was obtained from the patient prior to the procedure. The patient was seated in the clinic chair. Topical anesthesia was achieved by first spraying the nasal cavities with 4% lidocaine and 1% phenylephrine.   Exam: This demonstrates a clear vallecula and crisp epiglottis. The aryepiglottic folds are intact and symmetric bilaterally. The hypopharynx does not demonstrate pooling. The interarytenoid space demonstrates no lesions. It does not demonstrate pachydermia. The false vocal folds are symmetric and without lesions or masses. False fold voicing (plica ventricularis) is not noted today. The true vocal folds show normal and symmetric motion bilaterally. There is no paradoxical motion. The right medial edge is crisp and shows no lesions or masses. The left medial edge is crisp and shows no lesions or masses. The mucosal covering is minimally edematous. There is not erythema present today. There are no obvious vascular ectasias present. The vocal processes do not demonstrate granulomas. The subglottis and proximal trachea is clear and unobstructed to the limits of the examination today.

## 2023-09-05 NOTE — ASSESSMENT
[FreeTextEntry1] : Assessment/Plan: #1 Dysphagia  After a thorough discussion of our findings today, the patient understands we need to do further workup to determine the potential cause of their dysphagia.  As such I have ordered them for a Modified Barium Swallow (MBS) to be performed by our SLP team.  I would like to follow up with them in clinic following this swallow study to go over the results and next steps.  The patient expressed understanding and agreement with this plan.

## 2023-09-05 NOTE — HISTORY OF PRESENT ILLNESS
[de-identified] : ROSANGELA CHAMPION is a 63 year old woman who presents to the [facility] with difficulty swallowing since 2022 or 2021.  Underwent EGD with endoflip with Dr. Espinal on 8/9/23 showing a normal esophagus biopsies negative for eosinophilic esophagitis, stomach normal, duodenal normal. Endoflip suggesting normal LES distensibility. They do note altering their diet to avoid troublesome consistencies - eating soft diet  They do not note regurgitation of recently eaten foods.  They do note recent weight loss - weight is fluctuating  They do not note frequent classic reflux symptoms. No dysphagia to liquids.   Esophagram performed on 3/14/23 and reviewed by myself shows: Esophageal reflux. Barium tablet impacted at the GE junction. Possible achalasia.  Prior Pertinent Procedures: 8/9/23; EGD with endoflip; Dr. Espinal

## 2023-09-06 ENCOUNTER — TRANSCRIPTION ENCOUNTER (OUTPATIENT)
Age: 63
End: 2023-09-06

## 2023-09-06 ENCOUNTER — APPOINTMENT (OUTPATIENT)
Dept: GASTROENTEROLOGY | Facility: HOSPITAL | Age: 63
End: 2023-09-06

## 2023-09-06 ENCOUNTER — NON-APPOINTMENT (OUTPATIENT)
Age: 63
End: 2023-09-06

## 2023-09-06 ENCOUNTER — OUTPATIENT (OUTPATIENT)
Dept: OUTPATIENT SERVICES | Facility: HOSPITAL | Age: 63
LOS: 1 days | End: 2023-09-06
Payer: MEDICAID

## 2023-09-06 VITALS
RESPIRATION RATE: 16 BRPM | WEIGHT: 156.09 LBS | HEART RATE: 79 BPM | DIASTOLIC BLOOD PRESSURE: 79 MMHG | SYSTOLIC BLOOD PRESSURE: 122 MMHG | TEMPERATURE: 98 F | HEIGHT: 65 IN | OXYGEN SATURATION: 100 %

## 2023-09-06 DIAGNOSIS — R13.12 DYSPHAGIA, OROPHARYNGEAL PHASE: ICD-10-CM

## 2023-09-06 PROCEDURE — 43239 EGD BIOPSY SINGLE/MULTIPLE: CPT

## 2023-09-06 PROCEDURE — 91037 ESOPH IMPED FUNCTION TEST: CPT | Mod: 26

## 2023-09-06 PROCEDURE — 91010 ESOPHAGUS MOTILITY STUDY: CPT | Mod: 26

## 2023-09-06 PROCEDURE — 91040 ESOPH BALLOON DISTENSION TST: CPT

## 2023-09-06 RX ORDER — WARFARIN SODIUM 2.5 MG/1
1 TABLET ORAL
Refills: 0 | DISCHARGE

## 2023-09-06 RX ORDER — ALBUTEROL 90 UG/1
3 AEROSOL, METERED ORAL
Qty: 0 | Refills: 0 | DISCHARGE

## 2023-09-06 RX ORDER — FUROSEMIDE 40 MG
1 TABLET ORAL
Qty: 0 | Refills: 0 | DISCHARGE

## 2023-09-06 RX ORDER — FLUTICASONE FUROATE, UMECLIDINIUM BROMIDE AND VILANTEROL TRIFENATATE 200; 62.5; 25 UG/1; UG/1; UG/1
1 POWDER RESPIRATORY (INHALATION)
Qty: 0 | Refills: 0 | DISCHARGE

## 2023-09-06 RX ORDER — METOPROLOL TARTRATE 50 MG
1 TABLET ORAL
Refills: 0 | DISCHARGE

## 2023-09-06 NOTE — PRE PROCEDURE NOTE - PRE PROCEDURE EVALUATION
Attending Physician:              Ye Espinal MD MSEd    Indication for Procedure:       Nunu Anderson, AGNP-C                PAST MEDICAL & SURGICAL HISTORY:  COVID-19 vaccine series completed  W #2 boosters      HTN (hypertension)      Asthma      Mild pulmonary hypertension      Chronic systolic congestive heart failure      Chronic atrial fibrillation      History of DVT of lower extremity      No significant past surgical history            See Allscripts Note for further details  ALLERGIES:  No Known Allergies    HOME MEDICATIONS:  albuterol 0.63 mg/3 mL (0.021%) inhalation solution: 3 milliliter(s) inhaled 3 times a day, As Needed - for shortness of breath and/or wheezing  Lasix 20 mg oral tablet: 1 tab(s) orally once a day  metoprolol succinate 50 mg oral tablet, extended release: 1 orally once a day  Trelegy Ellipta 100 mcg-62.5 mcg-25 mcg/inh inhalation powder: 1 puff(s) inhaled once a day  warfarin 5 mg oral tablet: 1 orally once a day (at bedtime)      See Allscripts Note for further details    AICD/PPM: [ ] yes   [x ] no    PERTINENT LAB DATA:                      PHYSICAL EXAMINATION:    Height (cm): 165.1  Weight (kg): 70.8  BMI (kg/m2): 26  BSA (m2): 1.78T(C): 36.5  HR: 79  BP: 122/79  RR: 16  SpO2: 100%    Constitutional: NAD  HEENT: PERRLA, EOMI,    Neck:  No JVD  Respiratory: CTAB/L  Cardiovascular: S1 and S2  Gastrointestinal: BS+, soft, NT/ND  Extremities: No peripheral edema  Neurological: A/O x 3, no focal deficits  Psychiatric: Normal mood, normal affect  Skin: No rashes    ASA Class: I [ ]  II [ x]  III [ ]  IV [ ]    COMMENTS:    The patient is a suitable candidate for the planned procedure unless box checked [ ]  No, explain:

## 2023-09-07 ENCOUNTER — APPOINTMENT (OUTPATIENT)
Dept: INTERNAL MEDICINE | Facility: CLINIC | Age: 63
End: 2023-09-07

## 2023-09-07 ENCOUNTER — RESULT CHARGE (OUTPATIENT)
Age: 63
End: 2023-09-07

## 2023-09-07 ENCOUNTER — NON-APPOINTMENT (OUTPATIENT)
Age: 63
End: 2023-09-07

## 2023-09-07 ENCOUNTER — OUTPATIENT (OUTPATIENT)
Dept: OUTPATIENT SERVICES | Facility: HOSPITAL | Age: 63
LOS: 1 days | End: 2023-09-07

## 2023-09-07 VITALS — HEART RATE: 87 BPM | OXYGEN SATURATION: 98 %

## 2023-09-07 LAB
INR PPP: 3 RATIO
POCT-PROTHROMBIN TIME: 36.4 SECS
QUALITY CONTROL: YES

## 2023-09-08 DIAGNOSIS — Z79.01 LONG TERM (CURRENT) USE OF ANTICOAGULANTS: ICD-10-CM

## 2023-09-14 ENCOUNTER — RESULT CHARGE (OUTPATIENT)
Age: 63
End: 2023-09-14

## 2023-09-14 ENCOUNTER — APPOINTMENT (OUTPATIENT)
Dept: INTERNAL MEDICINE | Facility: CLINIC | Age: 63
End: 2023-09-14

## 2023-09-14 ENCOUNTER — NON-APPOINTMENT (OUTPATIENT)
Age: 63
End: 2023-09-14

## 2023-09-14 ENCOUNTER — OUTPATIENT (OUTPATIENT)
Dept: OUTPATIENT SERVICES | Facility: HOSPITAL | Age: 63
LOS: 1 days | End: 2023-09-14

## 2023-09-14 VITALS — OXYGEN SATURATION: 99 % | HEART RATE: 67 BPM

## 2023-09-14 LAB
INR PPP: 1.8 RATIO
POCT-PROTHROMBIN TIME: 22.1 SECS
QUALITY CONTROL: YES

## 2023-09-15 DIAGNOSIS — Z79.01 LONG TERM (CURRENT) USE OF ANTICOAGULANTS: ICD-10-CM

## 2023-09-21 ENCOUNTER — APPOINTMENT (OUTPATIENT)
Dept: INTERNAL MEDICINE | Facility: CLINIC | Age: 63
End: 2023-09-21

## 2023-09-25 ENCOUNTER — APPOINTMENT (OUTPATIENT)
Dept: INTERNAL MEDICINE | Facility: CLINIC | Age: 63
End: 2023-09-25
Payer: MEDICAID

## 2023-09-25 ENCOUNTER — OUTPATIENT (OUTPATIENT)
Dept: OUTPATIENT SERVICES | Facility: HOSPITAL | Age: 63
LOS: 1 days | End: 2023-09-25

## 2023-09-25 ENCOUNTER — RESULT CHARGE (OUTPATIENT)
Age: 63
End: 2023-09-25

## 2023-09-25 VITALS
SYSTOLIC BLOOD PRESSURE: 144 MMHG | OXYGEN SATURATION: 99 % | HEART RATE: 73 BPM | BODY MASS INDEX: 25.99 KG/M2 | WEIGHT: 156 LBS | HEIGHT: 65 IN | DIASTOLIC BLOOD PRESSURE: 82 MMHG

## 2023-09-25 VITALS — DIASTOLIC BLOOD PRESSURE: 70 MMHG | SYSTOLIC BLOOD PRESSURE: 132 MMHG

## 2023-09-25 DIAGNOSIS — Z01.811 ENCOUNTER FOR PREPROCEDURAL RESPIRATORY EXAMINATION: ICD-10-CM

## 2023-09-25 DIAGNOSIS — Z23 ENCOUNTER FOR IMMUNIZATION: ICD-10-CM

## 2023-09-25 DIAGNOSIS — Z86.39 PERSONAL HISTORY OF OTHER ENDOCRINE, NUTRITIONAL AND METABOLIC DISEASE: ICD-10-CM

## 2023-09-25 DIAGNOSIS — R13.19 OTHER DYSPHAGIA: ICD-10-CM

## 2023-09-25 DIAGNOSIS — Z87.898 PERSONAL HISTORY OF OTHER SPECIFIED CONDITIONS: ICD-10-CM

## 2023-09-25 LAB
INR PPP: 2.3 RATIO
POCT-PROTHROMBIN TIME: 27.1 SECS
QUALITY CONTROL: YES

## 2023-09-25 PROCEDURE — 90460 IM ADMIN 1ST/ONLY COMPONENT: CPT | Mod: NC

## 2023-09-25 PROCEDURE — 99214 OFFICE O/P EST MOD 30 MIN: CPT | Mod: 25

## 2023-09-25 PROCEDURE — 36416 COLLJ CAPILLARY BLOOD SPEC: CPT | Mod: NC

## 2023-09-25 RX ORDER — POTASSIUM CHLORIDE 1500 MG/1
20 TABLET, EXTENDED RELEASE ORAL TWICE DAILY
Qty: 2 | Refills: 0 | Status: COMPLETED | COMMUNITY
Start: 2023-08-07 | End: 2023-08-08

## 2023-09-26 PROBLEM — Z23 ENCOUNTER FOR IMMUNIZATION: Status: ACTIVE | Noted: 2022-04-13 | Resolved: 2023-10-10

## 2023-09-27 ENCOUNTER — APPOINTMENT (OUTPATIENT)
Dept: INTERNAL MEDICINE | Facility: CLINIC | Age: 63
End: 2023-09-27
Payer: MEDICAID

## 2023-09-27 VITALS
WEIGHT: 154 LBS | SYSTOLIC BLOOD PRESSURE: 116 MMHG | TEMPERATURE: 98.1 F | BODY MASS INDEX: 25.66 KG/M2 | HEIGHT: 65 IN | DIASTOLIC BLOOD PRESSURE: 79 MMHG | HEART RATE: 77 BPM | OXYGEN SATURATION: 99 %

## 2023-09-27 PROCEDURE — 99396 PREV VISIT EST AGE 40-64: CPT | Mod: 25

## 2023-09-27 PROCEDURE — G0444 DEPRESSION SCREEN ANNUAL: CPT | Mod: 59

## 2023-09-27 PROCEDURE — 99215 OFFICE O/P EST HI 40 MIN: CPT | Mod: 25

## 2023-09-28 DIAGNOSIS — Z51.81 ENCOUNTER FOR THERAPEUTIC DRUG LEVEL MONITORING: ICD-10-CM

## 2023-09-28 DIAGNOSIS — Z23 ENCOUNTER FOR IMMUNIZATION: ICD-10-CM

## 2023-09-28 DIAGNOSIS — I10 ESSENTIAL (PRIMARY) HYPERTENSION: ICD-10-CM

## 2023-09-28 DIAGNOSIS — I48.91 UNSPECIFIED ATRIAL FIBRILLATION: ICD-10-CM

## 2023-09-28 DIAGNOSIS — J45.909 UNSPECIFIED ASTHMA, UNCOMPLICATED: ICD-10-CM

## 2023-09-28 DIAGNOSIS — R13.19 OTHER DYSPHAGIA: ICD-10-CM

## 2023-09-28 DIAGNOSIS — Z79.01 LONG TERM (CURRENT) USE OF ANTICOAGULANTS: ICD-10-CM

## 2023-10-03 ENCOUNTER — NON-APPOINTMENT (OUTPATIENT)
Age: 63
End: 2023-10-03

## 2023-10-03 LAB
ALBUMIN SERPL ELPH-MCNC: 4.2 G/DL
ALP BLD-CCNC: 98 U/L
ALT SERPL-CCNC: 18 U/L
ANION GAP SERPL CALC-SCNC: 10 MMOL/L
AST SERPL-CCNC: 28 U/L
BASOPHILS # BLD AUTO: 0.02 K/UL
BASOPHILS NFR BLD AUTO: 0.5 %
BILIRUB SERPL-MCNC: 0.2 MG/DL
BUN SERPL-MCNC: 11 MG/DL
CALCIUM SERPL-MCNC: 9.9 MG/DL
CHLORIDE SERPL-SCNC: 106 MMOL/L
CHOLEST SERPL-MCNC: 167 MG/DL
CO2 SERPL-SCNC: 27 MMOL/L
CREAT SERPL-MCNC: 0.71 MG/DL
EGFR: 95 ML/MIN/1.73M2
EOSINOPHIL # BLD AUTO: 0.17 K/UL
EOSINOPHIL NFR BLD AUTO: 3.9 %
GLUCOSE SERPL-MCNC: 72 MG/DL
HCT VFR BLD CALC: 38.7 %
HDLC SERPL-MCNC: 61 MG/DL
HGB BLD-MCNC: 12.4 G/DL
IMM GRANULOCYTES NFR BLD AUTO: 0.2 %
LDLC SERPL CALC-MCNC: 95 MG/DL
LYMPHOCYTES # BLD AUTO: 1.63 K/UL
LYMPHOCYTES NFR BLD AUTO: 37.1 %
MAN DIFF?: NORMAL
MCHC RBC-ENTMCNC: 27.8 PG
MCHC RBC-ENTMCNC: 32 GM/DL
MCV RBC AUTO: 86.8 FL
MONOCYTES # BLD AUTO: 0.35 K/UL
MONOCYTES NFR BLD AUTO: 8 %
NEUTROPHILS # BLD AUTO: 2.21 K/UL
NEUTROPHILS NFR BLD AUTO: 50.3 %
NONHDLC SERPL-MCNC: 105 MG/DL
PLATELET # BLD AUTO: 199 K/UL
POTASSIUM SERPL-SCNC: 3.8 MMOL/L
PROT SERPL-MCNC: 7.6 G/DL
RBC # BLD: 4.46 M/UL
RBC # FLD: 14.9 %
SODIUM SERPL-SCNC: 142 MMOL/L
T4 FREE SERPL-MCNC: 1.1 NG/DL
TRIGL SERPL-MCNC: 47 MG/DL
TSH SERPL-ACNC: 0.45 UIU/ML
WBC # FLD AUTO: 4.39 K/UL

## 2023-10-06 ENCOUNTER — APPOINTMENT (OUTPATIENT)
Dept: INTERNAL MEDICINE | Facility: CLINIC | Age: 63
End: 2023-10-06
Payer: MEDICAID

## 2023-10-06 VITALS
OXYGEN SATURATION: 97 % | DIASTOLIC BLOOD PRESSURE: 85 MMHG | WEIGHT: 154 LBS | BODY MASS INDEX: 25.66 KG/M2 | HEIGHT: 65 IN | SYSTOLIC BLOOD PRESSURE: 138 MMHG | HEART RATE: 66 BPM

## 2023-10-06 DIAGNOSIS — Z79.01 LONG TERM (CURRENT) USE OF ANTICOAGULANTS: ICD-10-CM

## 2023-10-06 LAB
INR PPP: 39.4 RATIO
POCT-PROTHROMBIN TIME: 3.3 SECS

## 2023-10-06 PROCEDURE — 99215 OFFICE O/P EST HI 40 MIN: CPT | Mod: 25

## 2023-10-06 PROCEDURE — 85610 PROTHROMBIN TIME: CPT | Mod: QW

## 2023-10-26 ENCOUNTER — NON-APPOINTMENT (OUTPATIENT)
Age: 63
End: 2023-10-26

## 2023-10-26 ENCOUNTER — APPOINTMENT (OUTPATIENT)
Dept: SPEECH THERAPY | Facility: HOSPITAL | Age: 63
End: 2023-10-26

## 2023-10-26 ENCOUNTER — APPOINTMENT (OUTPATIENT)
Dept: RADIOLOGY | Facility: HOSPITAL | Age: 63
End: 2023-10-26

## 2023-10-29 ENCOUNTER — RX RENEWAL (OUTPATIENT)
Age: 63
End: 2023-10-29

## 2023-11-08 ENCOUNTER — APPOINTMENT (OUTPATIENT)
Dept: CARDIOLOGY | Facility: CLINIC | Age: 63
End: 2023-11-08

## 2023-11-13 ENCOUNTER — APPOINTMENT (OUTPATIENT)
Dept: INTERNAL MEDICINE | Facility: CLINIC | Age: 63
End: 2023-11-13
Payer: MEDICAID

## 2023-11-13 DIAGNOSIS — L81.9 DISORDER OF PIGMENTATION, UNSPECIFIED: ICD-10-CM

## 2023-11-13 PROCEDURE — 99215 OFFICE O/P EST HI 40 MIN: CPT

## 2023-11-15 ENCOUNTER — RX RENEWAL (OUTPATIENT)
Age: 63
End: 2023-11-15

## 2023-11-17 ENCOUNTER — APPOINTMENT (OUTPATIENT)
Dept: PULMONOLOGY | Facility: CLINIC | Age: 63
End: 2023-11-17
Payer: MEDICAID

## 2023-11-17 VITALS
RESPIRATION RATE: 16 BRPM | OXYGEN SATURATION: 99 % | BODY MASS INDEX: 24.99 KG/M2 | TEMPERATURE: 97.6 F | WEIGHT: 150 LBS | SYSTOLIC BLOOD PRESSURE: 130 MMHG | DIASTOLIC BLOOD PRESSURE: 88 MMHG | HEART RATE: 94 BPM | HEIGHT: 65 IN

## 2023-11-17 PROCEDURE — 99214 OFFICE O/P EST MOD 30 MIN: CPT | Mod: 25

## 2023-11-17 PROCEDURE — 94618 PULMONARY STRESS TESTING: CPT

## 2023-11-17 PROCEDURE — 94010 BREATHING CAPACITY TEST: CPT

## 2023-11-18 PROBLEM — L81.9 HYPERPIGMENTATION: Status: ACTIVE | Noted: 2023-02-15

## 2023-12-13 ENCOUNTER — APPOINTMENT (OUTPATIENT)
Dept: INTERNAL MEDICINE | Facility: CLINIC | Age: 63
End: 2023-12-13
Payer: MEDICAID

## 2023-12-13 VITALS
HEIGHT: 65 IN | DIASTOLIC BLOOD PRESSURE: 92 MMHG | WEIGHT: 151 LBS | SYSTOLIC BLOOD PRESSURE: 137 MMHG | BODY MASS INDEX: 25.16 KG/M2 | OXYGEN SATURATION: 99 % | HEART RATE: 86 BPM

## 2023-12-13 DIAGNOSIS — Z79.01 ENCOUNTER FOR THERAPEUTIC DRUG LVL MONITORING: ICD-10-CM

## 2023-12-13 DIAGNOSIS — Z51.81 ENCOUNTER FOR THERAPEUTIC DRUG LVL MONITORING: ICD-10-CM

## 2023-12-13 DIAGNOSIS — I50.42 CHRONIC COMBINED SYSTOLIC (CONGESTIVE) AND DIASTOLIC (CONGESTIVE) HEART FAILURE: ICD-10-CM

## 2023-12-13 PROCEDURE — 99215 OFFICE O/P EST HI 40 MIN: CPT

## 2023-12-13 NOTE — PLAN
[FreeTextEntry1] : [ ] thyroid US  - repeat 6 months for surveillance of nodule  [ ] colonoscopy - never completed, patient will schedule in our office [ ] hx of CVA in [facility] - will need neurologist   #HTN Patient with history of hypertension.  BP today acceptable Patient to continue current regimen as prescribed. Patient instructed to continue monitoring blood pressure at home and to keep a journal. Will continue to monitor patient's bp and adjust his hypertensive regimen as needed.  #A-fib on coumadin - recent INR therapeutic  [ ] will transition to DOAC discussed with cardiologist + patient's daughter - all in agreement  Spoke to patient's daughter (Lupe Mcginnis) at length - no close family in [place] - next of kin would be daughters in [facility] - feel as though patient is safe living alone, no concerns for safety at this time  Feel as though patient's memory may be an issue - but seems to be okay as of now - discussed medications with patient and seems organized with all medication bottles in office today

## 2023-12-13 NOTE — HEALTH RISK ASSESSMENT
[Good] : ~his/her~  mood as  good [No] : In the past 12 months have you used drugs other than those required for medical reasons? No [No falls in past year] : Patient reported no falls in the past year [0] : 2) Feeling down, depressed, or hopeless: Not at all (0) [PHQ-2 Negative - No further assessment needed] : PHQ-2 Negative - No further assessment needed [Never] : Never [Behavioral] : behavioral [Health Literacy] : health literacy [Transportation] : transportation [Alone] : lives alone [Retired] : retired [# Of Children ___] : has [unfilled] children [Feels Safe at Home] : Feels safe at home [Fully functional (bathing, dressing, toileting, transferring, walking, feeding)] : Fully functional (bathing, dressing, toileting, transferring, walking, feeding) [Fully functional (using the telephone, shopping, preparing meals, housekeeping, doing laundry, using] : Fully functional and needs no help or supervision to perform IADLs (using the telephone, shopping, preparing meals, housekeeping, doing laundry, using transportation, managing medications and managing finances) [de-identified] : no formal exercise [de-identified] : balanced varied diet without restrictions [[id number]] : 0 [High Risk Behavior] : no high risk behavior [Reports changes in hearing] : Reports no changes in hearing [Reports changes in vision] : Reports no changes in vision [MammogramDate] : 6/2023 [PapSmearComments] : unknown - will confirm [BoneDensityComments] : will discuss in future  [ColonoscopyComments] : never - will submit referral  [de-identified] : will obtain collateral

## 2023-12-13 NOTE — HISTORY OF PRESENT ILLNESS
[FreeTextEntry1] : Patient presents today for follow-up of chronic medical conditions.  [de-identified] : PMHx - afib, dysphagia, asthma, s/p CVA   Poor historian Feels well - some fatigue Lives alone - two daughters who live in [place]  Chart reviewed to elicit medical history  Spoke Lupe Mcginnis - daughter - obtained medical history  Injury to leg due to injury at [place]  Discussed transition to DOAC

## 2023-12-14 LAB
ALBUMIN SERPL ELPH-MCNC: 4.1 G/DL
ALP BLD-CCNC: 89 U/L
ALT SERPL-CCNC: 15 U/L
ANION GAP SERPL CALC-SCNC: 13 MMOL/L
AST SERPL-CCNC: 25 U/L
BASOPHILS # BLD AUTO: 0.02 K/UL
BASOPHILS NFR BLD AUTO: 0.4 %
BILIRUB SERPL-MCNC: 0.6 MG/DL
BUN SERPL-MCNC: 14 MG/DL
CALCIUM SERPL-MCNC: 10.1 MG/DL
CHLORIDE SERPL-SCNC: 105 MMOL/L
CO2 SERPL-SCNC: 24 MMOL/L
CREAT SERPL-MCNC: 0.81 MG/DL
EGFR: 82 ML/MIN/1.73M2
EOSINOPHIL # BLD AUTO: 0.13 K/UL
EOSINOPHIL NFR BLD AUTO: 2.5 %
ESTIMATED AVERAGE GLUCOSE: 117 MG/DL
GLUCOSE SERPL-MCNC: 79 MG/DL
HBA1C MFR BLD HPLC: 5.7 %
HCT VFR BLD CALC: 38 %
HGB BLD-MCNC: 12.1 G/DL
IMM GRANULOCYTES NFR BLD AUTO: 0.4 %
LYMPHOCYTES # BLD AUTO: 1.85 K/UL
LYMPHOCYTES NFR BLD AUTO: 35.4 %
MAN DIFF?: NORMAL
MCHC RBC-ENTMCNC: 27.6 PG
MCHC RBC-ENTMCNC: 31.8 GM/DL
MCV RBC AUTO: 86.8 FL
MONOCYTES # BLD AUTO: 0.33 K/UL
MONOCYTES NFR BLD AUTO: 6.3 %
NEUTROPHILS # BLD AUTO: 2.87 K/UL
NEUTROPHILS NFR BLD AUTO: 55 %
PLATELET # BLD AUTO: 202 K/UL
POTASSIUM SERPL-SCNC: 3.9 MMOL/L
PROT SERPL-MCNC: 7.5 G/DL
RBC # BLD: 4.38 M/UL
RBC # FLD: 13.9 %
SODIUM SERPL-SCNC: 141 MMOL/L
T4 FREE SERPL-MCNC: 1.2 NG/DL
TSH SERPL-ACNC: 0.64 UIU/ML
WBC # FLD AUTO: 5.22 K/UL

## 2023-12-27 ENCOUNTER — EMERGENCY (EMERGENCY)
Facility: HOSPITAL | Age: 63
LOS: 1 days | Discharge: HOME | End: 2023-12-27
Attending: EMERGENCY MEDICINE
Payer: MEDICAID

## 2023-12-27 VITALS
SYSTOLIC BLOOD PRESSURE: 154 MMHG | HEIGHT: 66 IN | OXYGEN SATURATION: 98 % | HEART RATE: 93 BPM | RESPIRATION RATE: 18 BRPM | WEIGHT: 160.06 LBS | DIASTOLIC BLOOD PRESSURE: 107 MMHG | TEMPERATURE: 98 F

## 2023-12-27 VITALS
OXYGEN SATURATION: 98 % | SYSTOLIC BLOOD PRESSURE: 133 MMHG | TEMPERATURE: 98 F | RESPIRATION RATE: 16 BRPM | DIASTOLIC BLOOD PRESSURE: 84 MMHG | HEART RATE: 84 BPM

## 2023-12-27 LAB
ALBUMIN SERPL ELPH-MCNC: 3.9 G/DL — SIGNIFICANT CHANGE UP (ref 3.3–5)
ALBUMIN SERPL ELPH-MCNC: 3.9 G/DL — SIGNIFICANT CHANGE UP (ref 3.3–5)
ALP SERPL-CCNC: 91 U/L — SIGNIFICANT CHANGE UP (ref 40–120)
ALP SERPL-CCNC: 91 U/L — SIGNIFICANT CHANGE UP (ref 40–120)
ALT FLD-CCNC: 11 U/L — SIGNIFICANT CHANGE UP (ref 10–45)
ALT FLD-CCNC: 11 U/L — SIGNIFICANT CHANGE UP (ref 10–45)
ANION GAP SERPL CALC-SCNC: 9 MMOL/L — SIGNIFICANT CHANGE UP (ref 5–17)
ANION GAP SERPL CALC-SCNC: 9 MMOL/L — SIGNIFICANT CHANGE UP (ref 5–17)
AST SERPL-CCNC: 18 U/L — SIGNIFICANT CHANGE UP (ref 10–40)
AST SERPL-CCNC: 18 U/L — SIGNIFICANT CHANGE UP (ref 10–40)
BASOPHILS # BLD AUTO: 0.01 K/UL — SIGNIFICANT CHANGE UP (ref 0–0.2)
BASOPHILS # BLD AUTO: 0.01 K/UL — SIGNIFICANT CHANGE UP (ref 0–0.2)
BASOPHILS NFR BLD AUTO: 0.2 % — SIGNIFICANT CHANGE UP (ref 0–2)
BASOPHILS NFR BLD AUTO: 0.2 % — SIGNIFICANT CHANGE UP (ref 0–2)
BILIRUB SERPL-MCNC: 0.3 MG/DL — SIGNIFICANT CHANGE UP (ref 0.2–1.2)
BILIRUB SERPL-MCNC: 0.3 MG/DL — SIGNIFICANT CHANGE UP (ref 0.2–1.2)
BUN SERPL-MCNC: 9 MG/DL — SIGNIFICANT CHANGE UP (ref 7–23)
BUN SERPL-MCNC: 9 MG/DL — SIGNIFICANT CHANGE UP (ref 7–23)
CALCIUM SERPL-MCNC: 9.3 MG/DL — SIGNIFICANT CHANGE UP (ref 8.4–10.5)
CALCIUM SERPL-MCNC: 9.3 MG/DL — SIGNIFICANT CHANGE UP (ref 8.4–10.5)
CHLORIDE SERPL-SCNC: 106 MMOL/L — SIGNIFICANT CHANGE UP (ref 96–108)
CHLORIDE SERPL-SCNC: 106 MMOL/L — SIGNIFICANT CHANGE UP (ref 96–108)
CK SERPL-CCNC: 120 U/L — SIGNIFICANT CHANGE UP (ref 25–170)
CK SERPL-CCNC: 120 U/L — SIGNIFICANT CHANGE UP (ref 25–170)
CO2 SERPL-SCNC: 26 MMOL/L — SIGNIFICANT CHANGE UP (ref 22–31)
CO2 SERPL-SCNC: 26 MMOL/L — SIGNIFICANT CHANGE UP (ref 22–31)
CREAT SERPL-MCNC: 0.68 MG/DL — SIGNIFICANT CHANGE UP (ref 0.5–1.3)
CREAT SERPL-MCNC: 0.68 MG/DL — SIGNIFICANT CHANGE UP (ref 0.5–1.3)
EGFR: 98 ML/MIN/1.73M2 — SIGNIFICANT CHANGE UP
EGFR: 98 ML/MIN/1.73M2 — SIGNIFICANT CHANGE UP
EOSINOPHIL # BLD AUTO: 0.14 K/UL — SIGNIFICANT CHANGE UP (ref 0–0.5)
EOSINOPHIL # BLD AUTO: 0.14 K/UL — SIGNIFICANT CHANGE UP (ref 0–0.5)
EOSINOPHIL NFR BLD AUTO: 3.3 % — SIGNIFICANT CHANGE UP (ref 0–6)
EOSINOPHIL NFR BLD AUTO: 3.3 % — SIGNIFICANT CHANGE UP (ref 0–6)
GLUCOSE SERPL-MCNC: 100 MG/DL — HIGH (ref 70–99)
GLUCOSE SERPL-MCNC: 100 MG/DL — HIGH (ref 70–99)
HCT VFR BLD CALC: 37.6 % — SIGNIFICANT CHANGE UP (ref 34.5–45)
HCT VFR BLD CALC: 37.6 % — SIGNIFICANT CHANGE UP (ref 34.5–45)
HGB BLD-MCNC: 12.2 G/DL — SIGNIFICANT CHANGE UP (ref 11.5–15.5)
HGB BLD-MCNC: 12.2 G/DL — SIGNIFICANT CHANGE UP (ref 11.5–15.5)
IMM GRANULOCYTES NFR BLD AUTO: 0.2 % — SIGNIFICANT CHANGE UP (ref 0–0.9)
IMM GRANULOCYTES NFR BLD AUTO: 0.2 % — SIGNIFICANT CHANGE UP (ref 0–0.9)
LYMPHOCYTES # BLD AUTO: 1.24 K/UL — SIGNIFICANT CHANGE UP (ref 1–3.3)
LYMPHOCYTES # BLD AUTO: 1.24 K/UL — SIGNIFICANT CHANGE UP (ref 1–3.3)
LYMPHOCYTES # BLD AUTO: 29.3 % — SIGNIFICANT CHANGE UP (ref 13–44)
LYMPHOCYTES # BLD AUTO: 29.3 % — SIGNIFICANT CHANGE UP (ref 13–44)
MCHC RBC-ENTMCNC: 28 PG — SIGNIFICANT CHANGE UP (ref 27–34)
MCHC RBC-ENTMCNC: 28 PG — SIGNIFICANT CHANGE UP (ref 27–34)
MCHC RBC-ENTMCNC: 32.4 GM/DL — SIGNIFICANT CHANGE UP (ref 32–36)
MCHC RBC-ENTMCNC: 32.4 GM/DL — SIGNIFICANT CHANGE UP (ref 32–36)
MCV RBC AUTO: 86.2 FL — SIGNIFICANT CHANGE UP (ref 80–100)
MCV RBC AUTO: 86.2 FL — SIGNIFICANT CHANGE UP (ref 80–100)
MONOCYTES # BLD AUTO: 0.23 K/UL — SIGNIFICANT CHANGE UP (ref 0–0.9)
MONOCYTES # BLD AUTO: 0.23 K/UL — SIGNIFICANT CHANGE UP (ref 0–0.9)
MONOCYTES NFR BLD AUTO: 5.4 % — SIGNIFICANT CHANGE UP (ref 2–14)
MONOCYTES NFR BLD AUTO: 5.4 % — SIGNIFICANT CHANGE UP (ref 2–14)
NEUTROPHILS # BLD AUTO: 2.6 K/UL — SIGNIFICANT CHANGE UP (ref 1.8–7.4)
NEUTROPHILS # BLD AUTO: 2.6 K/UL — SIGNIFICANT CHANGE UP (ref 1.8–7.4)
NEUTROPHILS NFR BLD AUTO: 61.6 % — SIGNIFICANT CHANGE UP (ref 43–77)
NEUTROPHILS NFR BLD AUTO: 61.6 % — SIGNIFICANT CHANGE UP (ref 43–77)
NRBC # BLD: 0 /100 WBCS — SIGNIFICANT CHANGE UP (ref 0–0)
NRBC # BLD: 0 /100 WBCS — SIGNIFICANT CHANGE UP (ref 0–0)
PLATELET # BLD AUTO: 221 K/UL — SIGNIFICANT CHANGE UP (ref 150–400)
PLATELET # BLD AUTO: 221 K/UL — SIGNIFICANT CHANGE UP (ref 150–400)
POTASSIUM SERPL-MCNC: 3.5 MMOL/L — SIGNIFICANT CHANGE UP (ref 3.5–5.3)
POTASSIUM SERPL-MCNC: 3.5 MMOL/L — SIGNIFICANT CHANGE UP (ref 3.5–5.3)
POTASSIUM SERPL-SCNC: 3.5 MMOL/L — SIGNIFICANT CHANGE UP (ref 3.5–5.3)
POTASSIUM SERPL-SCNC: 3.5 MMOL/L — SIGNIFICANT CHANGE UP (ref 3.5–5.3)
PROT SERPL-MCNC: 7.4 G/DL — SIGNIFICANT CHANGE UP (ref 6–8.3)
PROT SERPL-MCNC: 7.4 G/DL — SIGNIFICANT CHANGE UP (ref 6–8.3)
RBC # BLD: 4.36 M/UL — SIGNIFICANT CHANGE UP (ref 3.8–5.2)
RBC # BLD: 4.36 M/UL — SIGNIFICANT CHANGE UP (ref 3.8–5.2)
RBC # FLD: 13.8 % — SIGNIFICANT CHANGE UP (ref 10.3–14.5)
RBC # FLD: 13.8 % — SIGNIFICANT CHANGE UP (ref 10.3–14.5)
RHEUMATOID FACT SERPL-ACNC: 10 IU/ML — SIGNIFICANT CHANGE UP (ref 0–13)
RHEUMATOID FACT SERPL-ACNC: 10 IU/ML — SIGNIFICANT CHANGE UP (ref 0–13)
SODIUM SERPL-SCNC: 141 MMOL/L — SIGNIFICANT CHANGE UP (ref 135–145)
SODIUM SERPL-SCNC: 141 MMOL/L — SIGNIFICANT CHANGE UP (ref 135–145)
WBC # BLD: 4.23 K/UL — SIGNIFICANT CHANGE UP (ref 3.8–10.5)
WBC # BLD: 4.23 K/UL — SIGNIFICANT CHANGE UP (ref 3.8–10.5)
WBC # FLD AUTO: 4.23 K/UL — SIGNIFICANT CHANGE UP (ref 3.8–10.5)
WBC # FLD AUTO: 4.23 K/UL — SIGNIFICANT CHANGE UP (ref 3.8–10.5)

## 2023-12-27 PROCEDURE — 86431 RHEUMATOID FACTOR QUANT: CPT

## 2023-12-27 PROCEDURE — 99284 EMERGENCY DEPT VISIT MOD MDM: CPT

## 2023-12-27 PROCEDURE — 80053 COMPREHEN METABOLIC PANEL: CPT

## 2023-12-27 PROCEDURE — 85025 COMPLETE CBC W/AUTO DIFF WBC: CPT

## 2023-12-27 PROCEDURE — 86140 C-REACTIVE PROTEIN: CPT

## 2023-12-27 PROCEDURE — 36415 COLL VENOUS BLD VENIPUNCTURE: CPT

## 2023-12-27 PROCEDURE — 82550 ASSAY OF CK (CPK): CPT

## 2023-12-27 RX ORDER — ACETAMINOPHEN 500 MG
975 TABLET ORAL ONCE
Refills: 0 | Status: COMPLETED | OUTPATIENT
Start: 2023-12-27 | End: 2023-12-27

## 2023-12-27 RX ADMIN — Medication 975 MILLIGRAM(S): at 08:00

## 2023-12-27 NOTE — ED PROVIDER NOTE - PHYSICAL EXAMINATION
Attending Zulema Ferrer: Gen: NAD, heent: atrauamtic, eomi, perrla, mmm, op pink, uvula midline, neck; nttp, no nuchal rigidity, chest: nttp, no crepitus, cv: rrr, no murmurs, lungs: ctab, abd: soft, nontender, nondistended, no peritoneal signs, no guarding, ext: wwp, neg homans, skin: no rash, neuro: awake and alert, following commands, speech clear, sensation and strength intact, no focal deficits stable gait, on clonus, strong distal pulses Attending Zluema Ferrer: Gen: NAD, heent: atrauamtic, eomi, perrla, mmm, op pink, uvula midline, neck; nttp, no nuchal rigidity, chest: nttp, no crepitus, cv: rrr, no murmurs, lungs: ctab, abd: soft, nontender, nondistended, no peritoneal signs, no guarding, ext: wwp, neg homans, skin: no rash, neuro: awake and alert, following commands, speech clear, sensation and strength intact, no focal deficits stable gait, on clonus, strong distal pulses

## 2023-12-27 NOTE — ED PROVIDER NOTE - ATTENDING APP SHARED VISIT CONTRIBUTION OF CARE
Attending MD Zulema Ferrer:   I personally have seen and examined this patient.  Physician assistant note reviewed and agree on plan of care and except where noted.  See HPI, PE, and MDM for details.

## 2023-12-27 NOTE — ED ADULT TRIAGE NOTE - HEIGHT IN FEET
Normal contour; nontender; liver palpable < 2 cm below rib margin, with sharp edge; adequate bowel sound pattern for age; no bruits; spleen tip absent or slightly below rib margin; kidney size and shape, if palpable is acceptable; abdominal distention and masses absent; abdominal wall defects absent; scaphoid abdomen absent; umbilicus with 3 vessels, normal color size, and texture. 5

## 2023-12-27 NOTE — ED PROVIDER NOTE - OBJECTIVE STATEMENT
Attending Zulema Ferrer: 62 yo female on eliquis with h/o heart disease presenting with concern for bilateral upper thigh stiffness. pt states symptoms for last week. has been walking more. no falls or trauma. no numbness or tingling. is able to ambulate. denies any back pain. no fevers or chills. no other joint pain. no dark urine. has been eating and drinking. no weakness

## 2023-12-27 NOTE — ED ADULT NURSE NOTE - OBJECTIVE STATEMENT
63y F BIB self from home p/w b/l leg pain. Pt is axo4, ambulates independently at baseline. On AC (eliquis) with h/o heart disease. Mentions b/l upper thigh stiffness for last week. Has been walking more. Denies any recent falls or trauma. No numbness or tingling. Denies headache, dizziness, vision changes, chest pain, shortness of breath, abdominal pain, nausea, vomiting, diarrhea, fevers, chills, dysuria, hematuria, recent illness or travel. Patient undressed and placed into gown, call bell in hand and side rails up with bed in lowest position for safety. blanket provided. Comfort and safety provided.

## 2023-12-27 NOTE — ED PROVIDER NOTE - CARE PROVIDER_API CALL
Cali Trejo  Neurology  [address]  [place] [address]  Phone: [phone]  Fax: [phone]  Follow Up Time:    Cali Trejo  Neurology  [address], [address]  [place] [address]  Phone: [phone]  Fax: [phone]  Follow Up Time:

## 2023-12-27 NOTE — ED PROVIDER NOTE - CLINICAL SUMMARY MEDICAL DECISION MAKING FREE TEXT BOX
Attending Zulema Ferrer: 62 yo female presenting with bl leg stiffness. upon arrival pt awake and alert following commands. strong distal pulses and compartment soft making arterial occlusion or claudication less likely. no LE swelling and no calf ttp making dvt less likely. no rash to suggest shingles. will check labs, including ck. consider posible developing PMR although pt is able to ambulate without difficulty Attending Zulema Ferrer: 64 yo female presenting with bl leg stiffness. upon arrival pt awake and alert following commands. strong distal pulses and compartment soft making arterial occlusion or claudication less likely. no LE swelling and no calf ttp making dvt less likely. no rash to suggest shingles. will check labs, including ck. consider posible developing PMR although pt is able to ambulate without difficulty Attending Zulema Ferrer: 62 yo female presenting with bl leg stiffness. upon arrival pt awake and alert following commands. strong distal pulses and compartment soft making arterial occlusion or claudication less likely. no LE swelling and no calf ttp making dvt less likely. no rash to suggest shingles. will check labs, including ck. consider posible developing PMR although pt is able to ambulate without difficulty. sensation and strength intact, no recent vaccination less likely ascending paralysis or GBS as sensation and atrength intact Attending Zulema Ferrer: 64 yo female presenting with bl leg stiffness. upon arrival pt awake and alert following commands. strong distal pulses and compartment soft making arterial occlusion or claudication less likely. no LE swelling and no calf ttp making dvt less likely. no rash to suggest shingles. will check labs, including ck. consider posible developing PMR although pt is able to ambulate without difficulty. sensation and strength intact, no recent vaccination less likely ascending paralysis or GBS as sensation and atrength intact

## 2023-12-27 NOTE — ED PROVIDER NOTE - NSFOLLOWUPINSTRUCTIONS_ED_ALL_ED_FT
Please return to the emergency department if you have any worsening pain or swelling to your legs.   Please return If you have any color changes to your feet or any numbness or tingling  Please call your primary care physician today to make a follow up appointment  A rheumatoid factor was sent, this test takes time to result. If it is abnormal you will receive a call back  Please take tylenol as needed for pain. you can take 650mg every 8 hours. Please return to the emergency department if you have any worsening pain or swelling to your legs.   Please return If you have any color changes to your feet or any numbness or tingling  Please call your primary care physician today to make a follow up appointment  A rheumatoid factor was sent, this test takes time to result. If it is abnormal you will receive a call back  Please take tylenol as needed for pain. you can take 650mg every 8 hours.  Please follow up with the neurologist

## 2023-12-27 NOTE — ED ADULT NURSE NOTE - NSFALLUNIVINTERV_ED_ALL_ED
Bed/Stretcher in lowest position, wheels locked, appropriate side rails in place/Call bell, personal items and telephone in reach/Instruct patient to call for assistance before getting out of bed/chair/stretcher/Non-slip footwear applied when patient is off stretcher/[place] to call system/Physically safe environment - no spills, clutter or unnecessary equipment/Purposeful proactive rounding/Room/bathroom lighting operational, light cord in reach Bed/Stretcher in lowest position, wheels locked, appropriate side rails in place/Call bell, personal items and telephone in reach/Instruct patient to call for assistance before getting out of bed/chair/stretcher/Non-slip footwear applied when patient is off stretcher/[place] to call system/Physically safe environment - no spills, clutter or unnecessary equipment/Purposeful proactive rounding/Room/bathroom lighting operational, light cord in reach

## 2023-12-27 NOTE — ED PROVIDER NOTE - MDM ORDERS SUBMITTED SELECTION
Presenting Problem: "I want to know if my Lipitor could be refilled  I am out "  Service Type: Prescription Refills  Charged Service 1: Prescription Refills  Pharmacy Name and  Number: [facility] [phone]  Nurse Assessment  Protocols  Protocol Title Nurse Date/Time  Disp  Time Disposition Final User  7/20/2019 9:26:58 AM Send to [address] [place] [address] Jolene  7/20/2019 2:07:12 PM Close Yes Ranft, RN, Hien Steen  Comments  User: Kevin Romero RN Date/Time: 7/20/2019 2:07:05 PM  I attempted to call patient back and received voice mail  A message was left that her refill request will be placed in her chart and  taken care of when the office opens Monday  Advised her to call back if she has questions  Labs

## 2023-12-27 NOTE — ED PROVIDER NOTE - PATIENT PORTAL LINK FT
You can access the FollowMyHealth Patient Portal offered by [facility] by registering at the following website: [email or web address]. By joining [employer]’s FollowMyHealth portal, you will also be able to view your health information using other applications (apps) compatible with our system. You can access the FollowMyHealth Patient Portal offered by [facility] by registering at the following website: [email or web address]. By joining [employer]’s FollowMyHealth portal, you will also be able to view your health information using other applications (apps) compatible with our system.

## 2024-03-11 ENCOUNTER — APPOINTMENT (OUTPATIENT)
Dept: INTERNAL MEDICINE | Facility: CLINIC | Age: 64
End: 2024-03-11
Payer: MEDICAID

## 2024-03-11 VITALS
BODY MASS INDEX: 25.78 KG/M2 | TEMPERATURE: 98.5 F | HEART RATE: 89 BPM | WEIGHT: 151 LBS | OXYGEN SATURATION: 97 % | SYSTOLIC BLOOD PRESSURE: 170 MMHG | DIASTOLIC BLOOD PRESSURE: 90 MMHG | HEIGHT: 64 IN

## 2024-03-11 DIAGNOSIS — R13.12 DYSPHAGIA, OROPHARYNGEAL PHASE: ICD-10-CM

## 2024-03-11 DIAGNOSIS — E04.1 NONTOXIC SINGLE THYROID NODULE: ICD-10-CM

## 2024-03-11 PROCEDURE — 99215 OFFICE O/P EST HI 40 MIN: CPT

## 2024-03-11 PROCEDURE — G2211 COMPLEX E/M VISIT ADD ON: CPT | Mod: NC,1L

## 2024-03-11 RX ORDER — WARFARIN 5 MG/1
5 TABLET ORAL
Qty: 180 | Refills: 3 | Status: DISCONTINUED | COMMUNITY
Start: 2022-10-25 | End: 2024-03-11

## 2024-03-11 RX ORDER — FUROSEMIDE 20 MG/1
20 TABLET ORAL
Qty: 90 | Refills: 3 | Status: DISCONTINUED | COMMUNITY
Start: 2022-07-20 | End: 2024-03-11

## 2024-03-11 NOTE — PLAN
[FreeTextEntry1] : [ ] thyroid US  - repeat 6 months for surveillance of nodule  [ ] colonoscopy - never completed, patient will schedule in our office [ ] hx of CVA in [facility] - will need neurologist   #HTN Patient with history of hypertension.  BP today acceptable Patient to continue current regimen as prescribed. Patient instructed to continue monitoring blood pressure at home and to keep a journal. Will continue to monitor patient's bp and adjust his hypertensive regimen as needed.  #A-fib on coumadin - recent INR therapeutic  [ ] will transition to DOAC discussed with cardiologist + patient's daughter - all in agreement  Spoke to patient's daughter (Lupe Mcginnis) at length - no close family in [place] - next of kin would be daughters in [facility] - feel as though patient is safe living alone, no concerns for safety at this time  Feel as though patient's memory may be an issue - but seems to be okay as of now - discussed medications with patient and seems organized with all medication bottles in office today

## 2024-03-11 NOTE — HISTORY OF PRESENT ILLNESS
[FreeTextEntry1] : Patient presents today for follow-up of chronic medical conditions.  [de-identified] : PMHx - afib, dysphagia, asthma, s/p CVA   Poor historian Feels well - some fatigue Lives alone - two daughters who live in [place]  Chart reviewed to elicit medical history  Spoke Lupe Mcginnis - daughter - obtained medical history  Injury to leg due to injury at [place]  Discussed transition to DOAC

## 2024-03-11 NOTE — HEALTH RISK ASSESSMENT
[Good] : ~his/her~ current health as good [No] : No [No falls in past year] : Patient reported no falls in the past year [0] : 2) Feeling down, depressed, or hopeless: Not at all (0) [PHQ-2 Negative - No further assessment needed] : PHQ-2 Negative - No further assessment needed [Never] : Never [Health Literacy] : health literacy [Behavioral] : behavioral [Transportation] : transportation [Alone] : lives alone [# Of Children ___] : has [unfilled] children [Retired] : retired [Feels Safe at Home] : Feels safe at home [Fully functional (bathing, dressing, toileting, transferring, walking, feeding)] : Fully functional (bathing, dressing, toileting, transferring, walking, feeding) [Fully functional (using the telephone, shopping, preparing meals, housekeeping, doing laundry, using] : Fully functional and needs no help or supervision to perform IADLs (using the telephone, shopping, preparing meals, housekeeping, doing laundry, using transportation, managing medications and managing finances) [de-identified] : no formal exercise [[id number]] : 0 [de-identified] : balanced varied diet without restrictions [High Risk Behavior] : no high risk behavior [Reports changes in vision] : Reports no changes in vision [Reports changes in hearing] : Reports no changes in hearing [MammogramDate] : 6/2023 [PapSmearComments] : unknown - will confirm [ColonoscopyComments] : never - will submit referral  [BoneDensityComments] : will discuss in future  [de-identified] : will obtain collateral

## 2024-03-11 NOTE — HISTORY OF PRESENT ILLNESS
[FreeTextEntry1] : Patient presents today for follow-up of chronic medical conditions.  [de-identified] : PMHx - afib, dysphagia, asthma, s/p CVA   Poor historian Feels well - some fatigue Lives alone - two daughters who live in [place]  Chart reviewed to elicit medical history  Spoke Lupe Mcginnis - daughter - obtained medical history  Injury to leg due to injury at [facility]  Discussed transition to DOAC

## 2024-03-11 NOTE — PLAN
[FreeTextEntry1] : [ ] thyroid US  - repeat 6 months for surveillance of nodule  [ ] colonoscopy - never completed, patient will schedule in our office [ ] hx of CVA in [facility] - will need neurologist   #HTN Patient with history of hypertension.  BP today acceptable Patient to continue current regimen as prescribed. Patient instructed to continue monitoring blood pressure at home and to keep a journal. Will continue to monitor patient's bp and adjust his hypertensive regimen as needed.  #A-fib on coumadin - recent INR therapeutic  [ ] will transition to DOAC discussed with cardiologist + patient's daughter - all in agreement  Spoke to patient's daughter (Lupe Mcginnis) at length - no close family in [place] - next of kin would be daughters in [facility] - feel as though patient is safe living alone, no concerns for safety at this time  Feel as though patient's memory may be an issue - but seems to be okay as of now - discussed medications with patient and seems organized with all medication bottles in office today

## 2024-03-11 NOTE — HEALTH RISK ASSESSMENT
[Good] : ~his/her~  mood as  good [No] : In the past 12 months have you used drugs other than those required for medical reasons? No [No falls in past year] : Patient reported no falls in the past year [0] : 2) Feeling down, depressed, or hopeless: Not at all (0) [PHQ-2 Negative - No further assessment needed] : PHQ-2 Negative - No further assessment needed [Never] : Never [Behavioral] : behavioral [Health Literacy] : health literacy [Alone] : lives alone [Transportation] : transportation [Retired] : retired [# Of Children ___] : has [unfilled] children [Feels Safe at Home] : Feels safe at home [Fully functional (bathing, dressing, toileting, transferring, walking, feeding)] : Fully functional (bathing, dressing, toileting, transferring, walking, feeding) [Fully functional (using the telephone, shopping, preparing meals, housekeeping, doing laundry, using] : Fully functional and needs no help or supervision to perform IADLs (using the telephone, shopping, preparing meals, housekeeping, doing laundry, using transportation, managing medications and managing finances) [de-identified] : no formal exercise [de-identified] : balanced varied diet without restrictions [[id number]] : 0 [High Risk Behavior] : no high risk behavior [Reports changes in vision] : Reports no changes in vision [Reports changes in hearing] : Reports no changes in hearing [PapSmearComments] : unknown - will confirm [MammogramDate] : 6/2023 [BoneDensityComments] : will discuss in future  [ColonoscopyComments] : never - will submit referral  [de-identified] : will obtain collateral

## 2024-03-13 LAB
ALBUMIN SERPL ELPH-MCNC: 4.1 G/DL
ALP BLD-CCNC: 102 U/L
ALT SERPL-CCNC: 15 U/L
ANION GAP SERPL CALC-SCNC: 13 MMOL/L
AST SERPL-CCNC: 22 U/L
BASOPHILS # BLD AUTO: 0.03 K/UL
BASOPHILS NFR BLD AUTO: 0.6 %
BILIRUB SERPL-MCNC: 0.4 MG/DL
BUN SERPL-MCNC: 13 MG/DL
CALCIUM SERPL-MCNC: 9.5 MG/DL
CHLORIDE SERPL-SCNC: 105 MMOL/L
CHOLEST SERPL-MCNC: 159 MG/DL
CO2 SERPL-SCNC: 26 MMOL/L
CREAT SERPL-MCNC: 0.79 MG/DL
EGFR: 84 ML/MIN/1.73M2
EOSINOPHIL # BLD AUTO: 0.12 K/UL
EOSINOPHIL NFR BLD AUTO: 2.4 %
ESTIMATED AVERAGE GLUCOSE: 108 MG/DL
GLUCOSE SERPL-MCNC: 104 MG/DL
HBA1C MFR BLD HPLC: 5.4 %
HCT VFR BLD CALC: 36.7 %
HDLC SERPL-MCNC: 76 MG/DL
HGB BLD-MCNC: 12.5 G/DL
IMM GRANULOCYTES NFR BLD AUTO: 0.2 %
LDLC SERPL CALC-MCNC: 73 MG/DL
LYMPHOCYTES # BLD AUTO: 1.77 K/UL
LYMPHOCYTES NFR BLD AUTO: 35.5 %
MAN DIFF?: NORMAL
MCHC RBC-ENTMCNC: 28.5 PG
MCHC RBC-ENTMCNC: 34.1 GM/DL
MCV RBC AUTO: 83.8 FL
MONOCYTES # BLD AUTO: 0.37 K/UL
MONOCYTES NFR BLD AUTO: 7.4 %
NEUTROPHILS # BLD AUTO: 2.69 K/UL
NEUTROPHILS NFR BLD AUTO: 53.9 %
NONHDLC SERPL-MCNC: 83 MG/DL
PLATELET # BLD AUTO: 220 K/UL
POTASSIUM SERPL-SCNC: 3.6 MMOL/L
PROT SERPL-MCNC: 7.6 G/DL
RBC # BLD: 4.38 M/UL
RBC # FLD: 14.6 %
SODIUM SERPL-SCNC: 144 MMOL/L
TRIGL SERPL-MCNC: 42 MG/DL
WBC # FLD AUTO: 4.99 K/UL

## 2024-04-01 ENCOUNTER — APPOINTMENT (OUTPATIENT)
Dept: PULMONOLOGY | Facility: CLINIC | Age: 64
End: 2024-04-01
Payer: MEDICAID

## 2024-04-01 VITALS
WEIGHT: 151 LBS | BODY MASS INDEX: 25.78 KG/M2 | HEIGHT: 64 IN | OXYGEN SATURATION: 98 % | DIASTOLIC BLOOD PRESSURE: 86 MMHG | RESPIRATION RATE: 16 BRPM | HEART RATE: 84 BPM | TEMPERATURE: 97.9 F | SYSTOLIC BLOOD PRESSURE: 130 MMHG

## 2024-04-01 DIAGNOSIS — R76.8 OTHER SPECIFIED ABNORMAL IMMUNOLOGICAL FINDINGS IN SERUM: ICD-10-CM

## 2024-04-01 DIAGNOSIS — R06.02 SHORTNESS OF BREATH: ICD-10-CM

## 2024-04-01 DIAGNOSIS — J82.83 EOSINOPHILIC ASTHMA: ICD-10-CM

## 2024-04-01 DIAGNOSIS — J30.9 ALLERGIC RHINITIS, UNSPECIFIED: ICD-10-CM

## 2024-04-01 DIAGNOSIS — R06.83 SNORING: ICD-10-CM

## 2024-04-01 PROCEDURE — 94010 BREATHING CAPACITY TEST: CPT

## 2024-04-01 PROCEDURE — 95012 NITRIC OXIDE EXP GAS DETER: CPT

## 2024-04-01 PROCEDURE — 94618 PULMONARY STRESS TESTING: CPT

## 2024-04-01 PROCEDURE — 99214 OFFICE O/P EST MOD 30 MIN: CPT | Mod: 25

## 2024-04-01 PROCEDURE — 94729 DIFFUSING CAPACITY: CPT

## 2024-04-01 PROCEDURE — 94727 GAS DIL/WSHOT DETER LNG VOL: CPT

## 2024-04-01 NOTE — PROCEDURE
[FreeTextEntry1] : Full PFT reveals mild restrictive dysfunction; FEV1 was 1.74L which is 73% of predicted; normal lung volumes; normal diffusion at 15.9, which is 79% of predicted; normal flow volume loop. PFTs were performed to evaluate for SOB  6 minute walk test reveals a low saturation of 96% with no evidence of dyspnea or fatigue; walked 391.2 meters   FENO was 25; a normal value being less than 25 Fractional exhaled nitric oxide (FENO) is regarded as a simple, noninvasive method for assessing eosinophilic airway inflammation. Produced by a variety of cells within the lung, nitric oxide (NO) concentrations are generally low in healthy individuals. However, high concentrations of NO appear to be involved in nonspecific host defense mechanisms and chronic inflammatory diseases such as asthma. The American Thoracic Society (ATS) therefore has recommended using FENO to aid in the diagnosis and monitoring of eosinophilic airway inflammation and asthma, and for identifying steroid responsive individuals whose chronic respiratory symptoms may be caused by airway inflammation.

## 2024-04-01 NOTE — PHYSICAL EXAM
[No Acute Distress] : no acute distress [Normal Oropharynx] : normal oropharynx [III] : Mallampati Class: III [Normal Appearance] : normal appearance [No Neck Mass] : no neck mass [Normal S1, S2] : normal s1, s2 [No Abnormalities] : no abnormalities [Clear to Auscultation Bilaterally] : clear to auscultation bilaterally [No Resp Distress] : no resp distress [Benign] : benign [Normal Gait] : normal gait [No Cyanosis] : no cyanosis [No Clubbing] : no clubbing [FROM] : FROM [No Edema] : no edema [No Focal Deficits] : no focal deficits [Normal Color/ Pigmentation] : normal color/ pigmentation [Normal Affect] : normal affect [Oriented x3] : oriented x3 [Normal Rate/Rhythm] : normal rate/rhythm [Murmur ___ / 6] : murmur [unfilled] / 6 [TextBox_2] : ow  [TextBox_68] : I:E ratio 1:3; clear

## 2024-04-01 NOTE — REASON FOR VISIT
[Follow-Up] : a follow-up visit [Family Member] : family member [TextBox_44] : sob, mild persistent asthma, ?allergies, no JULIANNA, mild PAH

## 2024-04-01 NOTE — HISTORY OF PRESENT ILLNESS
[TextBox_4] : Ms. CHAMPION is a 63 year old female with a history of A-fib, grade 3 diastolic dysfunction, positive messi, asthma presenting to the office today for a follow-up pulmonary evaluation. Her chief complaint is   -she notes feeling generally well  -she notes dysphagia with solids. She eats quickly -she notes bowels are regular  -she notes sinuses are quiet, though she anticipates her allergy Sx coming on in the next 2-3 weeks -she notes her sleep is interrupted every 2-3 hours -she denies dysphonia  -she notes exercising daily (walking 60 minutes per day) without limitations -she denies taking any new medications, vitamins, or supplements  -she notes she has a pending procedure 6/2023 because of dysphagia (Dr. Espinal). She's concerned about dysmotility  -she denies any headaches, nausea, emesis, fever, chills, sweats, chest pain, chest pressure, coughing, wheezing, palpitations, diarrhea, constipation, vertigo, arthralgias, myalgias, leg swelling, itchy eyes, itchy ears, heartburn, reflux, or sour taste in the mouth.

## 2024-04-01 NOTE — ADDENDUM
[FreeTextEntry1] : Documented by Naina Haas acting as a scribe for Dr. Seth Curtis on 04/01/2024. All medical record entries made by the Scribe were at my, Dr. Seth Curtis's, direction and personally dictated by me on 04/01/2024. I have reviewed the chart and agree that the record accurately reflects my personal performance of the history, physical exam, assessment and plan. I have also personally directed, reviewed, and agree with the discharge instructions.

## 2024-04-01 NOTE — ASSESSMENT
[FreeTextEntry1] : Ms. CHAMPION is a 63 year old female with a history of a fib, grade 3 diastolic dysfunction, positive CAROLE, asthma, and mild PAH presenting to the office today for follow up pulmonary evaluation for SOB, mild persistent asthma, + allergies, no JULIANNA- +PAH (?WHO group 1/2/3)- pending GI motility issue  Her shortness of breath is multifactorial due to: -poor mechanics of breathing -out of shape / overweight -pulmonary disease  -Mild restrictive dysfunction- WNL Diffusion   -?PAH    -asthma -cardiac disease  Problem 1: Mild persistent Asthma- controlled -continue Albuterol 0.83% via nebulizer, Q6H -continue Ventolin 2 puffs Q6H, pre-exercise PRN  -continue Trelegy 100 1 puff QD -Asthma is believed to be caused by inherited (genetic) and environmental factor, but its exact cause is unknown. Asthma may be triggered by allergens, lung infections, or irritants in the air. Asthma triggers are different for each person -Inhaler technique reviewed as well as oral hygiene techniques reviewed with patient. Avoidance of cold air, extremes of temperature, rescue inhaler should be used before exercise. Order of medication reviewed with patient. Recommended use of a cool mist humidifier in the bedroom.  Problem 1A: Eosinophilic Asthma- controlled -candidate for Nucala/Fasenra/Dupixent - The safety and efficacy of Nucala was established in three double-blind, randomized, placebo-controlled trials in patients with severe asthma. Compared to a placebo, patients with severe asthma receiving Nucala had fewer exacerbation requiring hospitalization and/or emergency department visits, and a longer time to first exacerbation. In addition, patients with severe asthma receiving Nucala or Fasenra experienced greater reductions in their daily maintenance oral corticosteroid dose, while maintaining asthma control compared with patients receiving placebo. Treatment with Nucala did not result in a significant improvement in lung function, as measured by the volume of air exhaled by patients in one second. The most common side effects include: headache, injection site reactions, back pain, weakness, and fatigue; hypersensitivity reactions can occur within hours or days including swelling of the face, mouth, and tongue, fainting, dizziness, hives, breathing problems, and rash; herpes zoster infections have occurred. The drug is a monoclonal antibody that inhibits interleukin-5 which helps regular eosinophils, a type of white blood cell that contributes to asthma. The over-production of eosinophils can cause inflammation in the lungs, increasing the frequency of asthma attacks. Patients must also take other medications, including high dose inhaled corticosteroids and at least one additional asthma drug.  Problem 1B: Elevated IgE 174 - Xolair candidate if needed -Xolair is a recombinant DNA- derived humanized IgG1K monoclonal antibody that selectively binds ot human immunoglobulin E (IgE). Xolair is produced by a Chinese hamster ovary cell suspension culture in nutrient medium containing the antibiotic gentamicin. Gentamicin is not detectable in the final product. Xolair is a sterile, white, preservative free, lyophilized powder contained in a single use vial that is reconstituted with sterile water for suspension. Side effects include: wheezing, tightness of the chest, trouble breathing, hives, skin rash, feeling anxious or light-headed, fainting, warmth or tingling under skin, or swelling of face, lips, or tongue  Problem 2: + Allergies- pollen -add Clarinex 5 mg QHS  - s/p given for blood work: + asthma profile, + food IgE panel, eosinophil level, IgE level 174, Vitamin D level Low Environmental measures for allergies were encouraged including mattress and pillow covers, air purifier, and environmental controls.  Problem 2A: Low Vitamin D - Supplement - Has been associated with asthma exacerbations and increased allergic symptoms. The goal based on recent information is maintaining levels between 50-70 and low normal is 30. Recommended 50,000 units every two weeks to once a month depending on the level.  Problem 3: NO JULIANNA (Mallampati class, A fib) -s/p home sleep study - not present Sleep apnea is associated with adverse clinical consequences which can affect most organ systems. Cardiovascular disease risk includes arrhythmias, atrial fibrillation, hypertension, coronary artery disease, and stroke. Metabolic disorders include diabetes type 2, non-alcoholic fatty liver disease. Mood disorder especially depression; and cognitive decline especially in the elderly. Associations with chronic reflux/Wilson's esophagus some but not all inclusive. -Reasons include arousal consistent with hypopnea; respiratory events most prominent in REM sleep or supine position; therefore sleep staging and body position are important for accurate diagnosis and estimation of AHI.  Problem 4: GERD/LPR (Lin/Teddy) -endoscopy and manometry pending - intervention -Rule of 2s: avoid eating too much, eating too late, eating too spicy, eating two hours before bed. - Things to avoid including overeating, spicy foods, tight clothing, eating within two hours of bed, this list is not all inclusive. - For treatments of reflux, possible options discussed including diet control, H2 blockers, PPIs, as well as coating motility agents discussed as treatment options. Timing of meals and proximity of last meal to sleep were discussed. If symptoms persist, a formal gastrointestinal evaluation is needed.  problem 5: cardiac disease -recommended to continue to follow up with Cardiologist (Dr. Mejia)- ECHO 11/2023 (reorder 4/2024)  problem 5A: + PAH (?WHO group 1/2/3) - next ECHO 11/2023 (reorder 4/2024) - RHC after if needed VQ scan  problem 6: poor breathing mechanics -Proper breathing techniques were reviewed with an emphasis of exhalation. Patient instructed to breath in for 1 second and out for four seconds. Patient was encouraged to not talk while walking.  problem 7: Rule out vascular abnormality - Get Doppler ([place])  problem 8: out of shape / overweight -Weight loss, exercise, and diet control were discussed and are highly encouraged. Treatment options were given such as, aqua therapy, and contacting a nutritionist. Recommended to use the elliptical, stationary bike, less use of treadmill. Mindful eating was explained to the patient Obesity is associated with worsening asthma, shortness of breath, and potential for cardiac disease, diabetes, and other underlying medical conditions  problem 9: health maintenance -s/p yearly flu shot 2023 -recommended strep pneumonia vaccines: Prevnar-13 vaccine, followed by Pneumo vaccine 23 one year following after 65 -recommended early intervention for Upper Respiratory Infections (URIs) -recommended regular osteoporosis evaluations -recommended early dermatological evaluations -recommended after the age of 50 to the age of 70, colonoscopy every 5 years  F/P in 4-6 months  She is encouraged to call with any changes, concerns, or questions.

## 2024-04-02 RX ORDER — LEVOCETIRIZINE DIHYDROCHLORIDE 5 MG/1
5 TABLET ORAL
Qty: 1 | Refills: 1 | Status: ACTIVE | COMMUNITY
Start: 2024-04-02 | End: 1900-01-01

## 2024-04-02 RX ORDER — DESLORATADINE 5 MG/1
5 TABLET ORAL
Qty: 90 | Refills: 1 | Status: DISCONTINUED | COMMUNITY
Start: 2024-04-01 | End: 2024-04-02

## 2024-04-20 ENCOUNTER — RX RENEWAL (OUTPATIENT)
Age: 64
End: 2024-04-20

## 2024-04-20 ENCOUNTER — APPOINTMENT (OUTPATIENT)
Dept: CARDIOLOGY | Facility: CLINIC | Age: 64
End: 2024-04-20
Payer: MEDICAID

## 2024-04-20 PROCEDURE — 93306 TTE W/DOPPLER COMPLETE: CPT

## 2024-04-20 RX ORDER — FLUTICASONE FUROATE, UMECLIDINIUM BROMIDE AND VILANTEROL TRIFENATATE 100; 62.5; 25 UG/1; UG/1; UG/1
100-62.5-25 POWDER RESPIRATORY (INHALATION)
Qty: 180 | Refills: 1 | Status: ACTIVE | COMMUNITY
Start: 2023-03-01 | End: 1900-01-01

## 2024-05-20 ENCOUNTER — RX RENEWAL (OUTPATIENT)
Age: 64
End: 2024-05-20

## 2024-05-20 RX ORDER — APIXABAN 5 MG/1
5 TABLET, FILM COATED ORAL
Qty: 60 | Refills: 5 | Status: ACTIVE | COMMUNITY
Start: 2023-10-06 | End: 1900-01-01

## 2024-05-22 ENCOUNTER — APPOINTMENT (OUTPATIENT)
Dept: INTERNAL MEDICINE | Facility: CLINIC | Age: 64
End: 2024-05-22
Payer: MEDICAID

## 2024-05-22 VITALS
HEIGHT: 64 IN | SYSTOLIC BLOOD PRESSURE: 152 MMHG | WEIGHT: 152 LBS | HEART RATE: 69 BPM | TEMPERATURE: 98.1 F | DIASTOLIC BLOOD PRESSURE: 76 MMHG | BODY MASS INDEX: 25.95 KG/M2 | OXYGEN SATURATION: 97 %

## 2024-05-22 DIAGNOSIS — M25.551 PAIN IN RIGHT HIP: ICD-10-CM

## 2024-05-22 DIAGNOSIS — M25.552 PAIN IN RIGHT HIP: ICD-10-CM

## 2024-05-22 DIAGNOSIS — G89.29 PAIN IN RIGHT HIP: ICD-10-CM

## 2024-05-22 DIAGNOSIS — M25.559 PAIN IN UNSPECIFIED HIP: ICD-10-CM

## 2024-05-22 PROCEDURE — G2211 COMPLEX E/M VISIT ADD ON: CPT | Mod: NC,1L

## 2024-05-22 PROCEDURE — 99215 OFFICE O/P EST HI 40 MIN: CPT

## 2024-05-27 NOTE — HEALTH RISK ASSESSMENT
[Good] : ~his/her~  mood as  good [No] : In the past 12 months have you used drugs other than those required for medical reasons? No [No falls in past year] : Patient reported no falls in the past year [0] : 2) Feeling down, depressed, or hopeless: Not at all (0) [PHQ-2 Negative - No further assessment needed] : PHQ-2 Negative - No further assessment needed [Never] : Never [Behavioral] : behavioral [Health Literacy] : health literacy [Transportation] : transportation [Alone] : lives alone [Retired] : retired [# Of Children ___] : has [unfilled] children [Feels Safe at Home] : Feels safe at home [Fully functional (bathing, dressing, toileting, transferring, walking, feeding)] : Fully functional (bathing, dressing, toileting, transferring, walking, feeding) [Fully functional (using the telephone, shopping, preparing meals, housekeeping, doing laundry, using] : Fully functional and needs no help or supervision to perform IADLs (using the telephone, shopping, preparing meals, housekeeping, doing laundry, using transportation, managing medications and managing finances) [de-identified] : no formal exercise [de-identified] : balanced varied diet without restrictions [FDL2Ehusp] : 0 [High Risk Behavior] : no high risk behavior [Reports changes in hearing] : Reports no changes in hearing [Reports changes in vision] : Reports no changes in vision [MammogramDate] : 6/2023 [PapSmearComments] : unknown - will confirm [BoneDensityComments] : will discuss in future  [ColonoscopyComments] : never - will submit referral  [de-identified] : will obtain collateral

## 2024-05-27 NOTE — HISTORY OF PRESENT ILLNESS
[FreeTextEntry1] : Patient presents today for follow-up of chronic medical conditions.  [de-identified] : PMHx - afib, dysphagia, asthma, s/p CVA   Still having persistent dysphagia to solid foods - able to tolerate liquids Waiting to Lupe Mcginnis (daughter) to return from [place] - in order to help set up motiliy testing   Prior -  Poor historian Feels well - some fatigue Lives alone - two daughters who live in [place]  Chart reviewed to elicit medical history  Spoke Lupe Mcginnis - daughter - obtained medical history  Injury to leg due to injury at [place]  Discussed transition to DOAC

## 2024-05-27 NOTE — PLAN
[FreeTextEntry1] : [ ] thyroid US  - repeat 6 months for surveillance of nodule  [ ] colonoscopy - never completed, patient will schedule in our office [ ] hx of CVA in [facility] - will need neurologist   #HTN Patient with history of hypertension.  BP today acceptable Patient to continue current regimen as prescribed. Patient instructed to continue monitoring blood pressure at home and to keep a journal. Will continue to monitor patient's bp and adjust his hypertensive regimen as needed.  #A-fib previously on coumadin  tolerating DOAC as of 12/2023 discussed with cardiologist + patient's daughter - all in agreement  Spoke to patient's daughter (Lupe Mcginnis) at length - no close family in [place] - next of kin would be daughters in [facility] - feel as though patient is safe living alone, no concerns for safety at this time  Feel as though patient's memory may be an issue - but seems to be okay as of now - discussed medications with patient and seems organized with all medication bottles in office today  #Oropharyngeal dysphagia Work-up thus far - esophagram performed on March 14 2023 - GERD with barium tablet impacted at the GE junction. EGD as of August 9, 2023 - negative for eosinophilic esophagitis, stomach normal, duodenal normal. [ ] motility testing pending - will schedule as of 4/2024   ** spoke to Lupe to confirm appt can be made as of 4/2024 - will have office reach to Lupe to schedule   I spent a total of 45 minutes on the date of this encounter, which included: Preparing to see the patient (e.g., review of test results) Obtaining and/or reviewing separately obtained history Performing a medically appropriate exam and/or evaluation Counseling and educating the patient/family/caregiver Ordering medications, tests, procedures Referring and communicating with other healthcare professionals, when not separately reported Documenting clinical information in the health record Care coordination not separately reported

## 2024-06-03 ENCOUNTER — APPOINTMENT (OUTPATIENT)
Dept: RADIOLOGY | Facility: CLINIC | Age: 64
End: 2024-06-03
Payer: MEDICAID

## 2024-06-03 PROCEDURE — 73522 X-RAY EXAM HIPS BI 3-4 VIEWS: CPT

## 2024-06-03 PROCEDURE — 73564 X-RAY EXAM KNEE 4 OR MORE: CPT | Mod: 50

## 2024-06-07 NOTE — ED PROVIDER NOTE - CHIEF COMPLAINT
[de-identified] : PROCEDURE: LEFT ultrasound guided subacromial bursa Injection 06/07/2024  DE[facility]PT[facility] Nov 25 1978  [id number]  Performing Physician: Salty Swift MD Findings: trace effusion was present. There was also some thickening of the subacromial bursa using a 12 mHz ultrasound tranducer Description: Risks, benefits and alternatives to the procedure were discussed with patient. All questions were answered. The findings are above. After consent was obtained, the skin over this area was prepped with chlorhexidine. 1-1.5 mL of 1& Lidocaine was used for local anesthetic. A 25 gauge 1.5 inch needle was then advanced into the right subacromial region in an in-plane, long axis approach under direct ultrasound visualization. 3 mL of 1% Lidocaine and 40 mg of Kenalog was then injected. Excellent flow of fluid was noted in the subacromial space. There was no bleeding or complication noted. The patient tolerated the procedure well and was instructed to use cool compresses for 15 minute intervals PRN.   PROCEDURE: LEFT ultrasound guided acromioclavicular joint steroid Injection 03/26/2024  [facility] Nov 25 1978  [id number]   Performing Physician: Salty Swift MD Hand Dominance: LEFT Findings: trace effusion was present. There was also some thickening of the subacromial bursa using a 12 mHz ultrasound tranducer Description: Risks, benefits and alternatives to the procedure were discussed with patient. All questions were answered. The findings are above. After consent was obtained, the skin over this area was prepped with chlorhexidine. 1-1.5 mL of 1& Lidocaine was used for local anesthetic. A 25 gauge 1.5 inch needle was then advanced into the LEFT subacromial region in an in-plane, long axis approach under direct ultrasound visualization. 5 mL of 1% Lidocaine and 40 mg of Kenalog was then injected. Excellent flow of fluid was noted in the subacromial space. There was no bleeding or complication noted. The patient tolerated the procedure well and was instructed to use cool compresses for 15 minute intervals PRN.
The patient is a 62y Female complaining of

## 2024-06-10 ENCOUNTER — APPOINTMENT (OUTPATIENT)
Dept: INTERNAL MEDICINE | Facility: CLINIC | Age: 64
End: 2024-06-10
Payer: MEDICAID

## 2024-06-10 VITALS
TEMPERATURE: 97.3 F | OXYGEN SATURATION: 99 % | WEIGHT: 150 LBS | SYSTOLIC BLOOD PRESSURE: 170 MMHG | HEIGHT: 64 IN | HEART RATE: 98 BPM | DIASTOLIC BLOOD PRESSURE: 97 MMHG | BODY MASS INDEX: 25.61 KG/M2

## 2024-06-10 DIAGNOSIS — I10 ESSENTIAL (PRIMARY) HYPERTENSION: ICD-10-CM

## 2024-06-10 DIAGNOSIS — R26.81 UNSTEADINESS ON FEET: ICD-10-CM

## 2024-06-10 DIAGNOSIS — J45.909 UNSPECIFIED ASTHMA, UNCOMPLICATED: ICD-10-CM

## 2024-06-10 DIAGNOSIS — J98.4 EMPHYSEMA, UNSPECIFIED: ICD-10-CM

## 2024-06-10 DIAGNOSIS — J43.9 EMPHYSEMA, UNSPECIFIED: ICD-10-CM

## 2024-06-10 LAB
ALBUMIN SERPL ELPH-MCNC: 4.1 G/DL
ALP BLD-CCNC: 94 U/L
ALT SERPL-CCNC: 15 U/L
ANION GAP SERPL CALC-SCNC: 11 MMOL/L
AST SERPL-CCNC: 22 U/L
BASOPHILS # BLD AUTO: 0.02 K/UL
BASOPHILS NFR BLD AUTO: 0.5 %
BILIRUB SERPL-MCNC: 0.4 MG/DL
BUN SERPL-MCNC: 12 MG/DL
CALCIUM SERPL-MCNC: 9.2 MG/DL
CHLORIDE SERPL-SCNC: 109 MMOL/L
CHOLEST SERPL-MCNC: 158 MG/DL
CO2 SERPL-SCNC: 22 MMOL/L
CREAT SERPL-MCNC: 0.73 MG/DL
EGFR: 92 ML/MIN/1.73M2
EOSINOPHIL # BLD AUTO: 0.28 K/UL
EOSINOPHIL NFR BLD AUTO: 6.6 %
GLUCOSE SERPL-MCNC: 89 MG/DL
HCT VFR BLD CALC: 35 %
HDLC SERPL-MCNC: 68 MG/DL
HGB BLD-MCNC: 11.7 G/DL
IMM GRANULOCYTES NFR BLD AUTO: 0.2 %
LDLC SERPL CALC-MCNC: 82 MG/DL
LYMPHOCYTES # BLD AUTO: 1.72 K/UL
LYMPHOCYTES NFR BLD AUTO: 40.4 %
MAN DIFF?: NORMAL
MCHC RBC-ENTMCNC: 27.9 PG
MCHC RBC-ENTMCNC: 33.4 GM/DL
MCV RBC AUTO: 83.3 FL
MONOCYTES # BLD AUTO: 0.36 K/UL
MONOCYTES NFR BLD AUTO: 8.5 %
NEUTROPHILS # BLD AUTO: 1.87 K/UL
NEUTROPHILS NFR BLD AUTO: 43.8 %
NONHDLC SERPL-MCNC: 90 MG/DL
PLATELET # BLD AUTO: 201 K/UL
POTASSIUM SERPL-SCNC: 4.1 MMOL/L
PROT SERPL-MCNC: 6.8 G/DL
RBC # BLD: 4.2 M/UL
RBC # FLD: 14.6 %
SODIUM SERPL-SCNC: 142 MMOL/L
T4 FREE SERPL-MCNC: 1.1 NG/DL
TRIGL SERPL-MCNC: 31 MG/DL
TSH SERPL-ACNC: 1.29 UIU/ML
WBC # FLD AUTO: 4.26 K/UL

## 2024-06-10 PROCEDURE — 99214 OFFICE O/P EST MOD 30 MIN: CPT

## 2024-06-10 PROCEDURE — G2211 COMPLEX E/M VISIT ADD ON: CPT | Mod: NC,1L

## 2024-06-10 RX ORDER — MELOXICAM 7.5 MG/1
7.5 TABLET ORAL
Qty: 60 | Refills: 0 | Status: ACTIVE | COMMUNITY
Start: 2024-06-10 | End: 1900-01-01

## 2024-06-10 RX ORDER — CELECOXIB 50 MG/1
50 CAPSULE ORAL
Qty: 30 | Refills: 0 | Status: DISCONTINUED | COMMUNITY
Start: 2024-05-22 | End: 2024-06-10

## 2024-06-10 RX ORDER — DICLOFENAC SODIUM 1% 10 MG/G
1 GEL TOPICAL
Qty: 1 | Refills: 3 | Status: ACTIVE | COMMUNITY
Start: 2024-06-10 | End: 1900-01-01

## 2024-06-10 NOTE — PLAN
[FreeTextEntry1] : [ ] thyroid US  - repeat 6 months for surveillance of nodule  [ ] colonoscopy - never completed, patient will schedule in our office [ ] hx of CVA in [facility] - will need neurologist   #HTN Patient with history of hypertension.  BP today acceptable Patient to continue current regimen as prescribed. Patient instructed to continue monitoring blood pressure at home and to keep a journal. Will continue to monitor patient's bp and adjust his hypertensive regimen as needed.  #A-fib previously on coumadin  tolerating DOAC as of 12/2023 discussed with cardiologist + patient's daughter - all in agreement  Spoke to patient's daughter (Lupe Mcginnis) at length - no close family in [place] - next of kin would be daughters in [facility] - feel as though patient is safe living alone, no concerns for safety at this time  Feel as though patient's memory may be an issue - but seems to be okay as of now - discussed medications with patient and seems organized with all medication bottles in office today  #Oropharyngeal dysphagia Work-up thus far - esophagram performed on March 14 2023 - GERD with barium tablet impacted at the GE junction. EGD as of August 9, 2023 - negative for eosinophilic esophagitis, stomach normal, duodenal normal. [ ] motility testing pending - will schedule as of 4/2024   ** spoke to Lupe to confirm appt can be made as of 4/2024 - will have office reach to Lupe to schedule   I spent a total of 45 minutes on the date of this encounter, which included: Preparing to see the patient (e.g., review of test results) Obtaining and/or reviewing separately obtained history Performing a medically appropriate exam and/or evaluation Counseling and educating the patient/family/caregiver Ordering medications, tests, procedures Referring and communicating with other healthcare professionals, when not separately reported Documenting clinical information in the health record Care coordination not separately reported

## 2024-06-10 NOTE — HISTORY OF PRESENT ILLNESS
[FreeTextEntry1] : Patient presents today for follow-up of chronic medical conditions.  [de-identified] : PMHx - afib, dysphagia, asthma, s/p CVA   6/2024:  Still having L hip pain - has been ongoing for months Worse with ambulation  Tried celebrex - did not work Xray reviewed - unremarkable  Prior -  Still having persistent dysphagia to solid foods - able to tolerate liquids Waiting to Lupe Mcginnis (daughter) to return from [place] - in order to help set up motiliy testing   Prior -  Poor historian Feels well - some fatigue Lives alone - two daughters who live in [place]  Chart reviewed to elicit medical history  Spoke Lupe Mcginnis - daughter - obtained medical history  Injury to leg due to injury at [place]  Discussed transition to DOAC

## 2024-06-10 NOTE — HEALTH RISK ASSESSMENT
[Good] : ~his/her~  mood as  good [No] : In the past 12 months have you used drugs other than those required for medical reasons? No [No falls in past year] : Patient reported no falls in the past year [0] : 2) Feeling down, depressed, or hopeless: Not at all (0) [PHQ-2 Negative - No further assessment needed] : PHQ-2 Negative - No further assessment needed [Never] : Never [Behavioral] : behavioral [Health Literacy] : health literacy [Transportation] : transportation [Alone] : lives alone [Retired] : retired [# Of Children ___] : has [unfilled] children [Feels Safe at Home] : Feels safe at home [Fully functional (bathing, dressing, toileting, transferring, walking, feeding)] : Fully functional (bathing, dressing, toileting, transferring, walking, feeding) [Fully functional (using the telephone, shopping, preparing meals, housekeeping, doing laundry, using] : Fully functional and needs no help or supervision to perform IADLs (using the telephone, shopping, preparing meals, housekeeping, doing laundry, using transportation, managing medications and managing finances) [de-identified] : no formal exercise [de-identified] : balanced varied diet without restrictions [SOC5Siguv] : 0 [High Risk Behavior] : no high risk behavior [Reports changes in hearing] : Reports no changes in hearing [Reports changes in vision] : Reports no changes in vision [MammogramDate] : 6/2023 [PapSmearComments] : unknown - will confirm [BoneDensityComments] : will discuss in future  [ColonoscopyComments] : never - will submit referral  [de-identified] : will obtain collateral

## 2024-06-11 DIAGNOSIS — R41.3 OTHER AMNESIA: ICD-10-CM

## 2024-06-11 DIAGNOSIS — R13.10 DYSPHAGIA, UNSPECIFIED: ICD-10-CM

## 2024-06-11 DIAGNOSIS — I63.9 CEREBRAL INFARCTION, UNSPECIFIED: ICD-10-CM

## 2024-06-13 ENCOUNTER — NON-APPOINTMENT (OUTPATIENT)
Age: 64
End: 2024-06-13

## 2024-06-13 ENCOUNTER — APPOINTMENT (OUTPATIENT)
Dept: CARDIOLOGY | Facility: CLINIC | Age: 64
End: 2024-06-13
Payer: MEDICAID

## 2024-06-13 ENCOUNTER — APPOINTMENT (OUTPATIENT)
Dept: RADIOLOGY | Facility: CLINIC | Age: 64
End: 2024-06-13
Payer: MEDICAID

## 2024-06-13 VITALS
HEART RATE: 67 BPM | HEIGHT: 64 IN | BODY MASS INDEX: 25.61 KG/M2 | WEIGHT: 150 LBS | OXYGEN SATURATION: 99 % | DIASTOLIC BLOOD PRESSURE: 100 MMHG | SYSTOLIC BLOOD PRESSURE: 171 MMHG

## 2024-06-13 DIAGNOSIS — I34.0 NONRHEUMATIC MITRAL (VALVE) INSUFFICIENCY: ICD-10-CM

## 2024-06-13 DIAGNOSIS — I48.91 UNSPECIFIED ATRIAL FIBRILLATION: ICD-10-CM

## 2024-06-13 DIAGNOSIS — I27.20 PULMONARY HYPERTENSION, UNSPECIFIED: ICD-10-CM

## 2024-06-13 PROCEDURE — 99204 OFFICE O/P NEW MOD 45 MIN: CPT | Mod: 25

## 2024-06-13 PROCEDURE — 77080 DXA BONE DENSITY AXIAL: CPT

## 2024-06-13 PROCEDURE — G2211 COMPLEX E/M VISIT ADD ON: CPT | Mod: NC,1L

## 2024-06-13 PROCEDURE — 93000 ELECTROCARDIOGRAM COMPLETE: CPT

## 2024-06-13 RX ORDER — CELECOXIB 50 MG/1
50 CAPSULE ORAL
Refills: 0 | Status: ACTIVE | COMMUNITY

## 2024-06-19 PROBLEM — I27.20 PULMONARY HYPERTENSION: Status: ACTIVE | Noted: 2022-10-26

## 2024-06-19 PROBLEM — I48.91 A-FIB: Status: ACTIVE | Noted: 2022-04-15

## 2024-06-19 PROBLEM — I34.0 MITRAL REGURGITATION: Status: ACTIVE | Noted: 2022-10-26

## 2024-06-19 NOTE — HISTORY OF PRESENT ILLNESS
[FreeTextEntry1] : Occasional feeling of wheezing. Denies shortness of breath. Denies chest pain or palpitations.

## 2024-06-19 NOTE — CARDIOLOGY SUMMARY
[de-identified] : 06/13/24 - atrial fibrillation, 71 bpm, poor R-wave progression [de-identified] : 04/20/24 - severe MR, severe LAE, normal LV size and function, mild RVE with normal RV systolic function, severe TR, severe CARLEEN, PASP 49 mmHg, LVEF 50-55% 11/23/22 - mitral valve prolapse involving the anterior mitral leaflet, mild MR, peak AV gradient 16 mmHg, severe LAE, normal LV and RV size and function, mod TR, severe CARLEEN, RVSP 44 mmHg, LVEF 55-60%

## 2024-06-19 NOTE — REVIEW OF SYSTEMS
[Cough] : no cough [Wheezing] : wheezing [Coughing Up Blood] : no hemoptysis [Snoring] : no snoring [Negative] : Musculoskeletal [FreeTextEntry6] : see HPI

## 2024-06-19 NOTE — DISCUSSION/SUMMARY
[Atrial Fibrillation] : atrial fibrillation [Controlled Ventricular Response] : controlled ventricular response [Severe Mitral Regurgitation] : severe mitral regurgitation [Compensated] : compensated [Stable] : stable [FreeTextEntry1] : Currently stable from a cardiovascular standpoint. Hypertensive today. Appears euvolemic. Echo results from April reviewed. Patient with severe MR and severe TR with preserved LV systolic function. Pulmonary hypertension likely secondary to valvular heart disease. Unclear etiology of wheezing complaints but consider pulmonary vascular congestion. Rate-controlled atrial fibrillation. Continue current medications including apixaban. ECG completed today and reviewed (findings as noted above). Will schedule patient for right and left heart cath for further evaluation of severe mitral regurgitation. MR likely secondary to mitral valve prolapse. [EKG obtained to assist in diagnosis and management of assessed problem(s)] : EKG obtained to assist in diagnosis and management of assessed problem(s)

## 2024-06-25 ENCOUNTER — APPOINTMENT (OUTPATIENT)
Dept: GASTROENTEROLOGY | Facility: CLINIC | Age: 64
End: 2024-06-25

## 2024-06-25 VITALS
BODY MASS INDEX: 25.44 KG/M2 | WEIGHT: 149 LBS | HEART RATE: 63 BPM | DIASTOLIC BLOOD PRESSURE: 84 MMHG | HEIGHT: 64 IN | OXYGEN SATURATION: 97 % | SYSTOLIC BLOOD PRESSURE: 148 MMHG

## 2024-06-25 DIAGNOSIS — R13.12 DYSPHAGIA, OROPHARYNGEAL PHASE: ICD-10-CM

## 2024-06-25 PROCEDURE — 99215 OFFICE O/P EST HI 40 MIN: CPT

## 2024-06-27 ENCOUNTER — RX RENEWAL (OUTPATIENT)
Age: 64
End: 2024-06-27

## 2024-06-28 ENCOUNTER — APPOINTMENT (OUTPATIENT)
Dept: RHEUMATOLOGY | Facility: CLINIC | Age: 64
End: 2024-06-28
Payer: MEDICAID

## 2024-06-28 DIAGNOSIS — M85.80 OTHER SPECIFIED DISORDERS OF BONE DENSITY AND STRUCTURE, UNSPECIFIED SITE: ICD-10-CM

## 2024-06-28 PROCEDURE — 99442: CPT

## 2024-07-03 ENCOUNTER — TRANSCRIPTION ENCOUNTER (OUTPATIENT)
Age: 64
End: 2024-07-03

## 2024-07-03 ENCOUNTER — OUTPATIENT (OUTPATIENT)
Dept: OUTPATIENT SERVICES | Facility: HOSPITAL | Age: 64
LOS: 1 days | Discharge: HOME | End: 2024-07-03
Payer: MEDICAID

## 2024-07-03 VITALS
OXYGEN SATURATION: 100 % | HEART RATE: 61 BPM | SYSTOLIC BLOOD PRESSURE: 151 MMHG | RESPIRATION RATE: 16 BRPM | DIASTOLIC BLOOD PRESSURE: 82 MMHG

## 2024-07-03 VITALS
OXYGEN SATURATION: 100 % | TEMPERATURE: 98 F | DIASTOLIC BLOOD PRESSURE: 83 MMHG | SYSTOLIC BLOOD PRESSURE: 146 MMHG | RESPIRATION RATE: 16 BRPM | WEIGHT: 149.91 LBS | HEART RATE: 74 BPM | HEIGHT: 65 IN

## 2024-07-03 DIAGNOSIS — I34.9 NONRHEUMATIC MITRAL VALVE DISORDER, UNSPECIFIED: ICD-10-CM

## 2024-07-03 LAB
ANION GAP SERPL CALC-SCNC: 12 MMOL/L — SIGNIFICANT CHANGE UP (ref 7–14)
BUN SERPL-MCNC: 10 MG/DL — SIGNIFICANT CHANGE UP (ref 7–23)
CALCIUM SERPL-MCNC: 9.2 MG/DL — SIGNIFICANT CHANGE UP (ref 8.4–10.5)
CHLORIDE SERPL-SCNC: 110 MMOL/L — HIGH (ref 98–107)
CO2 SERPL-SCNC: 23 MMOL/L — SIGNIFICANT CHANGE UP (ref 22–31)
CREAT SERPL-MCNC: 0.73 MG/DL — SIGNIFICANT CHANGE UP (ref 0.5–1.3)
EGFR: 92 ML/MIN/1.73M2 — SIGNIFICANT CHANGE UP
GLUCOSE SERPL-MCNC: 96 MG/DL — SIGNIFICANT CHANGE UP (ref 70–99)
HCT VFR BLD CALC: 35.3 % — SIGNIFICANT CHANGE UP (ref 34.5–45)
HGB BLD-MCNC: 11.8 G/DL — SIGNIFICANT CHANGE UP (ref 11.5–15.5)
MCHC RBC-ENTMCNC: 27.6 PG — SIGNIFICANT CHANGE UP (ref 27–34)
MCHC RBC-ENTMCNC: 33.4 GM/DL — SIGNIFICANT CHANGE UP (ref 32–36)
MCV RBC AUTO: 82.5 FL — SIGNIFICANT CHANGE UP (ref 80–100)
NRBC # BLD: 0 /100 WBCS — SIGNIFICANT CHANGE UP (ref 0–0)
NRBC # FLD: 0 K/UL — SIGNIFICANT CHANGE UP (ref 0–0)
PLATELET # BLD AUTO: 215 K/UL — SIGNIFICANT CHANGE UP (ref 150–400)
POTASSIUM SERPL-MCNC: 3.7 MMOL/L — SIGNIFICANT CHANGE UP (ref 3.5–5.3)
POTASSIUM SERPL-SCNC: 3.7 MMOL/L — SIGNIFICANT CHANGE UP (ref 3.5–5.3)
RBC # BLD: 4.28 M/UL — SIGNIFICANT CHANGE UP (ref 3.8–5.2)
RBC # FLD: 14 % — SIGNIFICANT CHANGE UP (ref 10.3–14.5)
SODIUM SERPL-SCNC: 145 MMOL/L — SIGNIFICANT CHANGE UP (ref 135–145)
WBC # BLD: 4.48 K/UL — SIGNIFICANT CHANGE UP (ref 3.8–10.5)
WBC # FLD AUTO: 4.48 K/UL — SIGNIFICANT CHANGE UP (ref 3.8–10.5)

## 2024-07-03 PROCEDURE — 93456 R HRT CORONARY ARTERY ANGIO: CPT | Mod: 26

## 2024-07-03 PROCEDURE — 93010 ELECTROCARDIOGRAM REPORT: CPT

## 2024-07-03 RX ORDER — ALBUTEROL 90 MCG
2 AEROSOL REFILL (GRAM) INHALATION
Refills: 0 | DISCHARGE

## 2024-07-03 RX ORDER — SODIUM CHLORIDE 0.9 % (FLUSH) 0.9 %
3 SYRINGE (ML) INJECTION EVERY 8 HOURS
Refills: 0 | Status: DISCONTINUED | OUTPATIENT
Start: 2024-07-03 | End: 2024-07-17

## 2024-07-03 RX ORDER — MELOXICAM 7.5 MG/1
1 TABLET ORAL
Refills: 0 | DISCHARGE

## 2024-07-03 RX ORDER — APIXABAN 5 MG/1
1 TABLET, FILM COATED ORAL
Qty: 0 | Refills: 0 | DISCHARGE

## 2024-07-05 LAB
HCT VFR BLDA CALC: 33 % — SIGNIFICANT CHANGE UP
HCT VFR BLDA CALC: 36 % — SIGNIFICANT CHANGE UP
HGB BLD CALC-MCNC: 11 G/DL — LOW (ref 11.7–16.1)
HGB BLDA-MCNC: 11.9 G/DL — SIGNIFICANT CHANGE UP (ref 11.7–16.1)
SAO2 % BLDA: 98.9 % — HIGH (ref 94–98)
SAO2 % BLDV: 72 % — SIGNIFICANT CHANGE UP (ref 67–88)

## 2024-07-23 ENCOUNTER — RESULT REVIEW (OUTPATIENT)
Age: 64
End: 2024-07-23

## 2024-07-25 PROBLEM — I34.0 NONRHEUMATIC MITRAL (VALVE) INSUFFICIENCY: Chronic | Status: ACTIVE | Noted: 2024-07-03

## 2024-07-26 ENCOUNTER — TRANSCRIPTION ENCOUNTER (OUTPATIENT)
Age: 64
End: 2024-07-26

## 2024-07-29 ENCOUNTER — APPOINTMENT (OUTPATIENT)
Dept: INTERNAL MEDICINE | Facility: CLINIC | Age: 64
End: 2024-07-29
Payer: MEDICAID

## 2024-07-29 ENCOUNTER — NON-APPOINTMENT (OUTPATIENT)
Age: 64
End: 2024-07-29

## 2024-07-29 VITALS
DIASTOLIC BLOOD PRESSURE: 81 MMHG | BODY MASS INDEX: 25.27 KG/M2 | OXYGEN SATURATION: 99 % | SYSTOLIC BLOOD PRESSURE: 136 MMHG | TEMPERATURE: 98.3 F | HEIGHT: 64 IN | WEIGHT: 148 LBS | HEART RATE: 69 BPM

## 2024-07-29 PROCEDURE — 99496 TRANSJ CARE MGMT HIGH F2F 7D: CPT

## 2024-07-29 PROCEDURE — 99495 TRANSJ CARE MGMT MOD F2F 14D: CPT

## 2024-07-29 RX ORDER — DAPAGLIFLOZIN 10 MG/1
10 TABLET, FILM COATED ORAL
Qty: 90 | Refills: 2 | Status: ACTIVE | COMMUNITY
Start: 2024-07-29 | End: 1900-01-01

## 2024-07-29 NOTE — ASSESSMENT
[FreeTextEntry1] : Patient poor historian.  Medication reconciliation performed - was taking meloxicam instead of entresto.   Medications reviewed in detail with patient - new medication list and refills provided.  Heart failure follow-up scheduled 8/8 at 10:30 AM.

## 2024-07-29 NOTE — HISTORY OF PRESENT ILLNESS
[Post-hospitalization from ___ Hospital] : Post-hospitalization from [unfilled] Hospital [Admitted on: ___] : The patient was admitted on [unfilled] [Discharged on ___] : discharged on [unfilled] [Discharge Summary] : discharge summary [Pertinent Labs] : pertinent labs [Discharge Med List] : discharge medication list [Patient Contacted By: ____] : and contacted by [unfilled] [FreeTextEntry2] : Patient hospitalized for CISNEROS - due to CHF in setting of severe MR and TR. Patient hospitalized and evaluated by cardiology - ultimately recommended to be medically optimized for GDMT.   Since discharge patient feels well - denies SOB/chest pain.

## 2024-07-29 NOTE — PHYSICAL EXAM
[Normal] : normal rate, regular rhythm, normal S1 and S2 and no murmur heard [73921 - High Complexity requires an extensive number of possible diagnoses and/or the management options, extensive complexity of the medical data (tests, etc.) to be reviewed, and a high risk of significant complications, morbidity, and/or mortality as w] : High Complexity

## 2024-08-08 ENCOUNTER — APPOINTMENT (OUTPATIENT)
Dept: HEART FAILURE | Facility: CLINIC | Age: 64
End: 2024-08-08

## 2024-08-08 ENCOUNTER — NON-APPOINTMENT (OUTPATIENT)
Age: 64
End: 2024-08-08

## 2024-08-08 PROBLEM — I07.1 TRICUSPID REGURGITATION: Status: ACTIVE | Noted: 2024-08-08

## 2024-08-08 PROCEDURE — 99214 OFFICE O/P EST MOD 30 MIN: CPT

## 2024-08-09 ENCOUNTER — NON-APPOINTMENT (OUTPATIENT)
Age: 64
End: 2024-08-09

## 2024-08-09 ENCOUNTER — RX RENEWAL (OUTPATIENT)
Age: 64
End: 2024-08-09

## 2024-08-09 RX ORDER — FUROSEMIDE 20 MG/1
20 TABLET ORAL
Qty: 90 | Refills: 0 | Status: ACTIVE | COMMUNITY
Start: 2024-07-29

## 2024-08-09 RX ORDER — SPIRONOLACTONE 25 MG/1
25 TABLET ORAL
Qty: 90 | Refills: 3 | Status: ACTIVE | COMMUNITY
Start: 2024-07-29

## 2024-08-09 RX ORDER — SACUBITRIL AND VALSARTAN 49; 51 MG/1; MG/1
49-51 TABLET, FILM COATED ORAL TWICE DAILY
Qty: 180 | Refills: 3 | Status: ACTIVE | COMMUNITY
Start: 2024-07-29 | End: 1900-01-01

## 2024-08-12 NOTE — ASSESSMENT
[FreeTextEntry1] : Overall, Ms. Salvador is a 64 year old female with PMHx of HFpEF, Mod MR/Severe TR, Afib on Eliquis, HTN, CVA ([facility]), Asthma (Dr. Curtis), chronic left hip pain, and dysphagia (GI Dr. Espinal)  Recent RHC 7/3/24 with very mildly elevated filling pressures and a preserved output, notably quite hypertensive on evaluation. recently admitted 7/23-26 for ADHF presenting for follow up. She endorses NYHA class II-III symptoms, appears euvolemic on exam however is borderline hypertensive at home, currently tolerating some GDMT with room for escalation.     #HFpEF -GDMT: Increase Entresto 24-26mg to 49-51mg BID pending lab work. Continue Farxiga 10mg, Spironolactone 25mg Daily.  -Diuretic: Furosemide 20mg prn once Entresto increased to moderate dosing  -Check labs today  -Recommend stopping all NSAIDs, educated on negative cardiac effects, patient and sister understand   #Atrial fibrillation  -continue Metoprolol 25mg -Continue Eliquis 5mg BID -Follow up with Dr. Jackson   #Severe tricuspid regurgitation  -Structural follow up 9/12/24  -CTS consulted, awaiting recs -consider repeat TTE to reassess severity with med optimization   #Hypertension  -improving, plan to increase Entresto as above pending lab results   Follow up in 2-3 weeks in NP clinic and next available with Dr. Major.

## 2024-08-12 NOTE — PHYSICAL EXAM
[Well Developed] : well developed [Well Nourished] : well nourished [Normal Conjunctiva] : normal conjunctiva [Normal S1, S2] : normal S1, S2 [Clear Lung Fields] : clear lung fields [Good Air Entry] : good air entry [No Respiratory Distress] : no respiratory distress  [Soft] : abdomen soft [Non Tender] : non-tender [No Edema] : no edema [Moves all extremities] : moves all extremities [Alert and Oriented] : alert and oriented [de-identified] : JVD 6-8cm H2O, negative HJR [de-identified] : III/IV Systolic Murmur  [de-identified] : warm peripherally

## 2024-08-12 NOTE — PHYSICAL EXAM
[Well Developed] : well developed [Well Nourished] : well nourished [Normal Conjunctiva] : normal conjunctiva [Normal S1, S2] : normal S1, S2 [Clear Lung Fields] : clear lung fields [Good Air Entry] : good air entry [No Respiratory Distress] : no respiratory distress  [Soft] : abdomen soft [Non Tender] : non-tender [No Edema] : no edema [Moves all extremities] : moves all extremities [Alert and Oriented] : alert and oriented [de-identified] : JVD 6-8cm H2O, negative HJR [de-identified] : III/IV Systolic Murmur  [de-identified] : warm peripherally

## 2024-08-12 NOTE — HISTORY OF PRESENT ILLNESS
[FreeTextEntry1] : 65 yo female with hx of HFpEF, Severe MR/TR, Afib on eliquis, HTN, CVA ([facility]), Asthma (Dr. Curtis), chronic left hip pain, and dysphagia (GI Dr. Espinal) presenting for post-hospital follow up.   She was admitted to [facility] 7/23/24-7/26/2024 for ADHF. She received IV lasix, TTE on 7/23/24 revealed LVEF 57% with moderate MR and severe TR. She was seen by Structural Dr. Jacobs who recommended medical optimization and deferring transcatheter valve interventions. She underwent cardiac MRI which revealed Normal LV size, EF 55%, no evidence of LGE. Labs on discharge: BUN/Cr 10/0.82, K 4.5, ProBNP 158. She was discharged on Entresto 24-26mg BID, Farxiga 10mg, Spironolactone 12.5mg and Furosemide 20mg with a discharge weight of 140.6lbs (from 149 on admission).    Today she presents for follow up accompanied by her Sister, Lizz. She is feeling greatly improved post-discharge, walking for periods of 30min-1hr without shortness of breath or needing to take breaks. She reports hip pain is improving with physical therapy. She has been taking meloxicam as needed for pain. She has been taking all medications, was recommended to begin Spironolactone 12.5mg on discharge but has been taking 25mg due to difficulty cutting the tablets. She has been measuring her blood pressures at home with SBPs in the 130s. Her weight has been around 145lbs at home. She denies CP, SOB at rest, orthopnea, PND, LH/dizziness, abdominal discomfort, palpitations, and syncope.

## 2024-08-12 NOTE — ASSESSMENT
[FreeTextEntry1] : Overall, Ms. Salvador is a 64 year old female with PMHx of HFpEF, Mod MR/Severe TR, Afib on Eliquis, HTN, CVA ([facility]), Asthma (Dr. Curtis), chronic left hip pain, and dysphagia (GI Dr. Espinal)  Recent RHC 7/3/24 with very mildly elevated filling pressures and a preserved output, notably quite hypertensive on evaluation. recently admitted 7/23-26 for ADHF presenting for follow up. She endorses NYHA class II-III symptoms, appears euvolemic on exam however is borderline hypertensive at home, currently tolerating some GDMT with room for escalation.     #HFpEF -GDMT: Increase Entresto 24-26mg to 49-51mg BID pending lab work. Continue Farxiga 10mg, Spironolactone 25mg Daily.  -Diuretic: Furosemide 20mg prn once Entresto increased to moderate dosing  -Check labs today  -Recommend stopping all NSAIDs, educated on negative cardiac effects, patient and sister understand   #Atrial fibrillation  -continue Metoprolol 25mg -Continue Eliquis 5mg BID -Follow up with Dr. Jackson   #Severe tricuspid regurgitation  -Structural follow up 9/12/24  -CTS consulted, awaiting recs -consider repeat TTE to reassess severity with med optimization   #Hypertension  -improving, plan to increase Entresto as above pending lab results   Follow up in 2-3 weeks in NP clinic and next available with Dr. Major.

## 2024-08-12 NOTE — CARDIOLOGY SUMMARY
[de-identified] : TTE 7/23/24: LVIDd 5.1cm, LVEF 57%, normal RV size and function, Mod RA dilation, Severe LA dilation, Trace AR, Moderate MR, Severe TR, PASP 49mmHg    TTE 11/23/2022: LVIDd 4.6cm, LVEF 55-60%, Normal RV size and function, Severe Biatrial enlargement, MVP involving anterior leaflet with Mild MR, Moderate TR, PASP 44mmHg  [de-identified] : cMRI 7/25/24: LVEF 55%, normal RV size, no ventricular LGE.  [de-identified] : L/RHC 7/3/24: normal coronaries. RA 12, RV 35/13, PA 34/15/22, PCWP 15, PA sat 72%, Ao Sat 99A%, CO 4.2/CI 2.4, SVR 2208, PVR 1.67 SHERIDAN, Ao 194/74 HR 51

## 2024-08-12 NOTE — HISTORY OF PRESENT ILLNESS
[FreeTextEntry1] : 63 yo female with hx of HFpEF, Severe MR/TR, Afib on eliquis, HTN, CVA ([facility]), Asthma (Dr. Curtis), chronic left hip pain, and dysphagia (GI Dr. Espinal) presenting for post-hospital follow up.   She was admitted to [facility] 7/23/24-7/26/2024 for ADHF. She received IV lasix, TTE on 7/23/24 revealed LVEF 57% with moderate MR and severe TR. She was seen by Structural Dr. Jacobs who recommended medical optimization and deferring transcatheter valve interventions. She underwent cardiac MRI which revealed Normal LV size, EF 55%, no evidence of LGE. Labs on discharge: BUN/Cr 10/0.82, K 4.5, ProBNP 158. She was discharged on Entresto 24-26mg BID, Farxiga 10mg, Spironolactone 12.5mg and Furosemide 20mg with a discharge weight of 140.6lbs (from 149 on admission).    Today she presents for follow up accompanied by her Sister, Lizz. She is feeling greatly improved post-discharge, walking for periods of 30min-1hr without shortness of breath or needing to take breaks. She reports hip pain is improving with physical therapy. She has been taking meloxicam as needed for pain. She has been taking all medications, was recommended to begin Spironolactone 12.5mg on discharge but has been taking 25mg due to difficulty cutting the tablets. She has been measuring her blood pressures at home with SBPs in the 130s. Her weight has been around 145lbs at home. She denies CP, SOB at rest, orthopnea, PND, LH/dizziness, abdominal discomfort, palpitations, and syncope.

## 2024-08-12 NOTE — CARDIOLOGY SUMMARY
[de-identified] : TTE 7/23/24: LVIDd 5.1cm, LVEF 57%, normal RV size and function, Mod RA dilation, Severe LA dilation, Trace AR, Moderate MR, Severe TR, PASP 49mmHg    TTE 11/23/2022: LVIDd 4.6cm, LVEF 55-60%, Normal RV size and function, Severe Biatrial enlargement, MVP involving anterior leaflet with Mild MR, Moderate TR, PASP 44mmHg  [de-identified] : cMRI 7/25/24: LVEF 55%, normal RV size, no ventricular LGE.  [de-identified] : L/RHC 7/3/24: normal coronaries. RA 12, RV 35/13, PA 34/15/22, PCWP 15, PA sat 72%, Ao Sat 99A%, CO 4.2/CI 2.4, SVR 2208, PVR 1.67 SHERIDAN, Ao 194/74 HR 51

## 2024-08-22 ENCOUNTER — APPOINTMENT (OUTPATIENT)
Dept: HEART FAILURE | Facility: CLINIC | Age: 64
End: 2024-08-22
Payer: MEDICAID

## 2024-08-22 VITALS
BODY MASS INDEX: 24.24 KG/M2 | SYSTOLIC BLOOD PRESSURE: 111 MMHG | DIASTOLIC BLOOD PRESSURE: 78 MMHG | OXYGEN SATURATION: 98 % | TEMPERATURE: 98 F | HEART RATE: 65 BPM | WEIGHT: 142 LBS | HEIGHT: 64 IN

## 2024-08-22 DIAGNOSIS — I50.30 UNSPECIFIED DIASTOLIC (CONGESTIVE) HEART FAILURE: ICD-10-CM

## 2024-08-22 PROCEDURE — 99214 OFFICE O/P EST MOD 30 MIN: CPT

## 2024-08-26 LAB
ANION GAP SERPL CALC-SCNC: 13 MMOL/L
BUN SERPL-MCNC: 10 MG/DL
CALCIUM SERPL-MCNC: 9.5 MG/DL
CHLORIDE SERPL-SCNC: 105 MMOL/L
CO2 SERPL-SCNC: 24 MMOL/L
CREAT SERPL-MCNC: 0.82 MG/DL
EGFR: 80 ML/MIN/1.73M2
GLUCOSE SERPL-MCNC: 96 MG/DL
NT-PROBNP SERPL-MCNC: 275 PG/ML
POTASSIUM SERPL-SCNC: 4.1 MMOL/L
SODIUM SERPL-SCNC: 142 MMOL/L

## 2024-08-26 NOTE — CARDIOLOGY SUMMARY
[de-identified] : TTE 7/23/24: LVIDd 5.1cm, LVEF 57%, normal RV size and function, Mod RA dilation, Severe LA dilation, Trace AR, Moderate MR, Severe TR, PASP 49mmHg    TTE 11/23/2022: LVIDd 4.6cm, LVEF 55-60%, Normal RV size and function, Severe Biatrial enlargement, MVP involving anterior leaflet with Mild MR, Moderate TR, PASP 44mmHg  [de-identified] : cMRI 7/25/24: LVEF 55%, normal RV size, no ventricular LGE.  [de-identified] : L/RHC 7/3/24: normal coronaries. RA 12, RV 35/13, PA 34/15/22, PCWP 15, PA sat 72%, Ao Sat 99A%, CO 4.2/CI 2.4, SVR 2208, PVR 1.67 SHERIDAN, Ao 194/74 HR 51

## 2024-08-26 NOTE — PHYSICAL EXAM
[Normal Conjunctiva] : normal conjunctiva [Soft] : abdomen soft [Non Tender] : non-tender [Normal Venous Pressure] : normal venous pressure [No Rub] : no rub [No Gallop] : no gallop [Normal Bowel Sounds] : normal bowel sounds [Normal Radial B/L] : normal radial B/L [Normal] : alert and oriented, normal memory [de-identified] : No perioral cyanosis, MMM  [de-identified] : JVP 6-8 cm H2O, no HJR [de-identified] : III/IV Systolic Murmur  [de-identified] : Irregularly irregular [de-identified] : Warm peripherally

## 2024-08-26 NOTE — CARDIOLOGY SUMMARY
[de-identified] : TTE 7/23/24: LVIDd 5.1cm, LVEF 57%, normal RV size and function, Mod RA dilation, Severe LA dilation, Trace AR, Moderate MR, Severe TR, PASP 49mmHg    TTE 11/23/2022: LVIDd 4.6cm, LVEF 55-60%, Normal RV size and function, Severe Biatrial enlargement, MVP involving anterior leaflet with Mild MR, Moderate TR, PASP 44mmHg  [de-identified] : cMRI 7/25/24: LVEF 55%, normal RV size, no ventricular LGE.  [de-identified] : L/RHC 7/3/24: normal coronaries. RA 12, RV 35/13, PA 34/15/22, PCWP 15, PA sat 72%, Ao Sat 99A%, CO 4.2/CI 2.4, SVR 2208, PVR 1.67 SHERIDAN, Ao 194/74 HR 51

## 2024-08-26 NOTE — HISTORY OF PRESENT ILLNESS
[FreeTextEntry1] : Ms. Salvador is a 64 year old woman with HFpEF, severe MR/TR, HTN, AF with prior CVA (on Eliquis), asthma and dysphagia. Who presents for routine follow-up of her cardiomyopathy.   Hospitalized July 2024 for ADHF, treated with IV Lasix. TTE notable for mod MR and severe TR, seen by Structural Dr. Jacobs who recommended medical optimization. Cardiac MRI without LGE. She was discharged on Entresto 24-26mg BID, Farxiga 10 mg QD, Spironolactone 12.5 mg QD and Furosemide 20 mg QD at a weight of 140.6 lbs (from 149 on admission).    At last visit earlier this month, Entresto was escalated which she tolerated well without LH/dizziness. AT remains excellent, attending PT sessions which requires her to walk from her home to the train station that takes approximately one hour. No issues climbing the flight of stairs in her home. Brought BP machine today as was having trouble performing readings, machine accuracy verified. Has not been able to log weights, scale ran out of battery. Clinic weight is stable without the use of PRN Lasix. She denies CP, palpitations, orthopnea, PND, ABD distention and LE swelling. Appetite is good.

## 2024-08-26 NOTE — PHYSICAL EXAM
[Normal Conjunctiva] : normal conjunctiva [Soft] : abdomen soft [Non Tender] : non-tender [Normal Venous Pressure] : normal venous pressure [No Rub] : no rub [No Gallop] : no gallop [Normal Bowel Sounds] : normal bowel sounds [Normal Radial B/L] : normal radial B/L [Normal] : alert and oriented, normal memory [de-identified] : No perioral cyanosis, MMM  [de-identified] : JVP 6-8 cm H2O, no HJR [de-identified] : III/IV Systolic Murmur  [de-identified] : Irregularly irregular [de-identified] : Warm peripherally

## 2024-08-26 NOTE — ASSESSMENT
[FreeTextEntry1] : 64 year old woman with HFpEF, severe MR/TR, HTN, AF with prior CVA (on Eliquis), asthma and dysphagia who is doing well after recent hospitalization for ADHF in July. She is ACC/AHA Stage C, NYHA Class II, euvolemic and normotensive.     # HFpEF - Continue Farxiga 10 mg QD - Continue Spironolactone 25 mg QD - Also on Entresto, BP well controlled on 49-51 mg BID - Can utilize PRN Lasix for acute weight gain - Labs today with K 4.1, normal renal function and pro-BNP also remains normal  # Severe TR - Optimization of HFpEF as above - Followed by Structural, Dr. Jacobs  # AF - On Toprol XL 25 mg QD - On AC with Eliquis - Unclear if rhythm control was previously attempted or considered, Follow-up with Dr. Jackson   Plan was discussed with daughter Gracy over telephone Follow-up with Dr. Jacobs as scheduled next month and Dr. Major in 3 months, sooner if needed

## 2024-08-27 ENCOUNTER — NON-APPOINTMENT (OUTPATIENT)
Age: 64
End: 2024-08-27

## 2024-08-27 NOTE — ED PROVIDER NOTE - RELIEVING FACTORS
[FreeTextEntry1] : pT3 N0 M0 ADenocarcinoma SIgmoid colon, in Jun 2021  - Invasive moderately differentiated adenocarcinoma invading through the muscularis propria into pericolorectal tissue. - Tattoo is present. - Diverticulosis. - 24 lymph nodes, negative for carcinoma (0/24). - Margins of resection are not involved.  Resected node-negative (stage II) disease, the benefits of chemotherapy are controversial, as is the relative benefit of an oxaliplatin-based as compared with a non-oxaliplatin-based regimen. Benefits of chemotherapy is based on high risk features Patient does not have any high risk features, No LVI NO neural invasion 25 LN examined, NO Perforation  GIven no high risk features no benefit of adjuvant chemotherapy. The patient was started on surveillance.  - Today's Labs with Normal WBC - F/u visit with labs cbc/ cmp/ CEA q 3months x 2 y and then q 6 months from Y 3-5 - CT CAP q 6 months x 5 years and Y3-5 and consider spacing q 8-12 months - 1 year colonoscopy showed no evidence of recurrence. - Ct imaging reviewed with patient. No evidence of recurrence noted on imaging from 8/2022, 9/2022, and 4/2023, 10/2023   -11/01/23: Ms Marie returns for follow up. Her CT imaging from 10/23/23 shows no evidence of recurrence/metastases and she remains with JADEN. Doing well overall, debating sonuher to take the covid booster.  Encouraged her to start Orencia as recommended by Dr. Key as he pain from RA seems to be quite detrimental to her quality of life.    05/10/24: Ms Marie returns for follow up. Recent CT imaging from March 2024 shows JADEN.  Not taking orencia, doing acupuncture.  Discussed the "kneesovertoes khoi" as a way to address some functional mobility and pain issues with her knees and pelvis.  Follow up in 3 months.  08/27/24:  Ms Marie returns for follow up. She is getting more exercise and plans to go to the gym with her daughter soon.  She will see a new rheumatologist in the coming weeks.  Will order surveillance CT imaging for end of October, and she will follow up in November to review the results. 
heat

## 2024-08-28 ENCOUNTER — NON-APPOINTMENT (OUTPATIENT)
Age: 64
End: 2024-08-28

## 2024-08-28 ENCOUNTER — APPOINTMENT (OUTPATIENT)
Dept: CARDIOLOGY | Facility: CLINIC | Age: 64
End: 2024-08-28
Payer: MEDICAID

## 2024-08-28 VITALS
HEIGHT: 64 IN | WEIGHT: 142 LBS | DIASTOLIC BLOOD PRESSURE: 79 MMHG | HEART RATE: 58 BPM | SYSTOLIC BLOOD PRESSURE: 148 MMHG | BODY MASS INDEX: 24.24 KG/M2 | OXYGEN SATURATION: 98 %

## 2024-08-28 DIAGNOSIS — I34.0 NONRHEUMATIC MITRAL (VALVE) INSUFFICIENCY: ICD-10-CM

## 2024-08-28 DIAGNOSIS — I07.1 RHEUMATIC TRICUSPID INSUFFICIENCY: ICD-10-CM

## 2024-08-28 DIAGNOSIS — I48.91 UNSPECIFIED ATRIAL FIBRILLATION: ICD-10-CM

## 2024-08-28 DIAGNOSIS — I10 ESSENTIAL (PRIMARY) HYPERTENSION: ICD-10-CM

## 2024-08-28 PROCEDURE — G2211 COMPLEX E/M VISIT ADD ON: CPT | Mod: NC

## 2024-08-28 PROCEDURE — 93000 ELECTROCARDIOGRAM COMPLETE: CPT

## 2024-08-28 PROCEDURE — 99214 OFFICE O/P EST MOD 30 MIN: CPT | Mod: 25

## 2024-08-28 RX ORDER — APIXABAN 5 MG/1
5 TABLET, FILM COATED ORAL
Qty: 180 | Refills: 3 | Status: ACTIVE | COMMUNITY

## 2024-08-29 PROBLEM — I50.9 HEART FAILURE, UNSPECIFIED: Chronic | Status: ACTIVE | Noted: 2024-07-23

## 2024-08-29 PROBLEM — M25.552 PAIN IN LEFT HIP: Chronic | Status: ACTIVE | Noted: 2024-07-23

## 2024-08-29 PROBLEM — I48.20 CHRONIC ATRIAL FIBRILLATION, UNSPECIFIED: Chronic | Status: ACTIVE | Noted: 2024-07-23

## 2024-08-29 PROBLEM — J45.909 UNSPECIFIED ASTHMA, UNCOMPLICATED: Chronic | Status: ACTIVE | Noted: 2024-07-23

## 2024-08-29 PROBLEM — I10 ESSENTIAL (PRIMARY) HYPERTENSION: Chronic | Status: ACTIVE | Noted: 2024-07-23

## 2024-08-29 NOTE — DISCUSSION/SUMMARY
[Atrial Fibrillation] : atrial fibrillation [Controlled Ventricular Response] : controlled ventricular response [Hypertension] : hypertension [Low Sodium Diet] : low sodium diet [Moderate Mitral Regurgitation] : moderate mitral regurgitation [Compensated] : compensated [Stable] : stable [FreeTextEntry1] : Currently stable from a cardiovascular standpoint. Hypertensive today (BP usually normal). Appears euvolemic. Echo results from July reviewed. Patient with moderate MR (improved from prior echo in April) and severe TR with preserved LV systolic function (LVEF 57%). Pulmonary hypertension likely secondary to valvular heart disease. Rate-controlled atrial fibrillation. History of cath which revealed proximal LAD ectasia but otherwise normal coronaries. Continue current medications including apixaban, Entresto, furosemide 20 mg daily and metoprolol succinate (patient/family to call to confirm dosages). ECG completed today and reviewed (findings as noted above). Follow up in 3 months. [EKG obtained to assist in diagnosis and management of assessed problem(s)] : EKG obtained to assist in diagnosis and management of assessed problem(s)

## 2024-08-29 NOTE — CARDIOLOGY SUMMARY
[de-identified] : 08/28/24 - atrial fibrillation, 68 bpm, occasional PVC, poor R-wave progression [de-identified] : 07/23/24 - mod MR, severe LAE, segmental wall motion abnormality, mod CARLEEN, normal RV size and systolic function, severe TR, PASP 49 mmHg (may be underestimated), LVEF 57% 04/20/24 - severe MR, severe LAE, normal LV size and function, mild RVE with normal RV systolic function, severe TR, severe CARLEEN, PASP 49 mmHg, LVEF 50-55% 11/23/22 - mitral valve prolapse involving the anterior mitral leaflet, mild MR, peak AV gradient 16 mmHg, severe LAE, normal LV and RV size and function, mod TR, severe CARLEEN, RVSP 44 mmHg, LVEF 55-60% [de-identified] : 07/03/24 (CATH) - mild ectasia in proximal LAD otherwise normal coronaries, RA 12 mmHg, PA 34/15/22 mmHg, PCW 15 mmHg, CI 2.40 L/min/m2, SVR 2208 dsc

## 2024-08-29 NOTE — PHYSICAL EXAM
[Well Developed] : well developed [Well Nourished] : well nourished [No Acute Distress] : no acute distress [Normal Conjunctiva] : normal conjunctiva [Normal Venous Pressure] : normal venous pressure [Normal S1, S2] : normal S1, S2 [No Carotid Bruit] : no carotid bruit [No Murmur] : no murmur [No Rub] : no rub [No Gallop] : no gallop [Clear Lung Fields] : clear lung fields [Good Air Entry] : good air entry [No Respiratory Distress] : no respiratory distress  [Soft] : abdomen soft [Non Tender] : non-tender [Normal Gait] : normal gait [No Edema] : no edema [No Cyanosis] : no cyanosis [No Rash] : no rash [No Skin Lesions] : no skin lesions [Moves all extremities] : moves all extremities [No Focal Deficits] : no focal deficits [Normal Speech] : normal speech [Alert and Oriented] : alert and oriented

## 2024-08-29 NOTE — HISTORY OF PRESENT ILLNESS
[FreeTextEntry1] : Patient is a poor historian. Accompanied buy sisters and daughter (on phone). Currently doing okay. Denies chest pain, shortness of breath or palpitations.

## 2024-09-09 ENCOUNTER — APPOINTMENT (OUTPATIENT)
Dept: INTERNAL MEDICINE | Facility: CLINIC | Age: 64
End: 2024-09-09
Payer: MEDICAID

## 2024-09-09 VITALS
OXYGEN SATURATION: 98 % | HEART RATE: 61 BPM | DIASTOLIC BLOOD PRESSURE: 82 MMHG | TEMPERATURE: 98 F | WEIGHT: 146 LBS | SYSTOLIC BLOOD PRESSURE: 151 MMHG | BODY MASS INDEX: 24.92 KG/M2 | HEIGHT: 64 IN

## 2024-09-09 DIAGNOSIS — Z51.81 ENCOUNTER FOR THERAPEUTIC DRUG LVL MONITORING: ICD-10-CM

## 2024-09-09 DIAGNOSIS — I63.9 CEREBRAL INFARCTION, UNSPECIFIED: ICD-10-CM

## 2024-09-09 DIAGNOSIS — I50.30 UNSPECIFIED DIASTOLIC (CONGESTIVE) HEART FAILURE: ICD-10-CM

## 2024-09-09 DIAGNOSIS — Z79.01 ENCOUNTER FOR THERAPEUTIC DRUG LVL MONITORING: ICD-10-CM

## 2024-09-09 DIAGNOSIS — I48.91 UNSPECIFIED ATRIAL FIBRILLATION: ICD-10-CM

## 2024-09-09 DIAGNOSIS — J98.4 EMPHYSEMA, UNSPECIFIED: ICD-10-CM

## 2024-09-09 DIAGNOSIS — J43.9 EMPHYSEMA, UNSPECIFIED: ICD-10-CM

## 2024-09-09 DIAGNOSIS — J45.909 UNSPECIFIED ASTHMA, UNCOMPLICATED: ICD-10-CM

## 2024-09-09 DIAGNOSIS — I27.20 PULMONARY HYPERTENSION, UNSPECIFIED: ICD-10-CM

## 2024-09-09 PROCEDURE — G2211 COMPLEX E/M VISIT ADD ON: CPT | Mod: NC

## 2024-09-09 PROCEDURE — 99215 OFFICE O/P EST HI 40 MIN: CPT

## 2024-09-09 NOTE — HISTORY OF PRESENT ILLNESS
[FreeTextEntry1] : Patient presents today for follow-up of chronic medical conditions.  [de-identified] : PMHx - afib, dysphagia, asthma, s/p CVA   Patient hospitalized 7/23/24 - 7/26/24 for CISNEROS - found to have CHF with severe MR and TR - new diagnosis.  Patient has since been to cardiology.  Patient is poor historian - discussed with daughter - unclear what medications patient is taking.  Reports she no longer has entresto - her sister discarded at local police station. Reports weight is stable - has not taken lasix ?  Also has not been taking metoprolol as she was confused of medication list    Still having persistent dysphagia to solid foods - able to tolerate liquids Waiting to Lupe Mcginnis (daughter) to return from [place] - in order to help set up motiliy testing   Prior -  Poor historian Feels well - some fatigue Lives alone - two daughters who live in [place]  Chart reviewed to elicit medical history  Spoke Lupe Mcginnis - daughter - obtained medical history  Injury to leg due to injury at [employer]  Discussed transition to DOAC

## 2024-09-09 NOTE — HEALTH RISK ASSESSMENT
[Good] : ~his/her~  mood as  good [No] : In the past 12 months have you used drugs other than those required for medical reasons? No [No falls in past year] : Patient reported no falls in the past year [0] : 2) Feeling down, depressed, or hopeless: Not at all (0) [PHQ-2 Negative - No further assessment needed] : PHQ-2 Negative - No further assessment needed [Never] : Never [Behavioral] : behavioral [Health Literacy] : health literacy [Transportation] : transportation [Alone] : lives alone [Retired] : retired [# Of Children ___] : has [unfilled] children [Feels Safe at Home] : Feels safe at home [Fully functional (bathing, dressing, toileting, transferring, walking, feeding)] : Fully functional (bathing, dressing, toileting, transferring, walking, feeding) [Fully functional (using the telephone, shopping, preparing meals, housekeeping, doing laundry, using] : Fully functional and needs no help or supervision to perform IADLs (using the telephone, shopping, preparing meals, housekeeping, doing laundry, using transportation, managing medications and managing finances) [de-identified] : no formal exercise [de-identified] : balanced varied diet without restrictions [[id number]] : 0 [High Risk Behavior] : no high risk behavior [Reports changes in hearing] : Reports no changes in hearing [Reports changes in vision] : Reports no changes in vision [MammogramDate] : 6/2023 [PapSmearComments] : unknown - will confirm [BoneDensityComments] : will discuss in future  [ColonoscopyComments] : never - will submit referral  [de-identified] : will obtain collateral

## 2024-09-09 NOTE — PLAN
[FreeTextEntry1] : [ ] thyroid US  - repeat 6 months for surveillance of nodule  [ ] colonoscopy - never completed, patient will schedule in our office [ ] hx of CVA in [facility] - will need neurologist   #HTN Patient with history of hypertension.  BP today acceptable Patient to continue current regimen as prescribed. Patient instructed to continue monitoring blood pressure at home and to keep a journal. Will continue to monitor patient's bp and adjust his hypertensive regimen as needed.  #A-fib previously on coumadin  tolerating DOAC as of 12/2023 [ ] all medications reviewed in person today with patient and daughter over phone ** discussed correct administration in detail  [ ] RESTART entresto, metoprolol succinate 25 daily, lasix prn for weight gain ** patient's sister discarded entresto - will go to pharmacy to obtain a new prescription and call if any issues  #Oropharyngeal dysphagia Work-up thus far - esophagram performed on March 14 2023 - GERD with barium tablet impacted at the GE junction. EGD as of August 9, 2023 - negative for eosinophilic esophagitis, stomach normal, duodenal normal. [ ] motility testing pending - will schedule as of 4/2024   ** spoke to Lupe to confirm appt can be made as of 4/2024 - will have office reach to Lupe to schedule   I spent a total of 40 minutes on the date of this encounter, which included: Preparing to see the patient (e.g., review of test results) Obtaining and/or reviewing separately obtained history Performing a medically appropriate exam and/or evaluation Counseling and educating the patient/family/caregiver Ordering medications, tests, procedures Referring and communicating with other healthcare professionals, when not separately reported Documenting clinical information in the health record Care coordination not separately reported

## 2024-09-12 ENCOUNTER — APPOINTMENT (OUTPATIENT)
Dept: CARDIOLOGY | Facility: CLINIC | Age: 64
End: 2024-09-12

## 2024-09-23 ENCOUNTER — APPOINTMENT (OUTPATIENT)
Dept: RHEUMATOLOGY | Facility: CLINIC | Age: 64
End: 2024-09-23
Payer: MEDICAID

## 2024-09-23 VITALS
DIASTOLIC BLOOD PRESSURE: 81 MMHG | HEART RATE: 84 BPM | BODY MASS INDEX: 24.75 KG/M2 | RESPIRATION RATE: 16 BRPM | WEIGHT: 145 LBS | HEIGHT: 64 IN | SYSTOLIC BLOOD PRESSURE: 122 MMHG | OXYGEN SATURATION: 97 %

## 2024-09-23 DIAGNOSIS — G89.29 PAIN IN LEFT HIP: ICD-10-CM

## 2024-09-23 DIAGNOSIS — M25.552 PAIN IN LEFT HIP: ICD-10-CM

## 2024-09-23 PROCEDURE — 99214 OFFICE O/P EST MOD 30 MIN: CPT

## 2024-09-29 NOTE — PHYSICAL EXAM
[General Appearance - Alert] : alert [General Appearance - In No Acute Distress] : in no acute distress [Full Pulse] : the pedal pulses are present [Edema] : there was no peripheral edema [] : no rash [FreeTextEntry1] : range of motion of the hips is preserved; no crepitus or synovitis

## 2024-09-29 NOTE — HISTORY OF PRESENT ILLNESS
[FreeTextEntry1] : Hip pain for the past year.  She denies any recent injury.  She had a fall in 2003 when she fell off the chair and needed a cast for "bone chip) She was prescribed PT and it helps for that day but then pain comes back the following day.  She started PT in early August and has been going 2 times per week for the past six weeks.  She was prescribed NSAIDs which are not really helping.  x-ray hips 2024: Unremarkable radiographs of the pelvis/bilateral hips. x-ray knees 2024: Mild to moderate osteoarthrosis of the bilateral knees involving the medial and patellofemoral joint space compartments.

## 2024-09-29 NOTE — ASSESSMENT
[FreeTextEntry1] : Persistent pain in the left hip despite NSAIDs and PT  no etiology elucidated on x-ray of the hips  check MRI of the hip to evaluate for presence of internal derangement  avoid NSAIDs as also on anticoagulation  OV after MRI is complete  Tylenol as needed for pain   My collective time spent on today's visit was greater than 30 minutes and included: Preparation for the visit, review of the medical records, review of pertinent diagnostic studies, examination and counseling of the patient on the above diagnosis, treatment plan and prognosis, orders of diagnostic test, medications and or appropriate procedures and documentation in the medical record of today's visit.

## 2024-10-01 ENCOUNTER — RX RENEWAL (OUTPATIENT)
Age: 64
End: 2024-10-01

## 2024-10-09 ENCOUNTER — NON-APPOINTMENT (OUTPATIENT)
Age: 64
End: 2024-10-09

## 2024-10-10 ENCOUNTER — APPOINTMENT (OUTPATIENT)
Dept: ORTHOPEDIC SURGERY | Facility: CLINIC | Age: 64
End: 2024-10-10
Payer: MEDICAID

## 2024-10-10 ENCOUNTER — NON-APPOINTMENT (OUTPATIENT)
Age: 64
End: 2024-10-10

## 2024-10-10 DIAGNOSIS — M70.62 TROCHANTERIC BURSITIS, RIGHT HIP: ICD-10-CM

## 2024-10-10 DIAGNOSIS — M16.12 UNILATERAL PRIMARY OSTEOARTHRITIS, LEFT HIP: ICD-10-CM

## 2024-10-10 DIAGNOSIS — M70.61 TROCHANTERIC BURSITIS, RIGHT HIP: ICD-10-CM

## 2024-10-10 PROCEDURE — 73502 X-RAY EXAM HIP UNI 2-3 VIEWS: CPT | Mod: LT

## 2024-10-10 PROCEDURE — 99203 OFFICE O/P NEW LOW 30 MIN: CPT | Mod: 25

## 2024-10-10 PROCEDURE — 72100 X-RAY EXAM L-S SPINE 2/3 VWS: CPT

## 2024-10-10 PROCEDURE — 20610 DRAIN/INJ JOINT/BURSA W/O US: CPT | Mod: LT

## 2024-10-10 RX ORDER — DICLOFENAC SODIUM 10 MG/G
1 GEL TOPICAL DAILY
Qty: 1 | Refills: 0 | Status: ACTIVE | COMMUNITY
Start: 2024-10-10 | End: 1900-01-01

## 2024-10-18 ENCOUNTER — APPOINTMENT (OUTPATIENT)
Dept: PULMONOLOGY | Facility: CLINIC | Age: 64
End: 2024-10-18
Payer: MEDICAID

## 2024-10-18 VITALS
WEIGHT: 143 LBS | DIASTOLIC BLOOD PRESSURE: 78 MMHG | BODY MASS INDEX: 24.41 KG/M2 | SYSTOLIC BLOOD PRESSURE: 118 MMHG | HEART RATE: 80 BPM | HEIGHT: 64 IN | OXYGEN SATURATION: 96 %

## 2024-10-18 DIAGNOSIS — R76.8 OTHER SPECIFIED ABNORMAL IMMUNOLOGICAL FINDINGS IN SERUM: ICD-10-CM

## 2024-10-18 DIAGNOSIS — I27.20 PULMONARY HYPERTENSION, UNSPECIFIED: ICD-10-CM

## 2024-10-18 DIAGNOSIS — J98.4 EMPHYSEMA, UNSPECIFIED: ICD-10-CM

## 2024-10-18 DIAGNOSIS — J43.9 EMPHYSEMA, UNSPECIFIED: ICD-10-CM

## 2024-10-18 DIAGNOSIS — Z23 ENCOUNTER FOR IMMUNIZATION: ICD-10-CM

## 2024-10-18 DIAGNOSIS — J82.83 EOSINOPHILIC ASTHMA: ICD-10-CM

## 2024-10-18 DIAGNOSIS — J30.9 ALLERGIC RHINITIS, UNSPECIFIED: ICD-10-CM

## 2024-10-18 PROCEDURE — 94010 BREATHING CAPACITY TEST: CPT

## 2024-10-18 PROCEDURE — 94727 GAS DIL/WSHOT DETER LNG VOL: CPT

## 2024-10-18 PROCEDURE — ZZZZZ: CPT

## 2024-10-18 PROCEDURE — 94729 DIFFUSING CAPACITY: CPT

## 2024-10-18 PROCEDURE — 95012 NITRIC OXIDE EXP GAS DETER: CPT

## 2024-10-18 PROCEDURE — 99214 OFFICE O/P EST MOD 30 MIN: CPT | Mod: 25

## 2024-10-24 ENCOUNTER — APPOINTMENT (OUTPATIENT)
Dept: ORTHOPEDIC SURGERY | Facility: CLINIC | Age: 64
End: 2024-10-24
Payer: MEDICAID

## 2024-10-24 VITALS — HEIGHT: 65 IN | BODY MASS INDEX: 23.82 KG/M2 | WEIGHT: 143 LBS

## 2024-10-24 DIAGNOSIS — R06.02 SHORTNESS OF BREATH: ICD-10-CM

## 2024-10-24 DIAGNOSIS — M17.12 UNILATERAL PRIMARY OSTEOARTHRITIS, LEFT KNEE: ICD-10-CM

## 2024-10-24 DIAGNOSIS — M16.12 UNILATERAL PRIMARY OSTEOARTHRITIS, LEFT HIP: ICD-10-CM

## 2024-10-24 PROCEDURE — 73562 X-RAY EXAM OF KNEE 3: CPT | Mod: LT

## 2024-10-24 PROCEDURE — 99213 OFFICE O/P EST LOW 20 MIN: CPT

## 2024-10-24 RX ORDER — MELOXICAM 15 MG/1
15 TABLET ORAL DAILY
Qty: 10 | Refills: 1 | Status: ACTIVE | COMMUNITY
Start: 2024-10-24 | End: 1900-01-01

## 2024-10-28 RX ORDER — DICLOFENAC SODIUM 75 MG/1
75 TABLET, DELAYED RELEASE ORAL
Qty: 28 | Refills: 0 | Status: ACTIVE | COMMUNITY
Start: 2024-10-28 | End: 1900-01-01

## 2024-11-14 RX ORDER — CYCLOBENZAPRINE HYDROCHLORIDE 7.5 MG/1
7.5 TABLET, FILM COATED ORAL
Qty: 14 | Refills: 0 | Status: ACTIVE | COMMUNITY
Start: 2024-11-14 | End: 1900-01-01

## 2024-11-20 ENCOUNTER — NON-APPOINTMENT (OUTPATIENT)
Age: 64
End: 2024-11-20

## 2024-11-20 ENCOUNTER — APPOINTMENT (OUTPATIENT)
Dept: CARDIOLOGY | Facility: CLINIC | Age: 64
End: 2024-11-20
Payer: MEDICAID

## 2024-11-20 VITALS
SYSTOLIC BLOOD PRESSURE: 102 MMHG | WEIGHT: 143 LBS | HEIGHT: 65 IN | OXYGEN SATURATION: 96 % | BODY MASS INDEX: 23.82 KG/M2 | HEART RATE: 73 BPM | DIASTOLIC BLOOD PRESSURE: 66 MMHG

## 2024-11-20 DIAGNOSIS — I10 ESSENTIAL (PRIMARY) HYPERTENSION: ICD-10-CM

## 2024-11-20 DIAGNOSIS — M70.61 TROCHANTERIC BURSITIS, RIGHT HIP: ICD-10-CM

## 2024-11-20 DIAGNOSIS — I34.0 NONRHEUMATIC MITRAL (VALVE) INSUFFICIENCY: ICD-10-CM

## 2024-11-20 DIAGNOSIS — M70.62 TROCHANTERIC BURSITIS, RIGHT HIP: ICD-10-CM

## 2024-11-20 PROCEDURE — 93000 ELECTROCARDIOGRAM COMPLETE: CPT

## 2024-11-20 PROCEDURE — 99214 OFFICE O/P EST MOD 30 MIN: CPT

## 2024-11-20 PROCEDURE — G2211 COMPLEX E/M VISIT ADD ON: CPT | Mod: NC

## 2024-11-21 PROBLEM — M70.61 TROCHANTERIC BURSITIS OF BOTH HIPS: Status: RESOLVED | Noted: 2024-10-10 | Resolved: 2024-11-21

## 2024-11-25 ENCOUNTER — APPOINTMENT (OUTPATIENT)
Dept: ORTHOPEDIC SURGERY | Facility: CLINIC | Age: 64
End: 2024-11-25
Payer: MEDICAID

## 2024-11-25 ENCOUNTER — APPOINTMENT (OUTPATIENT)
Dept: INTERNAL MEDICINE | Facility: CLINIC | Age: 64
End: 2024-11-25
Payer: MEDICAID

## 2024-11-25 VITALS
HEIGHT: 65 IN | BODY MASS INDEX: 24.16 KG/M2 | SYSTOLIC BLOOD PRESSURE: 102 MMHG | WEIGHT: 145 LBS | DIASTOLIC BLOOD PRESSURE: 70 MMHG | HEART RATE: 79 BPM | OXYGEN SATURATION: 99 %

## 2024-11-25 VITALS — BODY MASS INDEX: 24.16 KG/M2 | WEIGHT: 145 LBS | HEIGHT: 65 IN

## 2024-11-25 DIAGNOSIS — M25.552 PAIN IN LEFT HIP: ICD-10-CM

## 2024-11-25 DIAGNOSIS — G89.29 PAIN IN LEFT HIP: ICD-10-CM

## 2024-11-25 DIAGNOSIS — Z51.81 ENCOUNTER FOR THERAPEUTIC DRUG LVL MONITORING: ICD-10-CM

## 2024-11-25 DIAGNOSIS — I48.91 UNSPECIFIED ATRIAL FIBRILLATION: ICD-10-CM

## 2024-11-25 DIAGNOSIS — I63.9 CEREBRAL INFARCTION, UNSPECIFIED: ICD-10-CM

## 2024-11-25 DIAGNOSIS — J45.909 UNSPECIFIED ASTHMA, UNCOMPLICATED: ICD-10-CM

## 2024-11-25 DIAGNOSIS — Z79.01 ENCOUNTER FOR THERAPEUTIC DRUG LVL MONITORING: ICD-10-CM

## 2024-11-25 DIAGNOSIS — I50.30 UNSPECIFIED DIASTOLIC (CONGESTIVE) HEART FAILURE: ICD-10-CM

## 2024-11-25 PROCEDURE — 99214 OFFICE O/P EST MOD 30 MIN: CPT

## 2024-11-25 PROCEDURE — 99215 OFFICE O/P EST HI 40 MIN: CPT

## 2024-11-25 PROCEDURE — G2211 COMPLEX E/M VISIT ADD ON: CPT | Mod: NC

## 2024-11-25 RX ORDER — MELOXICAM 7.5 MG/1
7.5 TABLET ORAL DAILY
Qty: 14 | Refills: 0 | Status: ACTIVE | COMMUNITY
Start: 2024-11-25 | End: 1900-01-01

## 2024-11-29 ENCOUNTER — APPOINTMENT (OUTPATIENT)
Dept: HEART FAILURE | Facility: CLINIC | Age: 64
End: 2024-11-29

## 2024-11-29 LAB
ALBUMIN SERPL ELPH-MCNC: 4.3 G/DL
ALP BLD-CCNC: 76 U/L
ALT SERPL-CCNC: 12 U/L
ANION GAP SERPL CALC-SCNC: 12 MMOL/L
AST SERPL-CCNC: 24 U/L
BASOPHILS # BLD AUTO: 0.01 K/UL
BASOPHILS NFR BLD AUTO: 0.1 %
BILIRUB SERPL-MCNC: 0.4 MG/DL
BUN SERPL-MCNC: 16 MG/DL
CALCIUM SERPL-MCNC: 10.1 MG/DL
CHLORIDE SERPL-SCNC: 104 MMOL/L
CHOLEST SERPL-MCNC: 193 MG/DL
CO2 SERPL-SCNC: 26 MMOL/L
CREAT SERPL-MCNC: 0.84 MG/DL
EGFR: 78 ML/MIN/1.73M2
EOSINOPHIL # BLD AUTO: 0.12 K/UL
EOSINOPHIL NFR BLD AUTO: 1.8 %
GLUCOSE SERPL-MCNC: 84 MG/DL
HCT VFR BLD CALC: 39 %
HDLC SERPL-MCNC: 76 MG/DL
HGB BLD-MCNC: 12.4 G/DL
IMM GRANULOCYTES NFR BLD AUTO: 0.3 %
LDLC SERPL CALC-MCNC: 107 MG/DL
LYMPHOCYTES # BLD AUTO: 2.13 K/UL
LYMPHOCYTES NFR BLD AUTO: 31.6 %
MAN DIFF?: NORMAL
MCHC RBC-ENTMCNC: 28.8 PG
MCHC RBC-ENTMCNC: 31.8 G/DL
MCV RBC AUTO: 90.7 FL
MONOCYTES # BLD AUTO: 0.45 K/UL
MONOCYTES NFR BLD AUTO: 6.7 %
NEUTROPHILS # BLD AUTO: 4.01 K/UL
NEUTROPHILS NFR BLD AUTO: 59.5 %
NONHDLC SERPL-MCNC: 117 MG/DL
PLATELET # BLD AUTO: 245 K/UL
POTASSIUM SERPL-SCNC: 4.2 MMOL/L
PROT SERPL-MCNC: 7.9 G/DL
RBC # BLD: 4.3 M/UL
RBC # FLD: 14.8 %
SODIUM SERPL-SCNC: 142 MMOL/L
TRIGL SERPL-MCNC: 55 MG/DL
TSH SERPL-ACNC: 1.13 UIU/ML
WBC # FLD AUTO: 6.74 K/UL

## 2024-12-07 ENCOUNTER — RX RENEWAL (OUTPATIENT)
Age: 64
End: 2024-12-07

## 2024-12-09 RX ORDER — CELECOXIB 200 MG/1
200 CAPSULE ORAL
Qty: 28 | Refills: 1 | Status: ACTIVE | COMMUNITY
Start: 2024-12-09 | End: 1900-01-01

## 2024-12-11 ENCOUNTER — APPOINTMENT (OUTPATIENT)
Dept: INTERNAL MEDICINE | Facility: CLINIC | Age: 64
End: 2024-12-11
Payer: MEDICAID

## 2024-12-11 VITALS
SYSTOLIC BLOOD PRESSURE: 100 MMHG | DIASTOLIC BLOOD PRESSURE: 60 MMHG | HEART RATE: 67 BPM | OXYGEN SATURATION: 99 % | HEIGHT: 65 IN

## 2024-12-11 DIAGNOSIS — I50.30 UNSPECIFIED DIASTOLIC (CONGESTIVE) HEART FAILURE: ICD-10-CM

## 2024-12-11 DIAGNOSIS — J98.4 EMPHYSEMA, UNSPECIFIED: ICD-10-CM

## 2024-12-11 DIAGNOSIS — I63.9 CEREBRAL INFARCTION, UNSPECIFIED: ICD-10-CM

## 2024-12-11 DIAGNOSIS — I48.91 UNSPECIFIED ATRIAL FIBRILLATION: ICD-10-CM

## 2024-12-11 DIAGNOSIS — J43.9 EMPHYSEMA, UNSPECIFIED: ICD-10-CM

## 2024-12-11 PROCEDURE — 99215 OFFICE O/P EST HI 40 MIN: CPT

## 2024-12-11 PROCEDURE — G2211 COMPLEX E/M VISIT ADD ON: CPT | Mod: NC

## 2024-12-16 ENCOUNTER — APPOINTMENT (OUTPATIENT)
Dept: MRI IMAGING | Facility: CLINIC | Age: 64
End: 2024-12-16
Payer: MEDICAID

## 2024-12-16 ENCOUNTER — OUTPATIENT (OUTPATIENT)
Dept: OUTPATIENT SERVICES | Facility: HOSPITAL | Age: 64
LOS: 1 days | End: 2024-12-16
Payer: MEDICAID

## 2024-12-16 DIAGNOSIS — M16.12 UNILATERAL PRIMARY OSTEOARTHRITIS, LEFT HIP: ICD-10-CM

## 2024-12-16 PROCEDURE — 73721 MRI JNT OF LWR EXTRE W/O DYE: CPT

## 2024-12-16 PROCEDURE — 73721 MRI JNT OF LWR EXTRE W/O DYE: CPT | Mod: 26,LT

## 2025-01-23 ENCOUNTER — NON-APPOINTMENT (OUTPATIENT)
Age: 65
End: 2025-01-23

## 2025-01-23 ENCOUNTER — APPOINTMENT (OUTPATIENT)
Dept: ORTHOPEDIC SURGERY | Facility: CLINIC | Age: 65
End: 2025-01-23
Payer: OTHER MISCELLANEOUS

## 2025-01-23 VITALS — WEIGHT: 149 LBS | HEIGHT: 65 IN | BODY MASS INDEX: 24.83 KG/M2

## 2025-01-23 DIAGNOSIS — M25.552 PAIN IN LEFT HIP: ICD-10-CM

## 2025-01-23 DIAGNOSIS — G89.29 PAIN IN LEFT HIP: ICD-10-CM

## 2025-01-23 PROCEDURE — 72170 X-RAY EXAM OF PELVIS: CPT

## 2025-01-23 PROCEDURE — 99455 WORK RELATED DISABILITY EXAM: CPT

## 2025-02-13 ENCOUNTER — APPOINTMENT (OUTPATIENT)
Dept: ORTHOPEDIC SURGERY | Facility: CLINIC | Age: 65
End: 2025-02-13
Payer: OTHER MISCELLANEOUS

## 2025-02-13 VITALS — WEIGHT: 149 LBS | BODY MASS INDEX: 24.83 KG/M2 | HEIGHT: 65 IN

## 2025-02-13 DIAGNOSIS — M16.12 UNILATERAL PRIMARY OSTEOARTHRITIS, LEFT HIP: ICD-10-CM

## 2025-02-13 DIAGNOSIS — M54.50 LOW BACK PAIN, UNSPECIFIED: ICD-10-CM

## 2025-02-13 DIAGNOSIS — M17.12 UNILATERAL PRIMARY OSTEOARTHRITIS, LEFT KNEE: ICD-10-CM

## 2025-02-13 PROCEDURE — 72100 X-RAY EXAM L-S SPINE 2/3 VWS: CPT

## 2025-02-13 PROCEDURE — 99214 OFFICE O/P EST MOD 30 MIN: CPT

## 2025-02-14 ENCOUNTER — APPOINTMENT (OUTPATIENT)
Dept: HEART FAILURE | Facility: CLINIC | Age: 65
End: 2025-02-14

## 2025-02-14 PROBLEM — M54.50 LOW BACK PAIN: Status: ACTIVE | Noted: 2025-02-14

## 2025-02-24 DIAGNOSIS — I48.91 UNSPECIFIED ATRIAL FIBRILLATION: ICD-10-CM

## 2025-02-28 ENCOUNTER — APPOINTMENT (OUTPATIENT)
Dept: PULMONOLOGY | Facility: CLINIC | Age: 65
End: 2025-02-28
Payer: MEDICAID

## 2025-02-28 VITALS
BODY MASS INDEX: 24.83 KG/M2 | HEIGHT: 65 IN | RESPIRATION RATE: 16 BRPM | SYSTOLIC BLOOD PRESSURE: 106 MMHG | DIASTOLIC BLOOD PRESSURE: 70 MMHG | WEIGHT: 149 LBS

## 2025-02-28 DIAGNOSIS — I27.20 PULMONARY HYPERTENSION, UNSPECIFIED: ICD-10-CM

## 2025-02-28 DIAGNOSIS — R06.02 SHORTNESS OF BREATH: ICD-10-CM

## 2025-02-28 DIAGNOSIS — J82.83 EOSINOPHILIC ASTHMA: ICD-10-CM

## 2025-02-28 DIAGNOSIS — J43.9 EMPHYSEMA, UNSPECIFIED: ICD-10-CM

## 2025-02-28 DIAGNOSIS — R76.8 OTHER SPECIFIED ABNORMAL IMMUNOLOGICAL FINDINGS IN SERUM: ICD-10-CM

## 2025-02-28 DIAGNOSIS — J98.4 EMPHYSEMA, UNSPECIFIED: ICD-10-CM

## 2025-02-28 PROCEDURE — 94010 BREATHING CAPACITY TEST: CPT

## 2025-02-28 PROCEDURE — 94729 DIFFUSING CAPACITY: CPT

## 2025-02-28 PROCEDURE — ZZZZZ: CPT

## 2025-02-28 PROCEDURE — 95012 NITRIC OXIDE EXP GAS DETER: CPT

## 2025-02-28 PROCEDURE — 94727 GAS DIL/WSHOT DETER LNG VOL: CPT

## 2025-02-28 PROCEDURE — 99214 OFFICE O/P EST MOD 30 MIN: CPT | Mod: 25

## 2025-03-05 ENCOUNTER — APPOINTMENT (OUTPATIENT)
Dept: INTERNAL MEDICINE | Facility: CLINIC | Age: 65
End: 2025-03-05

## 2025-03-07 ENCOUNTER — APPOINTMENT (OUTPATIENT)
Dept: ORTHOPEDIC SURGERY | Facility: CLINIC | Age: 65
End: 2025-03-07

## 2025-03-11 ENCOUNTER — APPOINTMENT (OUTPATIENT)
Dept: ORTHOPEDIC SURGERY | Facility: CLINIC | Age: 65
End: 2025-03-11
Payer: OTHER MISCELLANEOUS

## 2025-03-11 ENCOUNTER — APPOINTMENT (OUTPATIENT)
Dept: ORTHOPEDIC SURGERY | Facility: CLINIC | Age: 65
End: 2025-03-11

## 2025-03-11 ENCOUNTER — APPOINTMENT (OUTPATIENT)
Dept: INTERNAL MEDICINE | Facility: CLINIC | Age: 65
End: 2025-03-11
Payer: MEDICAID

## 2025-03-11 VITALS
DIASTOLIC BLOOD PRESSURE: 60 MMHG | OXYGEN SATURATION: 100 % | HEART RATE: 82 BPM | BODY MASS INDEX: 24.83 KG/M2 | WEIGHT: 149 LBS | SYSTOLIC BLOOD PRESSURE: 93 MMHG | HEIGHT: 65 IN

## 2025-03-11 VITALS — WEIGHT: 149 LBS | BODY MASS INDEX: 24.83 KG/M2 | HEIGHT: 65 IN

## 2025-03-11 DIAGNOSIS — I50.30 UNSPECIFIED DIASTOLIC (CONGESTIVE) HEART FAILURE: ICD-10-CM

## 2025-03-11 DIAGNOSIS — I48.91 UNSPECIFIED ATRIAL FIBRILLATION: ICD-10-CM

## 2025-03-11 DIAGNOSIS — M25.552 PAIN IN LEFT HIP: ICD-10-CM

## 2025-03-11 DIAGNOSIS — J43.9 EMPHYSEMA, UNSPECIFIED: ICD-10-CM

## 2025-03-11 DIAGNOSIS — J98.4 EMPHYSEMA, UNSPECIFIED: ICD-10-CM

## 2025-03-11 DIAGNOSIS — J45.909 UNSPECIFIED ASTHMA, UNCOMPLICATED: ICD-10-CM

## 2025-03-11 DIAGNOSIS — I63.9 CEREBRAL INFARCTION, UNSPECIFIED: ICD-10-CM

## 2025-03-11 PROCEDURE — 99215 OFFICE O/P EST HI 40 MIN: CPT

## 2025-03-11 PROCEDURE — 99455 WORK RELATED DISABILITY EXAM: CPT

## 2025-03-11 PROCEDURE — 72170 X-RAY EXAM OF PELVIS: CPT

## 2025-03-11 PROCEDURE — G2211 COMPLEX E/M VISIT ADD ON: CPT | Mod: NC

## 2025-03-12 ENCOUNTER — APPOINTMENT (OUTPATIENT)
Dept: ORTHOPEDIC SURGERY | Facility: CLINIC | Age: 65
End: 2025-03-12

## 2025-03-13 ENCOUNTER — APPOINTMENT (OUTPATIENT)
Dept: MAMMOGRAPHY | Facility: CLINIC | Age: 65
End: 2025-03-13
Payer: MEDICAID

## 2025-03-13 ENCOUNTER — RESULT REVIEW (OUTPATIENT)
Age: 65
End: 2025-03-13

## 2025-03-13 PROCEDURE — 77063 BREAST TOMOSYNTHESIS BI: CPT

## 2025-03-13 PROCEDURE — 77067 SCR MAMMO BI INCL CAD: CPT

## 2025-03-15 ENCOUNTER — OUTPATIENT (OUTPATIENT)
Dept: OUTPATIENT SERVICES | Facility: HOSPITAL | Age: 65
LOS: 1 days | End: 2025-03-15
Payer: MEDICAID

## 2025-03-15 ENCOUNTER — APPOINTMENT (OUTPATIENT)
Dept: ULTRASOUND IMAGING | Facility: CLINIC | Age: 65
End: 2025-03-15

## 2025-03-15 DIAGNOSIS — E04.1 NONTOXIC SINGLE THYROID NODULE: ICD-10-CM

## 2025-03-15 PROCEDURE — 76536 US EXAM OF HEAD AND NECK: CPT | Mod: 26

## 2025-03-15 PROCEDURE — 76536 US EXAM OF HEAD AND NECK: CPT

## 2025-03-19 ENCOUNTER — APPOINTMENT (OUTPATIENT)
Dept: CARDIOLOGY | Facility: CLINIC | Age: 65
End: 2025-03-19

## 2025-03-20 NOTE — DISCHARGE NOTE PROVIDER - REASON FOR ADMISSION
----- Message from Janneth Ruiz sent at 4/22/2019  9:53 AM CDT -----  Contact: pt  Pt would like to be called back regarding getting a new patient appt    Pt can be reached at [phone]   Talk to pt I scheduled her appt with dr segura [facility] in June. Pt agreed will send a reminder.    new onset afib no s/s of muscle wasting noted/well nourished

## 2025-03-28 ENCOUNTER — RX RENEWAL (OUTPATIENT)
Age: 65
End: 2025-03-28

## 2025-04-01 ENCOUNTER — APPOINTMENT (OUTPATIENT)
Dept: INTERNAL MEDICINE | Facility: CLINIC | Age: 65
End: 2025-04-01

## 2025-04-02 ENCOUNTER — APPOINTMENT (OUTPATIENT)
Dept: INTERNAL MEDICINE | Facility: CLINIC | Age: 65
End: 2025-04-02

## 2025-04-02 VITALS
DIASTOLIC BLOOD PRESSURE: 68 MMHG | OXYGEN SATURATION: 98 % | BODY MASS INDEX: 24.66 KG/M2 | SYSTOLIC BLOOD PRESSURE: 106 MMHG | HEIGHT: 65 IN | HEART RATE: 78 BPM | WEIGHT: 148 LBS

## 2025-04-02 DIAGNOSIS — J44.9 CHRONIC OBSTRUCTIVE PULMONARY DISEASE, UNSPECIFIED: ICD-10-CM

## 2025-04-02 DIAGNOSIS — R13.12 DYSPHAGIA, OROPHARYNGEAL PHASE: ICD-10-CM

## 2025-04-02 DIAGNOSIS — I63.9 CEREBRAL INFARCTION, UNSPECIFIED: ICD-10-CM

## 2025-04-02 DIAGNOSIS — E04.1 NONTOXIC SINGLE THYROID NODULE: ICD-10-CM

## 2025-04-02 DIAGNOSIS — J82.83 EOSINOPHILIC ASTHMA: ICD-10-CM

## 2025-04-02 DIAGNOSIS — J98.4 CHRONIC OBSTRUCTIVE PULMONARY DISEASE, UNSPECIFIED: ICD-10-CM

## 2025-04-02 PROCEDURE — 99215 OFFICE O/P EST HI 40 MIN: CPT

## 2025-04-02 PROCEDURE — G2211 COMPLEX E/M VISIT ADD ON: CPT | Mod: NC

## 2025-04-04 ENCOUNTER — RX RENEWAL (OUTPATIENT)
Age: 65
End: 2025-04-04

## 2025-04-04 PROBLEM — J44.9 MIXED RESTRICTIVE AND OBSTRUCTIVE LUNG DISEASE: Status: ACTIVE | Noted: 2022-09-19

## 2025-04-09 ENCOUNTER — NON-APPOINTMENT (OUTPATIENT)
Age: 65
End: 2025-04-09

## 2025-04-09 ENCOUNTER — APPOINTMENT (OUTPATIENT)
Dept: CARDIOLOGY | Facility: CLINIC | Age: 65
End: 2025-04-09
Payer: COMMERCIAL

## 2025-04-09 VITALS
SYSTOLIC BLOOD PRESSURE: 107 MMHG | DIASTOLIC BLOOD PRESSURE: 70 MMHG | BODY MASS INDEX: 24.66 KG/M2 | HEART RATE: 101 BPM | WEIGHT: 148 LBS | HEIGHT: 65 IN

## 2025-04-09 DIAGNOSIS — I27.20 PULMONARY HYPERTENSION, UNSPECIFIED: ICD-10-CM

## 2025-04-09 DIAGNOSIS — I34.0 NONRHEUMATIC MITRAL (VALVE) INSUFFICIENCY: ICD-10-CM

## 2025-04-09 DIAGNOSIS — I48.91 UNSPECIFIED ATRIAL FIBRILLATION: ICD-10-CM

## 2025-04-09 DIAGNOSIS — L30.9 DERMATITIS, UNSPECIFIED: ICD-10-CM

## 2025-04-09 DIAGNOSIS — I07.1 RHEUMATIC TRICUSPID INSUFFICIENCY: ICD-10-CM

## 2025-04-09 DIAGNOSIS — I10 ESSENTIAL (PRIMARY) HYPERTENSION: ICD-10-CM

## 2025-04-09 PROCEDURE — 93000 ELECTROCARDIOGRAM COMPLETE: CPT

## 2025-04-09 PROCEDURE — 99214 OFFICE O/P EST MOD 30 MIN: CPT

## 2025-04-09 PROCEDURE — G2211 COMPLEX E/M VISIT ADD ON: CPT | Mod: NC

## 2025-04-22 ENCOUNTER — APPOINTMENT (OUTPATIENT)
Dept: HUMAN REPRODUCTION | Facility: CLINIC | Age: 65
End: 2025-04-22

## 2025-04-28 NOTE — PHYSICAL THERAPY INITIAL EVALUATION ADULT - PASSIVE RANGE OF MOTION EXAMINATION, REHAB EVAL
bilateral upper extremity Passive ROM was WFL (within functional limits)/bilateral lower extremity Passive ROM was WFL (within functional limits) 99

## 2025-04-29 ENCOUNTER — APPOINTMENT (OUTPATIENT)
Dept: GASTROENTEROLOGY | Facility: CLINIC | Age: 65
End: 2025-04-29

## 2025-05-06 ENCOUNTER — NON-APPOINTMENT (OUTPATIENT)
Age: 65
End: 2025-05-06

## 2025-05-07 ENCOUNTER — APPOINTMENT (OUTPATIENT)
Dept: GASTROENTEROLOGY | Facility: CLINIC | Age: 65
End: 2025-05-07
Payer: COMMERCIAL

## 2025-05-07 DIAGNOSIS — K22.4 DYSKINESIA OF ESOPHAGUS: ICD-10-CM

## 2025-05-07 DIAGNOSIS — K31.89 OTHER SPECIFIED DISEASE OF ESOPHAGUS: ICD-10-CM

## 2025-05-07 DIAGNOSIS — K22.89 OTHER SPECIFIED DISEASE OF ESOPHAGUS: ICD-10-CM

## 2025-05-07 PROBLEM — R13.10 DYSPHAGIA, UNSPECIFIED TYPE: Status: ACTIVE | Noted: 2025-05-07

## 2025-05-07 PROCEDURE — 99214 OFFICE O/P EST MOD 30 MIN: CPT | Mod: 93

## 2025-05-08 ENCOUNTER — APPOINTMENT (OUTPATIENT)
Dept: ORTHOPEDIC SURGERY | Facility: CLINIC | Age: 65
End: 2025-05-08
Payer: OTHER MISCELLANEOUS

## 2025-05-08 VITALS — WEIGHT: 148 LBS | BODY MASS INDEX: 24.66 KG/M2 | HEIGHT: 65 IN

## 2025-05-08 DIAGNOSIS — M16.12 UNILATERAL PRIMARY OSTEOARTHRITIS, LEFT HIP: ICD-10-CM

## 2025-05-08 DIAGNOSIS — M70.61 TROCHANTERIC BURSITIS, RIGHT HIP: ICD-10-CM

## 2025-05-08 DIAGNOSIS — M17.12 UNILATERAL PRIMARY OSTEOARTHRITIS, LEFT KNEE: ICD-10-CM

## 2025-05-08 DIAGNOSIS — M70.62 TROCHANTERIC BURSITIS, RIGHT HIP: ICD-10-CM

## 2025-05-08 PROCEDURE — 99213 OFFICE O/P EST LOW 20 MIN: CPT | Mod: 25

## 2025-05-08 PROCEDURE — 20610 DRAIN/INJ JOINT/BURSA W/O US: CPT | Mod: LT

## 2025-05-09 PROBLEM — M70.61 GREATER TROCHANTERIC BURSITIS OF BOTH HIPS: Status: ACTIVE | Noted: 2025-05-09

## 2025-05-12 NOTE — ED ADULT NURSE NOTE - SUICIDE SCREENING QUESTION 2
Was treated for a pneumonia last month. I spoke to her on Friday. She was still having congestion and sob. I treated her with a course of prednisone. She is feeling better this morning and has 3 more days left. Continue breztri and prednisone and nebulizer.     No

## 2025-05-16 ENCOUNTER — APPOINTMENT (OUTPATIENT)
Dept: OTOLARYNGOLOGY | Facility: CLINIC | Age: 65
End: 2025-05-16
Payer: COMMERCIAL

## 2025-05-16 VITALS
SYSTOLIC BLOOD PRESSURE: 105 MMHG | HEIGHT: 65 IN | HEART RATE: 65 BPM | TEMPERATURE: 97.2 F | BODY MASS INDEX: 24.66 KG/M2 | WEIGHT: 148 LBS | DIASTOLIC BLOOD PRESSURE: 74 MMHG

## 2025-05-16 DIAGNOSIS — R13.10 DYSPHAGIA, UNSPECIFIED: ICD-10-CM

## 2025-05-16 DIAGNOSIS — R13.12 DYSPHAGIA, OROPHARYNGEAL PHASE: ICD-10-CM

## 2025-05-16 PROCEDURE — 99214 OFFICE O/P EST MOD 30 MIN: CPT | Mod: 25

## 2025-05-16 PROCEDURE — 31575 DIAGNOSTIC LARYNGOSCOPY: CPT

## 2025-05-22 ENCOUNTER — RX CHANGE (OUTPATIENT)
Age: 65
End: 2025-05-22

## 2025-05-26 ENCOUNTER — RX RENEWAL (OUTPATIENT)
Age: 65
End: 2025-05-26

## 2025-05-27 RX ORDER — DAPAGLIFLOZIN 10 MG/1
10 TABLET, FILM COATED ORAL
Qty: 90 | Refills: 3 | Status: ACTIVE | COMMUNITY
Start: 2025-05-27 | End: 1900-01-01

## 2025-05-28 ENCOUNTER — RX CHANGE (OUTPATIENT)
Age: 65
End: 2025-05-28

## 2025-05-29 RX ORDER — EMPAGLIFLOZIN 10 MG/1
10 TABLET, FILM COATED ORAL DAILY
Qty: 30 | Refills: 3 | Status: ACTIVE | COMMUNITY
Start: 2025-05-22 | End: 1900-01-01

## 2025-05-29 RX ORDER — DAPAGLIFLOZIN 10 MG/1
10 TABLET, FILM COATED ORAL
Qty: 30 | Refills: 2 | Status: ACTIVE | COMMUNITY
Start: 2025-05-29 | End: 1900-01-01

## 2025-06-17 ENCOUNTER — APPOINTMENT (OUTPATIENT)
Dept: INTERNAL MEDICINE | Facility: CLINIC | Age: 65
End: 2025-06-17
Payer: COMMERCIAL

## 2025-06-17 VITALS
DIASTOLIC BLOOD PRESSURE: 58 MMHG | WEIGHT: 146 LBS | BODY MASS INDEX: 24.32 KG/M2 | OXYGEN SATURATION: 98 % | HEIGHT: 65 IN | HEART RATE: 74 BPM | SYSTOLIC BLOOD PRESSURE: 95 MMHG

## 2025-06-17 PROCEDURE — 99214 OFFICE O/P EST MOD 30 MIN: CPT

## 2025-06-17 PROCEDURE — G2211 COMPLEX E/M VISIT ADD ON: CPT | Mod: NC

## 2025-06-26 ENCOUNTER — APPOINTMENT (OUTPATIENT)
Dept: ORTHOPEDIC SURGERY | Facility: CLINIC | Age: 65
End: 2025-06-26
Payer: OTHER MISCELLANEOUS

## 2025-06-26 VITALS — WEIGHT: 146 LBS | BODY MASS INDEX: 23.46 KG/M2 | HEIGHT: 66 IN

## 2025-06-26 PROBLEM — M25.552 GREATER TROCHANTERIC PAIN SYNDROME OF LEFT LOWER EXTREMITY: Status: ACTIVE | Noted: 2025-06-26

## 2025-06-26 PROCEDURE — 99213 OFFICE O/P EST LOW 20 MIN: CPT

## 2025-06-26 RX ORDER — EMPAGLIFLOZIN 10 MG/1
10 TABLET, FILM COATED ORAL DAILY
Qty: 30 | Refills: 3 | Status: ACTIVE | COMMUNITY
Start: 2025-06-26 | End: 1900-01-01

## 2025-06-30 ENCOUNTER — APPOINTMENT (OUTPATIENT)
Dept: INTERNAL MEDICINE | Facility: CLINIC | Age: 65
End: 2025-06-30
Payer: COMMERCIAL

## 2025-06-30 PROCEDURE — 36415 COLL VENOUS BLD VENIPUNCTURE: CPT

## 2025-07-01 ENCOUNTER — RX RENEWAL (OUTPATIENT)
Age: 65
End: 2025-07-01

## 2025-07-02 ENCOUNTER — APPOINTMENT (OUTPATIENT)
Dept: CARDIOLOGY | Facility: CLINIC | Age: 65
End: 2025-07-02
Payer: COMMERCIAL

## 2025-07-02 ENCOUNTER — NON-APPOINTMENT (OUTPATIENT)
Age: 65
End: 2025-07-02

## 2025-07-02 VITALS
WEIGHT: 146 LBS | BODY MASS INDEX: 23.46 KG/M2 | SYSTOLIC BLOOD PRESSURE: 115 MMHG | DIASTOLIC BLOOD PRESSURE: 70 MMHG | HEIGHT: 66 IN | HEART RATE: 67 BPM | OXYGEN SATURATION: 99 %

## 2025-07-02 PROCEDURE — 99214 OFFICE O/P EST MOD 30 MIN: CPT

## 2025-07-02 PROCEDURE — 93000 ELECTROCARDIOGRAM COMPLETE: CPT

## 2025-07-02 PROCEDURE — G2211 COMPLEX E/M VISIT ADD ON: CPT | Mod: NC

## 2025-07-08 ENCOUNTER — APPOINTMENT (OUTPATIENT)
Dept: RADIOLOGY | Facility: HOSPITAL | Age: 65
End: 2025-07-08

## 2025-07-08 ENCOUNTER — OUTPATIENT (OUTPATIENT)
Dept: OUTPATIENT SERVICES | Facility: HOSPITAL | Age: 65
LOS: 1 days | End: 2025-07-08

## 2025-07-08 ENCOUNTER — APPOINTMENT (OUTPATIENT)
Dept: SPEECH THERAPY | Facility: HOSPITAL | Age: 65
End: 2025-07-08

## 2025-07-08 DIAGNOSIS — R13.12 DYSPHAGIA, OROPHARYNGEAL PHASE: ICD-10-CM

## 2025-07-08 PROCEDURE — 74230 X-RAY XM SWLNG FUNCJ C+: CPT | Mod: 26

## 2025-07-09 ENCOUNTER — APPOINTMENT (OUTPATIENT)
Dept: INTERNAL MEDICINE | Facility: CLINIC | Age: 65
End: 2025-07-09
Payer: MEDICARE

## 2025-07-09 ENCOUNTER — NON-APPOINTMENT (OUTPATIENT)
Age: 65
End: 2025-07-09

## 2025-07-09 VITALS
DIASTOLIC BLOOD PRESSURE: 69 MMHG | HEIGHT: 66 IN | SYSTOLIC BLOOD PRESSURE: 102 MMHG | WEIGHT: 145 LBS | BODY MASS INDEX: 23.3 KG/M2 | HEART RATE: 89 BPM | OXYGEN SATURATION: 98 %

## 2025-07-09 PROCEDURE — G2211 COMPLEX E/M VISIT ADD ON: CPT

## 2025-07-09 PROCEDURE — 99205 OFFICE O/P NEW HI 60 MIN: CPT | Mod: 25

## 2025-07-09 PROCEDURE — G0402 INITIAL PREVENTIVE EXAM: CPT

## 2025-07-09 PROCEDURE — 93000 ELECTROCARDIOGRAM COMPLETE: CPT | Mod: 59

## 2025-07-09 RX ORDER — ROSUVASTATIN CALCIUM 10 MG/1
10 TABLET, FILM COATED ORAL
Qty: 90 | Refills: 1 | Status: ACTIVE | COMMUNITY
Start: 2025-07-09 | End: 1900-01-01

## 2025-07-17 ENCOUNTER — APPOINTMENT (OUTPATIENT)
Dept: PULMONOLOGY | Facility: CLINIC | Age: 65
End: 2025-07-17
Payer: MEDICARE

## 2025-07-17 VITALS
TEMPERATURE: 97.3 F | WEIGHT: 141 LBS | HEIGHT: 66 IN | SYSTOLIC BLOOD PRESSURE: 90 MMHG | BODY MASS INDEX: 22.66 KG/M2 | OXYGEN SATURATION: 94 % | HEART RATE: 57 BPM | DIASTOLIC BLOOD PRESSURE: 60 MMHG | RESPIRATION RATE: 16 BRPM

## 2025-07-17 PROCEDURE — 94727 GAS DIL/WSHOT DETER LNG VOL: CPT

## 2025-07-17 PROCEDURE — 99214 OFFICE O/P EST MOD 30 MIN: CPT | Mod: 25

## 2025-07-17 PROCEDURE — 94010 BREATHING CAPACITY TEST: CPT

## 2025-07-17 PROCEDURE — ZZZZZ: CPT

## 2025-07-17 PROCEDURE — 95012 NITRIC OXIDE EXP GAS DETER: CPT

## 2025-07-17 PROCEDURE — 94729 DIFFUSING CAPACITY: CPT

## 2025-08-05 ENCOUNTER — RX RENEWAL (OUTPATIENT)
Age: 65
End: 2025-08-05

## 2025-09-04 ENCOUNTER — TRANSCRIPTION ENCOUNTER (OUTPATIENT)
Age: 65
End: 2025-09-04

## 2025-09-06 LAB
ALBUMIN SERPL ELPH-MCNC: 4.1 G/DL
ALP BLD-CCNC: 76 U/L
ALT SERPL-CCNC: 12 U/L
ANION GAP SERPL CALC-SCNC: 13 MMOL/L
AST SERPL-CCNC: 23 U/L
BASOPHILS # BLD AUTO: 0.02 K/UL
BASOPHILS NFR BLD AUTO: 0.3 %
BILIRUB SERPL-MCNC: 0.2 MG/DL
BUN SERPL-MCNC: 10 MG/DL
CALCIUM SERPL-MCNC: 9.4 MG/DL
CHLORIDE SERPL-SCNC: 106 MMOL/L
CHOLEST SERPL-MCNC: 154 MG/DL
CO2 SERPL-SCNC: 22 MMOL/L
CREAT SERPL-MCNC: 0.76 MG/DL
EGFRCR SERPLBLD CKD-EPI 2021: 87 ML/MIN/1.73M2
EOSINOPHIL # BLD AUTO: 0.44 K/UL
EOSINOPHIL NFR BLD AUTO: 7.5 %
ESTIMATED AVERAGE GLUCOSE: 117 MG/DL
GLUCOSE SERPL-MCNC: 90 MG/DL
HBA1C MFR BLD HPLC: 5.7 %
HCT VFR BLD CALC: 36.7 %
HDLC SERPL-MCNC: 55 MG/DL
HGB BLD-MCNC: 12 G/DL
IMM GRANULOCYTES NFR BLD AUTO: 0.2 %
LDLC SERPL-MCNC: 88 MG/DL
LYMPHOCYTES # BLD AUTO: 1.78 K/UL
LYMPHOCYTES NFR BLD AUTO: 30.5 %
MAN DIFF?: NORMAL
MCHC RBC-ENTMCNC: 29 PG
MCHC RBC-ENTMCNC: 32.7 G/DL
MCV RBC AUTO: 88.6 FL
MONOCYTES # BLD AUTO: 0.53 K/UL
MONOCYTES NFR BLD AUTO: 9.1 %
NEUTROPHILS # BLD AUTO: 3.05 K/UL
NEUTROPHILS NFR BLD AUTO: 52.4 %
NONHDLC SERPL-MCNC: 99 MG/DL
PLATELET # BLD AUTO: 233 K/UL
POTASSIUM SERPL-SCNC: 4.1 MMOL/L
PROT SERPL-MCNC: 6.8 G/DL
RBC # BLD: 4.14 M/UL
RBC # FLD: 13.8 %
SODIUM SERPL-SCNC: 142 MMOL/L
TRIGL SERPL-MCNC: 52 MG/DL
TSH SERPL-ACNC: 0.86 UIU/ML
WBC # FLD AUTO: 5.83 K/UL

## 2025-09-09 ENCOUNTER — APPOINTMENT (OUTPATIENT)
Dept: INTERNAL MEDICINE | Facility: CLINIC | Age: 65
End: 2025-09-09
Payer: MEDICARE

## 2025-09-09 VITALS
WEIGHT: 149 LBS | DIASTOLIC BLOOD PRESSURE: 68 MMHG | OXYGEN SATURATION: 98 % | BODY MASS INDEX: 23.95 KG/M2 | SYSTOLIC BLOOD PRESSURE: 105 MMHG | HEART RATE: 60 BPM | HEIGHT: 66 IN

## 2025-09-09 DIAGNOSIS — L81.9 DISORDER OF PIGMENTATION, UNSPECIFIED: ICD-10-CM

## 2025-09-09 DIAGNOSIS — I48.91 UNSPECIFIED ATRIAL FIBRILLATION: ICD-10-CM

## 2025-09-09 DIAGNOSIS — J98.4 CHRONIC OBSTRUCTIVE PULMONARY DISEASE, UNSPECIFIED: ICD-10-CM

## 2025-09-09 DIAGNOSIS — I63.9 CEREBRAL INFARCTION, UNSPECIFIED: ICD-10-CM

## 2025-09-09 DIAGNOSIS — J44.9 CHRONIC OBSTRUCTIVE PULMONARY DISEASE, UNSPECIFIED: ICD-10-CM

## 2025-09-09 DIAGNOSIS — I27.20 PULMONARY HYPERTENSION, UNSPECIFIED: ICD-10-CM

## 2025-09-09 DIAGNOSIS — I10 ESSENTIAL (PRIMARY) HYPERTENSION: ICD-10-CM

## 2025-09-09 PROCEDURE — G2211 COMPLEX E/M VISIT ADD ON: CPT

## 2025-09-09 PROCEDURE — 99215 OFFICE O/P EST HI 40 MIN: CPT

## 2025-09-09 RX ORDER — BRIMONIDINE TARTRATE 1.5 MG/ML
0.15 SOLUTION/ DROPS OPHTHALMIC TWICE DAILY
Qty: 1 | Refills: 3 | Status: ACTIVE | COMMUNITY
Start: 2025-09-09 | End: 1900-01-01

## 2025-09-12 ENCOUNTER — APPOINTMENT (OUTPATIENT)
Dept: OTOLARYNGOLOGY | Facility: CLINIC | Age: 65
End: 2025-09-12
Payer: MEDICARE

## 2025-09-12 VITALS — HEART RATE: 62 BPM | SYSTOLIC BLOOD PRESSURE: 124 MMHG | TEMPERATURE: 98 F | DIASTOLIC BLOOD PRESSURE: 81 MMHG

## 2025-09-12 DIAGNOSIS — R13.12 DYSPHAGIA, OROPHARYNGEAL PHASE: ICD-10-CM

## 2025-09-12 DIAGNOSIS — K22.4 DYSKINESIA OF ESOPHAGUS: ICD-10-CM

## 2025-09-12 PROCEDURE — 99204 OFFICE O/P NEW MOD 45 MIN: CPT | Mod: 25

## 2025-09-12 PROCEDURE — 31231 NASAL ENDOSCOPY DX: CPT

## 2025-09-12 RX ORDER — AZELASTINE HYDROCHLORIDE AND FLUTICASONE PROPIONATE 137; 50 UG/1; UG/1
137-50 SPRAY, METERED NASAL
Qty: 1 | Refills: 11 | Status: ACTIVE | COMMUNITY
Start: 2025-09-12 | End: 1900-01-01

## 2025-09-16 ENCOUNTER — APPOINTMENT (OUTPATIENT)
Dept: GASTROENTEROLOGY | Facility: CLINIC | Age: 65
End: 2025-09-16
Payer: MEDICARE

## 2025-09-16 ENCOUNTER — NON-APPOINTMENT (OUTPATIENT)
Age: 65
End: 2025-09-16

## 2025-09-16 VITALS
HEIGHT: 66 IN | BODY MASS INDEX: 23.95 KG/M2 | DIASTOLIC BLOOD PRESSURE: 80 MMHG | OXYGEN SATURATION: 98 % | HEART RATE: 69 BPM | SYSTOLIC BLOOD PRESSURE: 105 MMHG | WEIGHT: 149 LBS

## 2025-09-16 DIAGNOSIS — R13.10 DYSPHAGIA, UNSPECIFIED: ICD-10-CM

## 2025-09-16 DIAGNOSIS — R09.A2 FOREIGN BODY SENSATION, THROAT: ICD-10-CM

## 2025-09-16 DIAGNOSIS — Z12.11 ENCOUNTER FOR SCREENING FOR MALIGNANT NEOPLASM OF COLON: ICD-10-CM

## 2025-09-16 PROCEDURE — 99205 OFFICE O/P NEW HI 60 MIN: CPT

## (undated) DEVICE — FORCEP MULTIBITE MULTIPLE SAMPLE 2.4MM 2.8MM 240CM ORANGE DISP

## (undated) DEVICE — BITE BLOCK ADULT 20 X 27MM (GREEN)

## (undated) DEVICE — COLONOSCOPE 2416901: Type: DURABLE MEDICAL EQUIPMENT

## (undated) DEVICE — FOLEY HOLDER STATLOCK 2 WAY ADULT

## (undated) DEVICE — FORCEP RADIAL JAW 4 JUMBO 2.8MM 3.2MM 240CM ORANGE DISP

## (undated) DEVICE — SYR LUER LOK 5CC

## (undated) DEVICE — IRRIGATOR BIO SHIELD

## (undated) DEVICE — CLAMP BX HOT RAD JAW 3

## (undated) DEVICE — TUBING SUCTION 20FT

## (undated) DEVICE — BIOPSY FORCEP RADIAL JAW 4 STANDARD WITH NEEDLE

## (undated) DEVICE — TUBING SUCTION CONN 6FT STERILE

## (undated) DEVICE — CATH IV SAFE BC 22G X 1" (BLUE)

## (undated) DEVICE — SYR LUER LOK 50CC

## (undated) DEVICE — SYR LUER LOK 20CC

## (undated) DEVICE — SUCTION YANKAUER NO CONTROL VENT

## (undated) DEVICE — PACK IV START WITH CHG

## (undated) DEVICE — BRUSH COLONOSCOPY CYTOLOGY

## (undated) DEVICE — SENSOR O2 FINGER ADULT

## (undated) DEVICE — SYR ALLIANCE II INFLATION 60ML

## (undated) DEVICE — CATH IV SAFE BC 20G X 1.16" (PINK)

## (undated) DEVICE — TUBING IV SET GRAVITY 3Y 100" MACRO

## (undated) DEVICE — BALLOON US ENDO

## (undated) DEVICE — SOL INJ NS 0.9% 500ML 2 PORT